# Patient Record
Sex: FEMALE | Race: WHITE | HISPANIC OR LATINO | Employment: UNEMPLOYED | URBAN - NONMETROPOLITAN AREA
[De-identification: names, ages, dates, MRNs, and addresses within clinical notes are randomized per-mention and may not be internally consistent; named-entity substitution may affect disease eponyms.]

---

## 2017-01-16 RX ORDER — ATORVASTATIN CALCIUM 80 MG/1
TABLET, FILM COATED ORAL
Qty: 30 TABLET | Refills: 2 | Status: SHIPPED | OUTPATIENT
Start: 2017-01-16 | End: 2017-06-08 | Stop reason: ALTCHOICE

## 2017-01-16 RX ORDER — CLOPIDOGREL BISULFATE 75 MG/1
TABLET ORAL
Qty: 30 TABLET | Refills: 2 | Status: SHIPPED | OUTPATIENT
Start: 2017-01-16 | End: 2017-05-22 | Stop reason: SDUPTHER

## 2017-01-16 RX ORDER — LISINOPRIL 5 MG/1
TABLET ORAL
Qty: 30 TABLET | Refills: 2 | Status: SHIPPED | OUTPATIENT
Start: 2017-01-16 | End: 2017-06-08

## 2017-01-25 ENCOUNTER — DOCUMENTATION (OUTPATIENT)
Dept: CARDIOLOGY | Facility: CLINIC | Age: 57
End: 2017-01-25

## 2017-01-25 NOTE — PROGRESS NOTES
Our office had received a DME medical necessity clinical documentation from, so i had called patient yest. To find out if she was still wearing her life vest and to see why she had never had her MUGA test done that she had cancelled and no showed for several times. She informed me that she had taken the Lifevest off over a month to month and a half ago due to it being uncomfortable and was tored of wearing it. She stated that every time the muga was scheduled that it had been scheduled at [facility]. I informed the risk involved with her not wearing the leftvest and she was aware of this. Told her i would discuss all above with EDWINA Elias and for her to make sure she keeps her appt. With him in Feb. And she stated she would. EDWINA Elias aware of all above. PH,CLIFFORDN

## 2017-02-06 DIAGNOSIS — I10 ESSENTIAL HYPERTENSION: Primary | ICD-10-CM

## 2017-02-06 RX ORDER — CARVEDILOL 6.25 MG/1
TABLET ORAL
Qty: 60 TABLET | Refills: 5 | Status: SHIPPED | OUTPATIENT
Start: 2017-02-06 | End: 2017-06-08

## 2017-02-23 ENCOUNTER — OFFICE VISIT (OUTPATIENT)
Dept: CARDIOLOGY | Facility: CLINIC | Age: 57
End: 2017-02-23

## 2017-02-23 VITALS
HEART RATE: 89 BPM | HEIGHT: 61 IN | WEIGHT: 174 LBS | DIASTOLIC BLOOD PRESSURE: 71 MMHG | BODY MASS INDEX: 32.85 KG/M2 | OXYGEN SATURATION: 92 % | SYSTOLIC BLOOD PRESSURE: 117 MMHG

## 2017-02-23 DIAGNOSIS — R07.9 CHEST PAIN, UNSPECIFIED TYPE: Primary | ICD-10-CM

## 2017-02-23 DIAGNOSIS — I50.20 SYSTOLIC CONGESTIVE HEART FAILURE, UNSPECIFIED CONGESTIVE HEART FAILURE CHRONICITY: ICD-10-CM

## 2017-02-23 DIAGNOSIS — R06.02 SHORTNESS OF BREATH: ICD-10-CM

## 2017-02-23 DIAGNOSIS — I42.9 CARDIOMYOPATHY (HCC): ICD-10-CM

## 2017-02-23 DIAGNOSIS — I25.10 CORONARY ARTERY DISEASE INVOLVING NATIVE CORONARY ARTERY OF NATIVE HEART WITHOUT ANGINA PECTORIS: ICD-10-CM

## 2017-02-23 PROCEDURE — 99214 OFFICE O/P EST MOD 30 MIN: CPT | Performed by: PHYSICIAN ASSISTANT

## 2017-02-23 RX ORDER — METFORMIN HYDROCHLORIDE 500 MG/1
1000 TABLET, EXTENDED RELEASE ORAL 2 TIMES DAILY
COMMUNITY
Start: 2017-02-20 | End: 2017-02-23 | Stop reason: DRUGHIGH

## 2017-02-23 RX ORDER — GABAPENTIN 800 MG/1
800 TABLET ORAL 4 TIMES DAILY
COMMUNITY
Start: 2017-02-20

## 2017-02-23 RX ORDER — BUDESONIDE AND FORMOTEROL FUMARATE DIHYDRATE 160; 4.5 UG/1; UG/1
AEROSOL RESPIRATORY (INHALATION)
COMMUNITY
Start: 2016-12-29 | End: 2017-08-02

## 2017-02-23 RX ORDER — ALBUTEROL SULFATE 2.5 MG/3ML
2.5 SOLUTION RESPIRATORY (INHALATION) EVERY 4 HOURS PRN
COMMUNITY
End: 2022-02-07

## 2017-02-23 NOTE — PROGRESS NOTES
Problem list     Subjective   Maeve Yates is a 56 y.o. female     Chief Complaint   Patient presents with   • Follow-up     3 mo.       HPI       PROBLEM LIST:  1. Coronary artery disease. Catheterization in 2009 at [facility] demonstrated 30%  circumflex disease with 50% posterolateral branch.   1.1 follow-up catheterization in September 2016 because of non-ST elevation myocardial infarction demonstrated a total occlusion of the right coronary artery with 90% high-grade LAD disease and 50-70% circumflex disease. Stenting of the LAD was performed. Medical management recommended and percutaneous intervention in the circumflex if patient fails medical therapy.  1.2 patient has been on maximum guide line directed therapy, Ranexa as she cannot tolerate isosorbide because of hypotension, with continued chest discomfort  2. Ischemic cardiomyopathy  2.1 anterior infarct and ejection fraction of 30-35%  2.2 patient wearing LifeVest  3. Hypertension.   4. Chronic tobacco habituation.   5. Dyslipidemia.     Patient is a 56-year-old female presents back to office for follow-up. She continues describes discomfort in her chest. She describes a heaviness and pressure sensation that occurs. Usually she will watch her pain but at times like to take nitroglycerin to abort her pain. She has moderate dyspnea at baseline and does complain of occasional PND orthopnea. She palpitate on occasion but no dizziness presyncope or syncope. Otherwise voices no complaints      Outpatient Encounter Prescriptions as of 2/23/2017   Medication Sig Dispense Refill   • albuterol (PROVENTIL HFA;VENTOLIN HFA) 108 (90 BASE) MCG/ACT inhaler Inhale 2 puffs every 4 (four) hours as needed for wheezing or shortness of air.     • albuterol (PROVENTIL) (2.5 MG/3ML) 0.083% nebulizer solution Take 2.5 mg by nebulization Every 4 (Four) Hours As Needed for wheezing.     • aspirin 81 MG EC tablet TAKE ONE TABLET BY MOUTH DAILY 30 tablet 11   • atorvastatin  (LIPITOR) 80 MG tablet TAKE 1 TABLET BY MOUTH DAILY. FOR CHOLESTEROL 30 tablet 2   • carvedilol (COREG) 6.25 MG tablet TAKE TWO TABLETS BY MOUTH 2 TIMES A DAY WITH MEALS FOR HEART AND BLOOD PRESSURE 60 tablet 5   • Cholecalciferol (VITAMIN D3) 35953 UNITS capsule Take  by mouth every 30 (thirty) days.     • clopidogrel (PLAVIX) 75 MG tablet TAKE 1 TABLET BY MOUTH DAILY. 30 tablet 2   • gabapentin (NEURONTIN) 800 MG tablet 3 (Three) Times a Day.     • Insulin Glargine (LANTUS SOLOSTAR) 100 UNIT/ML injection pen Inject 25 Units under the skin Every Night.     • isosorbide mononitrate (IMDUR) 30 MG 24 hr tablet Take 1 tablet by mouth daily. 30 tablet 6   • lisinopril (PRINIVIL,ZESTRIL) 5 MG tablet TAKE 1 TABLET BY MOUTH DAILY. FOR BLOOD PRESSURE 30 tablet 2   • metFORMIN (GLUCOPHAGE) 1000 MG tablet Take 1,000 mg by mouth 2 (two) times a day with meals.     • SYMBICORT 160-4.5 MCG/ACT inhaler prn     • [DISCONTINUED] gabapentin (NEURONTIN) 400 MG capsule Take 800 mg by mouth 3 (three) times a day.     • nitroglycerin (NITROSTAT) 0.4 MG SL tablet Place 0.4 mg under the tongue every 5 (five) minutes as needed for chest pain.     • [DISCONTINUED] amitriptyline (ELAVIL) 50 MG tablet Take  by mouth.     • [DISCONTINUED] glipiZIDE (GLUCOTROL) 10 MG tablet Take  by mouth 2 (two) times a day.     • [DISCONTINUED] metFORMIN ER (GLUCOPHAGE-XR) 500 MG 24 hr tablet 1,000 mg 2 (Two) Times a Day.     • [DISCONTINUED] metoprolol succinate XL (TOPROL-XL) 50 MG 24 hr tablet Take 50 mg by mouth daily.       No facility-administered encounter medications on file as of 2/23/2017.        Review of patient's allergies indicates no known allergies.    Past Medical History   Diagnosis Date   • CAD (coronary artery disease)      Catheterization in 2009 at [facility] demonstrated 30% circumflex disease with 50% posterolateral branch.    • Chest pain    • Diabetes mellitus    • Dyslipidemia    • Fatigue    • Hyperlipidemia    • Hypertension   "  • Shortness of breath    • Tobacco use        Social History     Social History   • Marital status:      Spouse name: N/A   • Number of children: N/A   • Years of education: N/A     Occupational History   • Not on file.     Social History Main Topics   • Smoking status: Former Smoker     Quit date: 9/15/2016   • Smokeless tobacco: Not on file   • Alcohol use No   • Drug use: No   • Sexual activity: Defer     Other Topics Concern   • Not on file     Social History Narrative       Family History   Problem Relation Age of Onset   • Hypertension Mother    • Hyperlipidemia Mother    • Skin cancer Mother        Review of Systems   Constitutional: Positive for fatigue.   HENT: Negative.    Eyes: Positive for visual disturbance (glasses).   Respiratory: Positive for shortness of breath.    Cardiovascular: Positive for chest pain (occas.). Negative for palpitations and leg swelling.   Gastrointestinal: Negative.    Endocrine: Negative.    Genitourinary: Negative.    Musculoskeletal: Negative.    Skin: Negative.    Allergic/Immunologic: Negative.    Neurological: Positive for dizziness and light-headedness.   Hematological: Bruises/bleeds easily (bruise).   Psychiatric/Behavioral: Positive for sleep disturbance.       Objective     Visit Vitals   • /71 (BP Location: Left arm, Patient Position: Sitting)   • Pulse 89   • Ht 61\" (154.9 cm)   • Wt 174 lb (78.9 kg)   • SpO2 92%   • BMI 32.88 kg/m2       Lab Results (most recent)     None          Physical Exam   Constitutional: She is oriented to person, place, and time. She appears well-developed and well-nourished. No distress.   HENT:   Head: Normocephalic and atraumatic.   Eyes: EOM are normal. Pupils are equal, round, and reactive to light.   Neck: No JVD present.   Cardiovascular: Normal rate, regular rhythm and normal heart sounds.  Exam reveals no gallop and no friction rub.    No murmur heard.  Pulmonary/Chest: Effort normal and breath sounds normal. No " respiratory distress. She has no wheezes. She has no rales. She exhibits no tenderness.   Abdominal: Soft. She exhibits no distension. There is no tenderness.   Musculoskeletal: Normal range of motion. She exhibits no edema.   Neurological: She is alert and oriented to person, place, and time. No cranial nerve deficit.   Skin: Skin is warm and dry. No rash noted. No erythema. No pallor.   Psychiatric: She has a normal mood and affect. Her behavior is normal.   Nursing note and vitals reviewed.      Procedure   Procedures       Assessment/Plan     Problems Addressed this Visit        Cardiovascular and Mediastinum    Coronary artery disease involving native coronary artery of native heart without angina pectoris    Relevant Orders    Cardiac catheterization    NUCLEAR MEDICINE CARDIAC BLOOD POOL MUGA REST STRESS    Cardiomyopathy    Relevant Orders    NUCLEAR MEDICINE CARDIAC BLOOD POOL MUGA REST STRESS    Systolic congestive heart failure    Relevant Orders    Cardiac catheterization    NUCLEAR MEDICINE CARDIAC BLOOD POOL MUGA REST STRESS       Respiratory    Shortness of breath    Relevant Orders    Cardiac catheterization    NUCLEAR MEDICINE CARDIAC BLOOD POOL MUGA REST STRESS       Nervous and Auditory    Chest pain - Primary    Relevant Orders    Cardiac catheterization    NUCLEAR MEDICINE CARDIAC BLOOD POOL MUGA REST STRESS              Recommendations  1. Patient has continued chest discomfort despite being on Ranexa. We have tried to manage her symptoms medically but she continues to have breakthrough pain. Last catheterization was recommended that her pain was not controlled to consider intervention in the circumflex. Therefore because of failed Max no gallop directed therapy, continued discomfort and moderate to severe disease in the circumflex, we will set up for catheterization for intervention of the circumflex artery.  2. Also like to obtain a MUGA scan to evaluate systolic function for consideration of  AICD. We will see her back for follow-up of above testing. Follow-up with primary as scheduled  3. We did discuss with her to hold her metformin the day before and they have catheterization

## 2017-03-02 ENCOUNTER — OUTSIDE FACILITY SERVICE (OUTPATIENT)
Dept: CARDIOLOGY | Facility: CLINIC | Age: 57
End: 2017-03-02

## 2017-03-02 PROCEDURE — 93458 L HRT ARTERY/VENTRICLE ANGIO: CPT | Performed by: INTERNAL MEDICINE

## 2017-03-02 PROCEDURE — 92928 PRQ TCAT PLMT NTRAC ST 1 LES: CPT | Performed by: INTERNAL MEDICINE

## 2017-05-22 DIAGNOSIS — I25.84 CORONARY ARTERY DISEASE DUE TO CALCIFIED CORONARY LESION: Primary | ICD-10-CM

## 2017-05-22 DIAGNOSIS — I25.10 CORONARY ARTERY DISEASE DUE TO CALCIFIED CORONARY LESION: Primary | ICD-10-CM

## 2017-05-22 RX ORDER — CLOPIDOGREL BISULFATE 75 MG/1
TABLET ORAL
Qty: 30 TABLET | Refills: 5 | Status: SHIPPED | OUTPATIENT
Start: 2017-05-22 | End: 2018-01-16 | Stop reason: SDUPTHER

## 2017-06-08 ENCOUNTER — OFFICE VISIT (OUTPATIENT)
Dept: CARDIOLOGY | Facility: CLINIC | Age: 57
End: 2017-06-08

## 2017-06-08 VITALS
OXYGEN SATURATION: 96 % | HEART RATE: 87 BPM | BODY MASS INDEX: 32.62 KG/M2 | DIASTOLIC BLOOD PRESSURE: 82 MMHG | WEIGHT: 172.8 LBS | HEIGHT: 61 IN | SYSTOLIC BLOOD PRESSURE: 137 MMHG

## 2017-06-08 DIAGNOSIS — I10 ESSENTIAL HYPERTENSION: ICD-10-CM

## 2017-06-08 DIAGNOSIS — R68.89 ABNORMAL ANKLE BRACHIAL INDEX: ICD-10-CM

## 2017-06-08 DIAGNOSIS — I42.9 CARDIOMYOPATHY (HCC): ICD-10-CM

## 2017-06-08 DIAGNOSIS — I25.10 CORONARY ARTERY DISEASE INVOLVING NATIVE CORONARY ARTERY OF NATIVE HEART WITHOUT ANGINA PECTORIS: ICD-10-CM

## 2017-06-08 DIAGNOSIS — I73.9 PAD (PERIPHERAL ARTERY DISEASE) (HCC): Primary | ICD-10-CM

## 2017-06-08 DIAGNOSIS — I73.9 INTERMITTENT CLAUDICATION (HCC): ICD-10-CM

## 2017-06-08 PROCEDURE — 99214 OFFICE O/P EST MOD 30 MIN: CPT | Performed by: PHYSICIAN ASSISTANT

## 2017-06-08 RX ORDER — FENOFIBRATE 145 MG/1
145 TABLET, COATED ORAL DAILY
Refills: 5 | COMMUNITY
Start: 2017-05-22

## 2017-06-08 RX ORDER — CARVEDILOL 6.25 MG/1
6.25 TABLET ORAL 2 TIMES DAILY WITH MEALS
Qty: 60 TABLET | Refills: 5 | Status: SHIPPED | OUTPATIENT
Start: 2017-06-08 | End: 2018-01-16 | Stop reason: SDUPTHER

## 2017-06-08 RX ORDER — RANOLAZINE 500 MG/1
500 TABLET, EXTENDED RELEASE ORAL 2 TIMES DAILY
Qty: 60 TABLET | Refills: 6 | Status: SHIPPED | OUTPATIENT
Start: 2017-06-08 | End: 2018-02-26 | Stop reason: SDUPTHER

## 2017-06-08 RX ORDER — METFORMIN HYDROCHLORIDE 500 MG/1
TABLET, EXTENDED RELEASE ORAL 2 TIMES DAILY
Refills: 1 | COMMUNITY
Start: 2017-04-24 | End: 2017-06-08

## 2017-06-08 RX ORDER — CILOSTAZOL 50 MG/1
50 TABLET ORAL 2 TIMES DAILY
Qty: 60 TABLET | Refills: 6 | Status: SHIPPED | OUTPATIENT
Start: 2017-06-08 | End: 2017-08-02 | Stop reason: CLARIF

## 2017-06-08 RX ORDER — CITALOPRAM 40 MG/1
40 TABLET ORAL DAILY
Refills: 1 | COMMUNITY
Start: 2017-05-22

## 2017-06-08 RX ORDER — RAMIPRIL 2.5 MG/1
2.5 CAPSULE ORAL DAILY
Qty: 30 CAPSULE | Refills: 6 | Status: ON HOLD | OUTPATIENT
Start: 2017-06-08 | End: 2017-08-03

## 2017-06-08 RX ORDER — INSULIN GLARGINE 100 [IU]/ML
38 INJECTION, SOLUTION SUBCUTANEOUS 2 TIMES DAILY
Refills: 3 | COMMUNITY
Start: 2017-04-21 | End: 2020-01-03

## 2017-06-08 NOTE — PROGRESS NOTES
Problem list     Subjective   Maeve Yates is a 56 y.o. female     Chief Complaint   Patient presents with   • Follow-up     patient appears in office today for follow up with CAMRON results       HPI    PROBLEM LIST:  1. Coronary artery disease. Catheterization in 2009 at [facility] demonstrated 30%  circumflex disease with 50% posterolateral branch.   1.1 follow-up catheterization in September 2016 because of non-ST elevation myocardial infarction demonstrated a total occlusion of the right coronary artery with 90% high-grade LAD disease and 50-70% circumflex disease. Stenting of the LAD was performed. Medical management recommended and percutaneous intervention in the circumflex if patient fails medical therapy.  1.2 follow-up catheterization March 2017 with stenting of the circumflex. Patent stent to the LAD with chronic total occlusion of the right coronary artery. Medical management recommended  2. Ischemic cardiomyopathy  2.1 anterior infarct and ejection fraction of 30-35%  2.2 EF by ventriculography at catheterization was 40%  3. Hypertension.   4. Chronic tobacco habituation.   5. Dyslipidemia.     Patient is a 56-year-old female presents back for follow-up. She has done well since her catheterization. She will he has mild chest discomfort that she notes on occasion. Recently she was noted to have low blood pressures and her isosorbide was discontinued.    Her chest pain is not as severe as what she felt previously stenting. It is only mild at this time. She has mild dyspnea when exerting but nothing has been progressive and denies PND orthopnea. Does not palpitate or have dysrhythmic symptoms.    She does complain of lower extremity discomfort. She describes a history of peripheral arterial disease. Recently, she had an CAMRON which was abnormal and is being referred back to office for evaluation    Current Outpatient Prescriptions   Medication Sig Dispense Refill   • albuterol (PROVENTIL HFA;VENTOLIN HFA)  108 (90 BASE) MCG/ACT inhaler Inhale 2 puffs every 4 (four) hours as needed for wheezing or shortness of air.     • albuterol (PROVENTIL) (2.5 MG/3ML) 0.083% nebulizer solution Take 2.5 mg by nebulization Every 4 (Four) Hours As Needed for wheezing.     • aspirin 81 MG EC tablet TAKE ONE TABLET BY MOUTH DAILY 30 tablet 11   • carvedilol (COREG) 6.25 MG tablet Take 1 tablet by mouth 2 (Two) Times a Day With Meals. 60 tablet 5   • citalopram (CeleXA) 20 MG tablet 20 mg Daily.  1   • clopidogrel (PLAVIX) 75 MG tablet TAKE 1 TABLET BY MOUTH DAILY. 30 tablet 5   • fenofibrate (TRICOR) 145 MG tablet Daily.  5   • gabapentin (NEURONTIN) 800 MG tablet 3 (Three) Times a Day.     • LANTUS 100 UNIT/ML injection 2 (Two) Times a Day. 38 units BID  3   • metFORMIN (GLUCOPHAGE) 1000 MG tablet Take 1,000 mg by mouth 2 (two) times a day with meals.     • nitroglycerin (NITROSTAT) 0.4 MG SL tablet Place 0.4 mg under the tongue every 5 (five) minutes as needed for chest pain.     • SYMBICORT 160-4.5 MCG/ACT inhaler prn     • cilostazol (PLETAL) 50 MG tablet Take 1 tablet by mouth 2 (Two) Times a Day. 60 tablet 6   • ramipril (ALTACE) 2.5 MG capsule Take 1 capsule by mouth Daily. 30 capsule 6   • ranolazine (RANEXA) 500 MG 12 hr tablet Take 1 tablet by mouth 2 (Two) Times a Day. 60 tablet 6     No current facility-administered medications for this visit.        Review of patient's allergies indicates no known allergies.    Past Medical History:   Diagnosis Date   • CAD (coronary artery disease)     Catheterization in 2009 at [facility] demonstrated 30% circumflex disease with 50% posterolateral branch.    • Chest pain    • Diabetes mellitus    • Dyslipidemia    • Fatigue    • Hyperlipidemia    • Hypertension    • Shortness of breath    • Tobacco use        Social History     Social History   • Marital status:      Spouse name: N/A   • Number of children: N/A   • Years of education: N/A     Occupational History   • Not on  "file.     Social History Main Topics   • Smoking status: Current Some Day Smoker     Packs/day: 0.50     Types: Cigarettes     Last attempt to quit: 9/15/2016   • Smokeless tobacco: Never Used   • Alcohol use No   • Drug use: No   • Sexual activity: Defer     Other Topics Concern   • Not on file     Social History Narrative       Family History   Problem Relation Age of Onset   • Hypertension Mother    • Hyperlipidemia Mother    • Skin cancer Mother    • No Known Problems Father        Review of Systems   Constitutional: Negative for fatigue.   HENT: Negative.    Eyes: Positive for visual disturbance (wears reading glasses).   Respiratory: Positive for cough (occasional cough) and wheezing (worse of AM). Negative for chest tightness and shortness of breath.    Cardiovascular: Positive for leg swelling (feet swelling). Negative for chest pain and palpitations.   Gastrointestinal: Negative.  Negative for abdominal pain, nausea and vomiting.   Endocrine: Negative.  Negative for cold intolerance, heat intolerance, polyphagia and polyuria.   Genitourinary: Positive for urgency (occasional urgency). Negative for difficulty urinating and frequency.   Musculoskeletal: Negative.  Negative for arthralgias, back pain, myalgias, neck pain and neck stiffness.   Skin: Negative.  Negative for rash and wound.   Allergic/Immunologic: Negative.  Negative for environmental allergies and food allergies.   Neurological: Negative.  Negative for dizziness, weakness, light-headedness, numbness and headaches.   Hematological: Negative.  Does not bruise/bleed easily.   Psychiatric/Behavioral: Negative for agitation, confusion and sleep disturbance. The patient is not nervous/anxious.        Objective   /82 (BP Location: Left arm, Patient Position: Sitting)  Pulse 87  Ht 61\" (154.9 cm)  Wt 172 lb 12.8 oz (78.4 kg)  SpO2 96%  BMI 32.65 kg/m2  Lab Results (most recent)     None        Physical Exam   Constitutional: She is oriented " to person, place, and time. She appears well-developed and well-nourished. No distress.   HENT:   Head: Normocephalic and atraumatic.   Eyes: EOM are normal. Pupils are equal, round, and reactive to light.   Neck: No JVD present.   Cardiovascular: Normal rate, regular rhythm and normal heart sounds.  Exam reveals no gallop and no friction rub.    No murmur heard.  Pulmonary/Chest: Effort normal and breath sounds normal. No respiratory distress. She has no wheezes. She has no rales. She exhibits no tenderness.   Abdominal: Soft.   Musculoskeletal: Normal range of motion. She exhibits no edema.   Neurological: She is alert and oriented to person, place, and time. No cranial nerve deficit.   Skin: Skin is warm and dry. No rash noted. No erythema. No pallor.   Psychiatric: She has a normal mood and affect. Her behavior is normal.   Nursing note and vitals reviewed.        Procedure   Procedures       Assessment/Plan      Diagnosis Plan   1. PAD (peripheral artery disease)  CT Angio Abdominal Aorta Bilateral Iliofem Runoff With & Without Contrast    CT Angio Abdominal Aorta Bilateral Iliofem Runoff With & Without Contrast   2. Essential hypertension  carvedilol (COREG) 6.25 MG tablet    CT Angio Abdominal Aorta Bilateral Iliofem Runoff With & Without Contrast    CT Angio Abdominal Aorta Bilateral Iliofem Runoff With & Without Contrast   3. Intermittent claudication  CT Angio Abdominal Aorta Bilateral Iliofem Runoff With & Without Contrast    CT Angio Abdominal Aorta Bilateral Iliofem Runoff With & Without Contrast   4. Abnormal ankle brachial index  CT Angio Abdominal Aorta Bilateral Iliofem Runoff With & Without Contrast    CT Angio Abdominal Aorta Bilateral Iliofem Runoff With & Without Contrast   5. Cardiomyopathy     6. Coronary artery disease involving native coronary artery of native heart without angina pectoris           Recommendations  1. Because of abnormal CAMRON, symptoms concerning for intermittent  claudication and history of peripheral arterial disease, we will like to schedule for CT angiogram abdominal aorta runoff.  2. Because of cardiomyopathy, I would like to add a small dose of an ACE inhibitor. I would like to increase Coreg but with recent hypotension, would like to evaluate response to addition of ramipril.  2. I would like to start her on Ranexa for antianginal therapy. We will see her back for follow-up of above testing. Follow-up primary as scheduled.

## 2017-06-19 DIAGNOSIS — E78.5 DYSLIPIDEMIA: Primary | ICD-10-CM

## 2017-06-19 RX ORDER — ATORVASTATIN CALCIUM 80 MG/1
TABLET, FILM COATED ORAL
Qty: 30 TABLET | Refills: 5 | Status: SHIPPED | OUTPATIENT
Start: 2017-06-19 | End: 2020-01-03

## 2017-07-10 ENCOUNTER — TELEPHONE (OUTPATIENT)
Dept: CARDIOLOGY | Facility: CLINIC | Age: 57
End: 2017-07-10

## 2017-07-10 DIAGNOSIS — I70.203 BILATERAL FEMORAL ARTERY STENOSIS (HCC): Primary | ICD-10-CM

## 2017-07-10 NOTE — TELEPHONE ENCOUNTER
----- Message from Alicia Dykes sent at 7/10/2017  3:07 PM EDT -----  Contact: RONNIE (DAUGHTER)  THE PATIENTS DAUGHTER CALLED AND REQUESTED RESULTS OF HER MOTHER'S CT FROM [facility].  SHE CAN BE REACHED [phone].  THANKS     CTA results reviewed per Miguel Lee PA-C. Patient to see Cardiothoracic due to Bilateral Femoral Artery Stenosis. Patient aware. Referral entered and DERIC Bull will schedule patient an appointment.

## 2017-07-17 ENCOUNTER — OFFICE VISIT (OUTPATIENT)
Dept: CARDIAC SURGERY | Facility: CLINIC | Age: 57
End: 2017-07-17

## 2017-07-17 VITALS
WEIGHT: 170 LBS | OXYGEN SATURATION: 93 % | HEART RATE: 86 BPM | TEMPERATURE: 97.8 F | HEIGHT: 62 IN | SYSTOLIC BLOOD PRESSURE: 140 MMHG | DIASTOLIC BLOOD PRESSURE: 84 MMHG | BODY MASS INDEX: 31.28 KG/M2

## 2017-07-17 DIAGNOSIS — I73.9 PAD (PERIPHERAL ARTERY DISEASE) (HCC): Primary | ICD-10-CM

## 2017-07-17 PROCEDURE — 99205 OFFICE O/P NEW HI 60 MIN: CPT | Performed by: THORACIC SURGERY (CARDIOTHORACIC VASCULAR SURGERY)

## 2017-07-17 NOTE — PROGRESS NOTES
07/17/2017  Patient Information  Maeve Page                                                                                          [address] [address]   1960  'PCP/Referring Physician'  Holden Linton MD  [phone]  No ref. provider found    Chief Complaint   Patient presents with   • Consult     Patient complains of constant leg pain left worse than right. Also says her legs get tired easily.   • Peripheral Vascular Disease       History of Present Illness:  This patient was referred to me with peripheral arterial disease.  She complains of both right and left calf claudication but states that the left calf is much worse than the right.  If she walks fast as little as  feet she begins to cramp in the left calf.  If she stops and rests for 2 or 3 minutes the cramping goes away.  If she walks more slowly she can walk further and the calf muscle just becomes very weak.  She has now had a CT angiogram demonstrating bilateral superficial femoral artery disease that appears to be worse in the left leg than the right.  She does have a long history of smoking and is continuing to smoke.  She has seen another vascular surgeon who recommended surgical intervention but apparently there was some disagreement and the patient has been asked to be referred to me.  She is accompanied by a family member at this time.      Patient Active Problem List   Diagnosis   • Chest pain   • Fatigue   • Shortness of breath   • Hypertension   • CAD (coronary artery disease)   • Tobacco use   • Dyslipidemia   • Diabetes mellitus   • Coronary artery disease involving native coronary artery of native heart without angina pectoris   • Cardiomyopathy   • Systolic congestive heart failure   • PAD (peripheral artery disease)   • Intermittent claudication   • Abnormal ankle brachial index     Past Medical History:   Diagnosis Date   • CAD (coronary artery disease)     Catheterization in 2009 at [facility]  demonstrated 30% circumflex disease with 50% posterolateral branch.    • Chest pain    • COPD (chronic obstructive pulmonary disease)    • Depression    • Diabetes mellitus    • Dyslipidemia    • Fatigue    • Hiatal hernia    • Hyperlipidemia    • Hypertension    • Myocardial infarction    • Peripheral vascular disease    • Shortness of breath    • Tobacco use      Past Surgical History:   Procedure Laterality Date   • CARDIAC CATHETERIZATION     • CORONARY ANGIOPLASTY     • CORONARY STENT PLACEMENT  09/15/2016    x1 to Prot Mid LAD  2.25x20   • TUBAL ABDOMINAL LIGATION         Current Outpatient Prescriptions:   •  albuterol (PROVENTIL HFA;VENTOLIN HFA) 108 (90 BASE) MCG/ACT inhaler, Inhale 2 puffs every 4 (four) hours as needed for wheezing or shortness of air., Disp: , Rfl:   •  aspirin 81 MG EC tablet, TAKE ONE TABLET BY MOUTH DAILY, Disp: 30 tablet, Rfl: 11  •  carvedilol (COREG) 6.25 MG tablet, Take 1 tablet by mouth 2 (Two) Times a Day With Meals., Disp: 60 tablet, Rfl: 5  •  cilostazol (PLETAL) 50 MG tablet, Take 1 tablet by mouth 2 (Two) Times a Day., Disp: 60 tablet, Rfl: 6  •  citalopram (CeleXA) 20 MG tablet, 20 mg Daily., Disp: , Rfl: 1  •  fenofibrate (TRICOR) 145 MG tablet, Daily., Disp: , Rfl: 5  •  gabapentin (NEURONTIN) 800 MG tablet, 3 (Three) Times a Day., Disp: , Rfl:   •  LANTUS 100 UNIT/ML injection, 2 (Two) Times a Day. 38 units BID, Disp: , Rfl: 3  •  metFORMIN (GLUCOPHAGE) 1000 MG tablet, Take 1,000 mg by mouth 2 (two) times a day with meals., Disp: , Rfl:   •  ramipril (ALTACE) 2.5 MG capsule, Take 1 capsule by mouth Daily., Disp: 30 capsule, Rfl: 6  •  ranolazine (RANEXA) 500 MG 12 hr tablet, Take 1 tablet by mouth 2 (Two) Times a Day., Disp: 60 tablet, Rfl: 6  •  albuterol (PROVENTIL) (2.5 MG/3ML) 0.083% nebulizer solution, Take 2.5 mg by nebulization Every 4 (Four) Hours As Needed for wheezing., Disp: , Rfl:   •  atorvastatin (LIPITOR) 80 MG tablet, TAKE 1 TABLET BY MOUTH DAILY. FOR  CHOLESTEROL, Disp: 30 tablet, Rfl: 5  •  clopidogrel (PLAVIX) 75 MG tablet, TAKE 1 TABLET BY MOUTH DAILY., Disp: 30 tablet, Rfl: 5  •  nitroglycerin (NITROSTAT) 0.4 MG SL tablet, Place 0.4 mg under the tongue every 5 (five) minutes as needed for chest pain., Disp: , Rfl:   •  SYMBICORT 160-4.5 MCG/ACT inhaler, prn, Disp: , Rfl:   No Known Allergies  Social History     Social History   • Marital status:      Spouse name: N/A   • Number of children: N/A   • Years of education: N/A     Occupational History   • Not on file.     Social History Main Topics   • Smoking status: Current Some Day Smoker     Packs/day: 0.50     Years: 40.00     Types: Cigarettes   • Smokeless tobacco: Never Used      Comment: Has smoked up to 1 1/2 ppd   • Alcohol use No   • Drug use: No   • Sexual activity: Defer     Other Topics Concern   • Not on file     Social History Narrative     Family History   Problem Relation Age of Onset   • Hypertension Mother    • Hyperlipidemia Mother    • Skin cancer Mother    • No Known Problems Father      Review of Systems   Constitution: Positive for diaphoresis, malaise/fatigue and night sweats. Negative for chills, fever and weight loss.   HENT: Negative.  Negative for headaches, hearing loss, odynophagia and sore throat.    Eyes: Negative.    Cardiovascular: Positive for chest pain, claudication and leg swelling (feet). Negative for dyspnea on exertion, orthopnea and palpitations.   Respiratory: Positive for shortness of breath and wheezing. Negative for cough and hemoptysis.    Endocrine: Negative.  Negative for cold intolerance, heat intolerance, polydipsia, polyphagia and polyuria.   Hematologic/Lymphatic: Bruises/bleeds easily.   Skin: Negative.  Negative for itching and rash.   Musculoskeletal: Positive for back pain and muscle cramps. Negative for joint pain, joint swelling and myalgias.   Gastrointestinal: Negative.  Negative for abdominal pain, constipation, diarrhea, hematemesis,  "hematochezia, melena, nausea and vomiting.   Genitourinary: Negative.  Negative for dysuria, frequency and hematuria.   Neurological: Positive for dizziness, light-headedness and loss of balance. Negative for focal weakness, numbness and seizures.   Psychiatric/Behavioral: Positive for depression. Negative for suicidal ideas.   Allergic/Immunologic: Negative.    All other systems reviewed and are negative.    Vitals:    07/17/17 0834   BP: 140/84   BP Location: Right arm   Pulse: 86   Temp: 97.8 °F (36.6 °C)   SpO2: 93%   Weight: 170 lb (77.1 kg)   Height: 62\" (157.5 cm)      Physical Exam   CONSTITUTIONAL: Alert and conversant, Well dressed, Well nourished, No acute distress  EYES: Sclera clean, Anicteric, Pupils equal  ENT: No nasal deviation, Trachea midline  NECK: No neck masses, Supple  LUNGS: No wheezing, Cough, non-congested  HEART: No rubs, No murmurs  ABDOMEN: Soft, non-distended, No masses, Non tender to palpation  NEURO: No motor deficits, No sensory deficits, Cranial Nerves 2 through 12 grossly intact  PSYCHIATRIC: Oriented to person, place and time, No memory deficits, Mood appropriate  VASCULAR:  The femoral pulses are palpable bilaterally.  I am unable to palpate posterior tibial or dorsalis pedis pulses in either lower extremity.  However I can obtain a weak Doppler signal in the posterior tibial vessels bilaterally.    Labs/Imaging:   I have reviewed the outside CT angiogram report and have reviewed the images.  Although the images are not clear, it appears to have superficial femoral artery disease bilaterally, the left side worse than the right.    Assessment/Plan:  Patient with diabetes, long history of tobacco abuse, obesity and ongoing tobacco abuse.  She has significant claudication in the left calf and to a lesser degree on the right.  CT angiogram has confirmed this.  We will plan for an aortogram with runoff with a right femoral artery cannulation to try to evaluate the left leg primarily.  " Patient is aware that this procedure has a risk of nephrotoxicity contrast reaction and bleeding and no guarantees are made as to outcome.  If this can be corrected by catheter-based intervention at that time she would like to have that performed.  We will try to schedule this promptly.  She says for social reasons she needs to wait at least one week.       Patient Active Problem List   Diagnosis   • Chest pain   • Fatigue   • Shortness of breath   • Hypertension   • CAD (coronary artery disease)   • Tobacco use   • Dyslipidemia   • Diabetes mellitus   • Coronary artery disease involving native coronary artery of native heart without angina pectoris   • Cardiomyopathy   • Systolic congestive heart failure   • PAD (peripheral artery disease)   • Intermittent claudication   • Abnormal ankle brachial index     Signed by: Murphy White M.D.    7/17/2017    CC:  MD Miguel Torres MD Debbie Moore, , editing for Murphy White M.D.    I, Murphy White MD, have read and agree with the editing done by Ana Estes, .

## 2017-07-21 ENCOUNTER — PREP FOR SURGERY (OUTPATIENT)
Dept: OTHER | Facility: HOSPITAL | Age: 57
End: 2017-07-21

## 2017-07-21 DIAGNOSIS — I73.9 PAD (PERIPHERAL ARTERY DISEASE) (HCC): Primary | ICD-10-CM

## 2017-08-02 ENCOUNTER — APPOINTMENT (OUTPATIENT)
Dept: PREADMISSION TESTING | Facility: HOSPITAL | Age: 57
End: 2017-08-02

## 2017-08-02 VITALS — HEIGHT: 62 IN | WEIGHT: 174 LBS | BODY MASS INDEX: 32.02 KG/M2

## 2017-08-02 DIAGNOSIS — I73.9 PAD (PERIPHERAL ARTERY DISEASE) (HCC): ICD-10-CM

## 2017-08-02 LAB
ANION GAP SERPL CALCULATED.3IONS-SCNC: 3 MMOL/L (ref 3–11)
BUN BLD-MCNC: 20 MG/DL (ref 9–23)
BUN/CREAT SERPL: 22.2 (ref 7–25)
CALCIUM SPEC-SCNC: 9.4 MG/DL (ref 8.7–10.4)
CHLORIDE SERPL-SCNC: 107 MMOL/L (ref 99–109)
CO2 SERPL-SCNC: 29 MMOL/L (ref 20–31)
CREAT BLD-MCNC: 0.9 MG/DL (ref 0.6–1.3)
DEPRECATED RDW RBC AUTO: 51.7 FL (ref 37–54)
ERYTHROCYTE [DISTWIDTH] IN BLOOD BY AUTOMATED COUNT: 15.3 % (ref 11.3–14.5)
GFR SERPL CREATININE-BSD FRML MDRD: 65 ML/MIN/1.73
GLUCOSE BLD-MCNC: 200 MG/DL (ref 70–100)
HBA1C MFR BLD: 9.1 % (ref 4.8–5.6)
HCT VFR BLD AUTO: 43 % (ref 34.5–44)
HGB BLD-MCNC: 14.1 G/DL (ref 11.5–15.5)
INR PPP: 1.04
MCH RBC QN AUTO: 30.1 PG (ref 27–31)
MCHC RBC AUTO-ENTMCNC: 32.8 G/DL (ref 32–36)
MCV RBC AUTO: 91.7 FL (ref 80–99)
PLATELET # BLD AUTO: 187 10*3/MM3 (ref 150–450)
PMV BLD AUTO: 12.4 FL (ref 6–12)
POTASSIUM BLD-SCNC: 4.2 MMOL/L (ref 3.5–5.5)
PROTHROMBIN TIME: 11.4 SECONDS (ref 9.6–11.5)
RBC # BLD AUTO: 4.69 10*6/MM3 (ref 3.89–5.14)
SODIUM BLD-SCNC: 139 MMOL/L (ref 132–146)
WBC NRBC COR # BLD: 5.61 10*3/MM3 (ref 3.5–10.8)

## 2017-08-02 PROCEDURE — 83036 HEMOGLOBIN GLYCOSYLATED A1C: CPT | Performed by: ANESTHESIOLOGY

## 2017-08-02 PROCEDURE — 85027 COMPLETE CBC AUTOMATED: CPT | Performed by: ANESTHESIOLOGY

## 2017-08-02 PROCEDURE — 85610 PROTHROMBIN TIME: CPT | Performed by: PHYSICIAN ASSISTANT

## 2017-08-02 PROCEDURE — 80048 BASIC METABOLIC PNL TOTAL CA: CPT | Performed by: PHYSICIAN ASSISTANT

## 2017-08-02 PROCEDURE — 36415 COLL VENOUS BLD VENIPUNCTURE: CPT

## 2017-08-03 ENCOUNTER — ANESTHESIA EVENT (OUTPATIENT)
Dept: PERIOP | Facility: HOSPITAL | Age: 57
End: 2017-08-03

## 2017-08-03 ENCOUNTER — ANESTHESIA (OUTPATIENT)
Dept: PERIOP | Facility: HOSPITAL | Age: 57
End: 2017-08-03

## 2017-08-03 ENCOUNTER — HOSPITAL ENCOUNTER (OUTPATIENT)
Facility: HOSPITAL | Age: 57
Discharge: HOME | End: 2017-08-04
Attending: THORACIC SURGERY (CARDIOTHORACIC VASCULAR SURGERY) | Admitting: THORACIC SURGERY (CARDIOTHORACIC VASCULAR SURGERY)

## 2017-08-03 ENCOUNTER — APPOINTMENT (OUTPATIENT)
Dept: GENERAL RADIOLOGY | Facility: HOSPITAL | Age: 57
End: 2017-08-03

## 2017-08-03 DIAGNOSIS — I73.9 PAD (PERIPHERAL ARTERY DISEASE) (HCC): ICD-10-CM

## 2017-08-03 LAB
ACT BLD: 114 SECONDS (ref 82–152)
GLUCOSE BLDC GLUCOMTR-MCNC: 210 MG/DL (ref 70–130)

## 2017-08-03 PROCEDURE — 75625 CONTRAST EXAM ABDOMINL AORTA: CPT | Performed by: THORACIC SURGERY (CARDIOTHORACIC VASCULAR SURGERY)

## 2017-08-03 PROCEDURE — 0 IOPAMIDOL 61 % SOLUTION: Performed by: THORACIC SURGERY (CARDIOTHORACIC VASCULAR SURGERY)

## 2017-08-03 PROCEDURE — C1894 INTRO/SHEATH, NON-LASER: HCPCS | Performed by: THORACIC SURGERY (CARDIOTHORACIC VASCULAR SURGERY)

## 2017-08-03 PROCEDURE — C1725 CATH, TRANSLUMIN NON-LASER: HCPCS | Performed by: THORACIC SURGERY (CARDIOTHORACIC VASCULAR SURGERY)

## 2017-08-03 PROCEDURE — G0378 HOSPITAL OBSERVATION PER HR: HCPCS

## 2017-08-03 PROCEDURE — 75716 ARTERY X-RAYS ARMS/LEGS: CPT | Performed by: THORACIC SURGERY (CARDIOTHORACIC VASCULAR SURGERY)

## 2017-08-03 PROCEDURE — C1769 GUIDE WIRE: HCPCS | Performed by: THORACIC SURGERY (CARDIOTHORACIC VASCULAR SURGERY)

## 2017-08-03 PROCEDURE — C1876 STENT, NON-COA/NON-COV W/DEL: HCPCS | Performed by: THORACIC SURGERY (CARDIOTHORACIC VASCULAR SURGERY)

## 2017-08-03 PROCEDURE — 37226 PR REVSC OPN/PRQ FEM/POP W/STNT/ANGIOP SM VSL: CPT | Performed by: THORACIC SURGERY (CARDIOTHORACIC VASCULAR SURGERY)

## 2017-08-03 PROCEDURE — 25010000002 HEPARIN (PORCINE) PER 1000 UNITS: Performed by: THORACIC SURGERY (CARDIOTHORACIC VASCULAR SURGERY)

## 2017-08-03 PROCEDURE — 82962 GLUCOSE BLOOD TEST: CPT

## 2017-08-03 PROCEDURE — 85347 COAGULATION TIME ACTIVATED: CPT

## 2017-08-03 PROCEDURE — 75625 CONTRAST EXAM ABDOMINL AORTA: CPT

## 2017-08-03 PROCEDURE — 75716 ARTERY X-RAYS ARMS/LEGS: CPT

## 2017-08-03 PROCEDURE — 25010000002 PROPOFOL 1000 MG/ML EMULSION: Performed by: NURSE ANESTHETIST, CERTIFIED REGISTERED

## 2017-08-03 PROCEDURE — C1887 CATHETER, GUIDING: HCPCS | Performed by: THORACIC SURGERY (CARDIOTHORACIC VASCULAR SURGERY)

## 2017-08-03 PROCEDURE — 0 IODIXANOL PER 1 ML: Performed by: THORACIC SURGERY (CARDIOTHORACIC VASCULAR SURGERY)

## 2017-08-03 PROCEDURE — 25010000002 FENTANYL CITRATE (PF) 100 MCG/2ML SOLUTION: Performed by: NURSE ANESTHETIST, CERTIFIED REGISTERED

## 2017-08-03 PROCEDURE — 25010000002 PHENYLEPHRINE PER 1 ML: Performed by: NURSE ANESTHETIST, CERTIFIED REGISTERED

## 2017-08-03 DEVICE — STNT PROTEGE EVERFLX SEXP.035 6X40 120: Type: IMPLANTABLE DEVICE | Status: FUNCTIONAL

## 2017-08-03 RX ORDER — ASPIRIN 81 MG/1
81 TABLET ORAL DAILY
Status: DISCONTINUED | OUTPATIENT
Start: 2017-08-03 | End: 2017-08-04 | Stop reason: HOSPADM

## 2017-08-03 RX ORDER — CARVEDILOL 6.25 MG/1
6.25 TABLET ORAL 2 TIMES DAILY WITH MEALS
Status: DISCONTINUED | OUTPATIENT
Start: 2017-08-03 | End: 2017-08-04 | Stop reason: HOSPADM

## 2017-08-03 RX ORDER — HYDROMORPHONE HYDROCHLORIDE 1 MG/ML
0.5 INJECTION, SOLUTION INTRAMUSCULAR; INTRAVENOUS; SUBCUTANEOUS
Status: DISCONTINUED | OUTPATIENT
Start: 2017-08-03 | End: 2017-08-03 | Stop reason: HOSPADM

## 2017-08-03 RX ORDER — PROMETHAZINE HYDROCHLORIDE 25 MG/1
25 TABLET ORAL ONCE AS NEEDED
Status: DISCONTINUED | OUTPATIENT
Start: 2017-08-03 | End: 2017-08-03 | Stop reason: HOSPADM

## 2017-08-03 RX ORDER — PROMETHAZINE HYDROCHLORIDE 25 MG/ML
6.25 INJECTION, SOLUTION INTRAMUSCULAR; INTRAVENOUS ONCE AS NEEDED
Status: DISCONTINUED | OUTPATIENT
Start: 2017-08-03 | End: 2017-08-03 | Stop reason: HOSPADM

## 2017-08-03 RX ORDER — ATORVASTATIN CALCIUM 40 MG/1
80 TABLET, FILM COATED ORAL DAILY
Status: DISCONTINUED | OUTPATIENT
Start: 2017-08-03 | End: 2017-08-04 | Stop reason: HOSPADM

## 2017-08-03 RX ORDER — LIDOCAINE HYDROCHLORIDE 10 MG/ML
0.5 INJECTION, SOLUTION EPIDURAL; INFILTRATION; INTRACAUDAL; PERINEURAL ONCE AS NEEDED
Status: DISCONTINUED | OUTPATIENT
Start: 2017-08-03 | End: 2017-08-03 | Stop reason: HOSPADM

## 2017-08-03 RX ORDER — ONDANSETRON 2 MG/ML
4 INJECTION INTRAMUSCULAR; INTRAVENOUS ONCE AS NEEDED
Status: DISCONTINUED | OUTPATIENT
Start: 2017-08-03 | End: 2017-08-03 | Stop reason: HOSPADM

## 2017-08-03 RX ORDER — MORPHINE SULFATE 2 MG/ML
2 INJECTION, SOLUTION INTRAMUSCULAR; INTRAVENOUS
Status: DISCONTINUED | OUTPATIENT
Start: 2017-08-03 | End: 2017-08-04 | Stop reason: HOSPADM

## 2017-08-03 RX ORDER — IPRATROPIUM BROMIDE AND ALBUTEROL SULFATE 2.5; .5 MG/3ML; MG/3ML
3 SOLUTION RESPIRATORY (INHALATION) ONCE AS NEEDED
Status: DISCONTINUED | OUTPATIENT
Start: 2017-08-03 | End: 2017-08-03 | Stop reason: HOSPADM

## 2017-08-03 RX ORDER — PROMETHAZINE HYDROCHLORIDE 25 MG/1
25 SUPPOSITORY RECTAL ONCE AS NEEDED
Status: DISCONTINUED | OUTPATIENT
Start: 2017-08-03 | End: 2017-08-03 | Stop reason: HOSPADM

## 2017-08-03 RX ORDER — FAMOTIDINE 10 MG/ML
20 INJECTION, SOLUTION INTRAVENOUS ONCE
Status: DISCONTINUED | OUTPATIENT
Start: 2017-08-03 | End: 2017-08-03 | Stop reason: HOSPADM

## 2017-08-03 RX ORDER — IODIXANOL 320 MG/ML
INJECTION, SOLUTION INTRAVASCULAR AS NEEDED
Status: DISCONTINUED | OUTPATIENT
Start: 2017-08-03 | End: 2017-08-03 | Stop reason: HOSPADM

## 2017-08-03 RX ORDER — FENTANYL CITRATE 50 UG/ML
INJECTION, SOLUTION INTRAMUSCULAR; INTRAVENOUS AS NEEDED
Status: DISCONTINUED | OUTPATIENT
Start: 2017-08-03 | End: 2017-08-03 | Stop reason: SURG

## 2017-08-03 RX ORDER — HYDROCODONE BITARTRATE AND ACETAMINOPHEN 7.5; 325 MG/1; MG/1
1 TABLET ORAL EVERY 4 HOURS PRN
Status: DISCONTINUED | OUTPATIENT
Start: 2017-08-03 | End: 2017-08-04 | Stop reason: HOSPADM

## 2017-08-03 RX ORDER — FAMOTIDINE 20 MG/1
20 TABLET, FILM COATED ORAL ONCE
Status: DISCONTINUED | OUTPATIENT
Start: 2017-08-03 | End: 2017-08-03 | Stop reason: HOSPADM

## 2017-08-03 RX ORDER — FENOFIBRATE 145 MG/1
145 TABLET, COATED ORAL DAILY
Status: DISCONTINUED | OUTPATIENT
Start: 2017-08-03 | End: 2017-08-04 | Stop reason: HOSPADM

## 2017-08-03 RX ORDER — CLOPIDOGREL BISULFATE 75 MG/1
75 TABLET ORAL ONCE
Status: DISCONTINUED | OUTPATIENT
Start: 2017-08-04 | End: 2017-08-04

## 2017-08-03 RX ORDER — CITALOPRAM 20 MG/1
20 TABLET ORAL DAILY
Status: DISCONTINUED | OUTPATIENT
Start: 2017-08-03 | End: 2017-08-04 | Stop reason: HOSPADM

## 2017-08-03 RX ORDER — SODIUM CHLORIDE 450 MG/100ML
100 INJECTION, SOLUTION INTRAVENOUS CONTINUOUS
Status: DISCONTINUED | OUTPATIENT
Start: 2017-08-03 | End: 2017-08-04 | Stop reason: HOSPADM

## 2017-08-03 RX ORDER — FENTANYL CITRATE 50 UG/ML
50 INJECTION, SOLUTION INTRAMUSCULAR; INTRAVENOUS
Status: DISCONTINUED | OUTPATIENT
Start: 2017-08-03 | End: 2017-08-03

## 2017-08-03 RX ORDER — LIDOCAINE HYDROCHLORIDE 10 MG/ML
INJECTION, SOLUTION INFILTRATION; PERINEURAL AS NEEDED
Status: DISCONTINUED | OUTPATIENT
Start: 2017-08-03 | End: 2017-08-03 | Stop reason: HOSPADM

## 2017-08-03 RX ORDER — SODIUM CHLORIDE 0.9 % (FLUSH) 0.9 %
1-10 SYRINGE (ML) INJECTION AS NEEDED
Status: DISCONTINUED | OUTPATIENT
Start: 2017-08-03 | End: 2017-08-03 | Stop reason: HOSPADM

## 2017-08-03 RX ORDER — NALOXONE HCL 0.4 MG/ML
0.4 VIAL (ML) INJECTION AS NEEDED
Status: DISCONTINUED | OUTPATIENT
Start: 2017-08-03 | End: 2017-08-03 | Stop reason: HOSPADM

## 2017-08-03 RX ORDER — HYDRALAZINE HYDROCHLORIDE 20 MG/ML
5 INJECTION INTRAMUSCULAR; INTRAVENOUS
Status: DISCONTINUED | OUTPATIENT
Start: 2017-08-03 | End: 2017-08-03 | Stop reason: HOSPADM

## 2017-08-03 RX ORDER — OXYCODONE AND ACETAMINOPHEN 7.5; 325 MG/1; MG/1
1 TABLET ORAL ONCE AS NEEDED
Status: DISCONTINUED | OUTPATIENT
Start: 2017-08-03 | End: 2017-08-03

## 2017-08-03 RX ORDER — RANOLAZINE 500 MG/1
500 TABLET, EXTENDED RELEASE ORAL 2 TIMES DAILY
Status: DISCONTINUED | OUTPATIENT
Start: 2017-08-03 | End: 2017-08-04 | Stop reason: HOSPADM

## 2017-08-03 RX ORDER — LABETALOL HYDROCHLORIDE 5 MG/ML
5 INJECTION, SOLUTION INTRAVENOUS
Status: DISCONTINUED | OUTPATIENT
Start: 2017-08-03 | End: 2017-08-03 | Stop reason: HOSPADM

## 2017-08-03 RX ORDER — SODIUM CHLORIDE, SODIUM LACTATE, POTASSIUM CHLORIDE, CALCIUM CHLORIDE 600; 310; 30; 20 MG/100ML; MG/100ML; MG/100ML; MG/100ML
9 INJECTION, SOLUTION INTRAVENOUS CONTINUOUS
Status: DISCONTINUED | OUTPATIENT
Start: 2017-08-03 | End: 2017-08-04 | Stop reason: HOSPADM

## 2017-08-03 RX ORDER — NITROGLYCERIN 0.4 MG/1
0.4 TABLET SUBLINGUAL
Status: DISCONTINUED | OUTPATIENT
Start: 2017-08-03 | End: 2017-08-04 | Stop reason: HOSPADM

## 2017-08-03 RX ORDER — OXYCODONE HYDROCHLORIDE AND ACETAMINOPHEN 5; 325 MG/1; MG/1
1 TABLET ORAL ONCE AS NEEDED
Status: DISCONTINUED | OUTPATIENT
Start: 2017-08-03 | End: 2017-08-03

## 2017-08-03 RX ADMIN — PHENYLEPHRINE HYDROCHLORIDE 200 MCG: 10 INJECTION INTRAVENOUS at 10:49

## 2017-08-03 RX ADMIN — PHENYLEPHRINE HYDROCHLORIDE 100 MCG: 10 INJECTION INTRAVENOUS at 10:47

## 2017-08-03 RX ADMIN — FENTANYL CITRATE 50 MCG: 50 INJECTION, SOLUTION INTRAMUSCULAR; INTRAVENOUS at 10:40

## 2017-08-03 RX ADMIN — CARVEDILOL 6.25 MG: 6.25 TABLET, FILM COATED ORAL at 20:51

## 2017-08-03 RX ADMIN — PROPOFOL 100 MCG/KG/MIN: 10 INJECTION, EMULSION INTRAVENOUS at 10:40

## 2017-08-03 RX ADMIN — FAMOTIDINE 20 MG: 20 TABLET, FILM COATED ORAL at 09:00

## 2017-08-03 RX ADMIN — HYDROCODONE BITARTRATE AND ACETAMINOPHEN 1 TABLET: 7.5; 325 TABLET ORAL at 21:04

## 2017-08-03 RX ADMIN — FENTANYL CITRATE 50 MCG: 50 INJECTION, SOLUTION INTRAMUSCULAR; INTRAVENOUS at 10:36

## 2017-08-03 RX ADMIN — RANOLAZINE 500 MG: 500 TABLET, FILM COATED, EXTENDED RELEASE ORAL at 20:51

## 2017-08-03 RX ADMIN — SODIUM CHLORIDE, POTASSIUM CHLORIDE, SODIUM LACTATE AND CALCIUM CHLORIDE: 600; 310; 30; 20 INJECTION, SOLUTION INTRAVENOUS at 10:34

## 2017-08-03 RX ADMIN — PHENYLEPHRINE HYDROCHLORIDE 200 MCG: 10 INJECTION INTRAVENOUS at 11:12

## 2017-08-03 NOTE — OP NOTE
Operative Report    Preop Diagnosis: Ongoing tobacco abuse.  Left leg claudication.  Reveals coronary artery stents        Procedure: Right femoral artery catheter.  Catheter in the aorta.  Aortogram with runoff.  Third order catheter and arteriogram of the left superficial femoral artery.  Angioplasty and stent of the left superficial femoral artery with a 6 mm x 40 mm Ev3 self-expanding stent.  Completion arteriogram of the left superficial femoral        Surgeons: Murphy White M.D. and Randy BLAND        Indication: Left leg claudication and abnormal ankle-brachial indices understood the nature procedure.  Risk of bleeding infection contrast reaction nephrotoxicity and agreed to proceed he is remain as to outcome        Description: Supine position.  Sterile prep and drape.  10 mL 1% lidocaine anesthesia infiltrated around the right common femoral artery.  Right common femoral artery his percutaneous cannulated without difficulty and under fluoroscopic visualization and 035 guide was placed in the suprarenal position.  This is followed by 4 Azeri sheath and a pigtail catheter.  Aortogram demonstrated the renal arteries were single and patent bilaterally the aorta itself had no specific stenosis the common iliac arteries and external iliac arteries were widely patent bilaterally without stenosis.  The catheter was then repositioned below the renal arteries and a completion runoff was performed the common femorals and profundus femorals were widely patent on the right leg superficial femoral artery was small but had no hemodynamically significant stenosis similarly the right popliteal and all trifurcation vessels were widely patent to the level of the ankle and foot.  On the left leg however in the distal one third of the superficial femoral artery was a 95% fairly focal stenosis.  The artery was clearly diseased for approximately 1 cm above and below the stenosis.  Distal to this the left popliteal artery  and trifurcation vessels were normal in appearance and takeoff.  Then using a curved catheter I went from the right femoral artery over to the left iliac system with the selective guidewire selectively engaged left superficial femoral artery then using 6 Vietnamese destination catheter placed this in the orifice of the left superficial femoral artery and a selective arteriogram run demonstrated area of stenosis this was corrected by placing a 6 x 40 mm self-expanding stent in this area and then using an angioplasty balloon to inflate this to 6 mm at 12 angelito.  Completion selective arteriogram run of this left superficial femoral artery demonstrated resolution of the stenosis with no evidence of extravasation.  Total contrast given 130 mL.  Total fluoroscopy time 4 minutes.      Please note that portions of this note were completed with a voice recognition program. Efforts were made to edit the dictations, but occasionally words are mistranscribed.

## 2017-08-03 NOTE — PLAN OF CARE
Problem: Patient Care Overview (Adult)  Goal: Plan of Care Review  Outcome: Ongoing (interventions implemented as appropriate)    08/03/17 3644   Coping/Psychosocial Response Interventions   Plan Of Care Reviewed With patient;family   Patient Care Overview   Progress improving   Outcome Evaluation   Outcome Summary/Follow up Plan Patient received 1 stent to L superficial Fem artery. No complaints of pain, patient currently on 4L NC        Goal: Adult Individualization and Mutuality  Outcome: Ongoing (interventions implemented as appropriate)  Goal: Discharge Needs Assessment  Outcome: Ongoing (interventions implemented as appropriate)    Problem: Perioperative Period (Adult)  Goal: Signs and Symptoms of Listed Potential Problems Will be Absent or Manageable (Perioperative Period)  Outcome: Ongoing (interventions implemented as appropriate)

## 2017-08-03 NOTE — ANESTHESIA POSTPROCEDURE EVALUATION
Patient: Maeve Yates    Procedure Summary     Date Anesthesia Start Anesthesia Stop Room / Location    08/03/17 1034 1122 [facility] OR 15 / [facility] 15       Procedure Diagnosis Provider Provider    AORTAGRAM WITH RUNOFFS and STENT (N/A Abdomen) PAD (peripheral artery disease)  (PAD (peripheral artery disease) [I73.9]) MD Chip Keys MD          Anesthesia Type: general  Last vitals  BP   106/57 (08/03/17 1120)    Temp   97 °F (36.1 °C) (08/03/17 1120)    Pulse   82 (08/03/17 1120)   Resp   16 (08/03/17 1120)    SpO2   93 % (08/03/17 1120)      Post Anesthesia Care and Evaluation    Patient location during evaluation: PACU  Patient participation: complete - patient participated  Level of consciousness: awake and alert  Pain score: 0  Pain management: adequate  Airway patency: patent  Anesthetic complications: No anesthetic complications  PONV Status: none  Cardiovascular status: hemodynamically stable and acceptable  Respiratory status: nonlabored ventilation, acceptable and nasal cannula  Hydration status: acceptable

## 2017-08-03 NOTE — PLAN OF CARE
Problem: Perioperative Period (Adult)  Goal: Signs and Symptoms of Listed Potential Problems Will be Absent or Manageable (Perioperative Period)  Outcome: Ongoing (interventions implemented as appropriate)    08/03/17 0901   Perioperative Period   Problems Assessed (Perioperative Period) pain   Problems Present (Perioperative Period) none

## 2017-08-03 NOTE — ANESTHESIA PREPROCEDURE EVALUATION
Anesthesia Evaluation     Patient summary reviewed and Nursing notes reviewed   NPO Solid Status: > 8 hours  NPO Liquid Status: > 8 hours     Airway   Mallampati: II  TM distance: >3 FB  Neck ROM: full  no difficulty expected  Dental    (+) edentulous    Pulmonary - normal exam   (+) COPD, shortness of breath,   Cardiovascular - normal exam    (+) hypertension, past MI , CAD, cardiac stents CHF, PVD, hyperlipidemia      Neuro/Psych  (+) psychiatric history,    GI/Hepatic/Renal/Endo    (+)  diabetes mellitus,     Musculoskeletal     Abdominal    Substance History      OB/GYN          Other                                        Anesthesia Plan    ASA 3     general     intravenous induction   Anesthetic plan and risks discussed with patient.    Plan discussed with CRNA.

## 2017-08-03 NOTE — H&P (VIEW-ONLY)
07/17/2017  Patient Information  Maeve Page                                                                                          [address] [address]   1960  'PCP/Referring Physician'  Holden Linton MD  [phone]  No ref. provider found    Chief Complaint   Patient presents with   • Consult     Patient complains of constant leg pain left worse than right. Also says her legs get tired easily.   • Peripheral Vascular Disease       History of Present Illness:  This patient was referred to me with peripheral arterial disease.  She complains of both right and left calf claudication but states that the left calf is much worse than the right.  If she walks fast as little as  feet she begins to cramp in the left calf.  If she stops and rests for 2 or 3 minutes the cramping goes away.  If she walks more slowly she can walk further and the calf muscle just becomes very weak.  She has now had a CT angiogram demonstrating bilateral superficial femoral artery disease that appears to be worse in the left leg than the right.  She does have a long history of smoking and is continuing to smoke.  She has seen another vascular surgeon who recommended surgical intervention but apparently there was some disagreement and the patient has been asked to be referred to me.  She is accompanied by a family member at this time.      Patient Active Problem List   Diagnosis   • Chest pain   • Fatigue   • Shortness of breath   • Hypertension   • CAD (coronary artery disease)   • Tobacco use   • Dyslipidemia   • Diabetes mellitus   • Coronary artery disease involving native coronary artery of native heart without angina pectoris   • Cardiomyopathy   • Systolic congestive heart failure   • PAD (peripheral artery disease)   • Intermittent claudication   • Abnormal ankle brachial index     Past Medical History:   Diagnosis Date   • CAD (coronary artery disease)     Catheterization in 2009 at [facility]  demonstrated 30% circumflex disease with 50% posterolateral branch.    • Chest pain    • COPD (chronic obstructive pulmonary disease)    • Depression    • Diabetes mellitus    • Dyslipidemia    • Fatigue    • Hiatal hernia    • Hyperlipidemia    • Hypertension    • Myocardial infarction    • Peripheral vascular disease    • Shortness of breath    • Tobacco use      Past Surgical History:   Procedure Laterality Date   • CARDIAC CATHETERIZATION     • CORONARY ANGIOPLASTY     • CORONARY STENT PLACEMENT  09/15/2016    x1 to Prot Mid LAD  2.25x20   • TUBAL ABDOMINAL LIGATION         Current Outpatient Prescriptions:   •  albuterol (PROVENTIL HFA;VENTOLIN HFA) 108 (90 BASE) MCG/ACT inhaler, Inhale 2 puffs every 4 (four) hours as needed for wheezing or shortness of air., Disp: , Rfl:   •  aspirin 81 MG EC tablet, TAKE ONE TABLET BY MOUTH DAILY, Disp: 30 tablet, Rfl: 11  •  carvedilol (COREG) 6.25 MG tablet, Take 1 tablet by mouth 2 (Two) Times a Day With Meals., Disp: 60 tablet, Rfl: 5  •  cilostazol (PLETAL) 50 MG tablet, Take 1 tablet by mouth 2 (Two) Times a Day., Disp: 60 tablet, Rfl: 6  •  citalopram (CeleXA) 20 MG tablet, 20 mg Daily., Disp: , Rfl: 1  •  fenofibrate (TRICOR) 145 MG tablet, Daily., Disp: , Rfl: 5  •  gabapentin (NEURONTIN) 800 MG tablet, 3 (Three) Times a Day., Disp: , Rfl:   •  LANTUS 100 UNIT/ML injection, 2 (Two) Times a Day. 38 units BID, Disp: , Rfl: 3  •  metFORMIN (GLUCOPHAGE) 1000 MG tablet, Take 1,000 mg by mouth 2 (two) times a day with meals., Disp: , Rfl:   •  ramipril (ALTACE) 2.5 MG capsule, Take 1 capsule by mouth Daily., Disp: 30 capsule, Rfl: 6  •  ranolazine (RANEXA) 500 MG 12 hr tablet, Take 1 tablet by mouth 2 (Two) Times a Day., Disp: 60 tablet, Rfl: 6  •  albuterol (PROVENTIL) (2.5 MG/3ML) 0.083% nebulizer solution, Take 2.5 mg by nebulization Every 4 (Four) Hours As Needed for wheezing., Disp: , Rfl:   •  atorvastatin (LIPITOR) 80 MG tablet, TAKE 1 TABLET BY MOUTH DAILY. FOR  CHOLESTEROL, Disp: 30 tablet, Rfl: 5  •  clopidogrel (PLAVIX) 75 MG tablet, TAKE 1 TABLET BY MOUTH DAILY., Disp: 30 tablet, Rfl: 5  •  nitroglycerin (NITROSTAT) 0.4 MG SL tablet, Place 0.4 mg under the tongue every 5 (five) minutes as needed for chest pain., Disp: , Rfl:   •  SYMBICORT 160-4.5 MCG/ACT inhaler, prn, Disp: , Rfl:   No Known Allergies  Social History     Social History   • Marital status:      Spouse name: N/A   • Number of children: N/A   • Years of education: N/A     Occupational History   • Not on file.     Social History Main Topics   • Smoking status: Current Some Day Smoker     Packs/day: 0.50     Years: 40.00     Types: Cigarettes   • Smokeless tobacco: Never Used      Comment: Has smoked up to 1 1/2 ppd   • Alcohol use No   • Drug use: No   • Sexual activity: Defer     Other Topics Concern   • Not on file     Social History Narrative     Family History   Problem Relation Age of Onset   • Hypertension Mother    • Hyperlipidemia Mother    • Skin cancer Mother    • No Known Problems Father      Review of Systems   Constitution: Positive for diaphoresis, malaise/fatigue and night sweats. Negative for chills, fever and weight loss.   HENT: Negative.  Negative for headaches, hearing loss, odynophagia and sore throat.    Eyes: Negative.    Cardiovascular: Positive for chest pain, claudication and leg swelling (feet). Negative for dyspnea on exertion, orthopnea and palpitations.   Respiratory: Positive for shortness of breath and wheezing. Negative for cough and hemoptysis.    Endocrine: Negative.  Negative for cold intolerance, heat intolerance, polydipsia, polyphagia and polyuria.   Hematologic/Lymphatic: Bruises/bleeds easily.   Skin: Negative.  Negative for itching and rash.   Musculoskeletal: Positive for back pain and muscle cramps. Negative for joint pain, joint swelling and myalgias.   Gastrointestinal: Negative.  Negative for abdominal pain, constipation, diarrhea, hematemesis,  "hematochezia, melena, nausea and vomiting.   Genitourinary: Negative.  Negative for dysuria, frequency and hematuria.   Neurological: Positive for dizziness, light-headedness and loss of balance. Negative for focal weakness, numbness and seizures.   Psychiatric/Behavioral: Positive for depression. Negative for suicidal ideas.   Allergic/Immunologic: Negative.    All other systems reviewed and are negative.    Vitals:    07/17/17 0834   BP: 140/84   BP Location: Right arm   Pulse: 86   Temp: 97.8 °F (36.6 °C)   SpO2: 93%   Weight: 170 lb (77.1 kg)   Height: 62\" (157.5 cm)      Physical Exam   CONSTITUTIONAL: Alert and conversant, Well dressed, Well nourished, No acute distress  EYES: Sclera clean, Anicteric, Pupils equal  ENT: No nasal deviation, Trachea midline  NECK: No neck masses, Supple  LUNGS: No wheezing, Cough, non-congested  HEART: No rubs, No murmurs  ABDOMEN: Soft, non-distended, No masses, Non tender to palpation  NEURO: No motor deficits, No sensory deficits, Cranial Nerves 2 through 12 grossly intact  PSYCHIATRIC: Oriented to person, place and time, No memory deficits, Mood appropriate  VASCULAR:  The femoral pulses are palpable bilaterally.  I am unable to palpate posterior tibial or dorsalis pedis pulses in either lower extremity.  However I can obtain a weak Doppler signal in the posterior tibial vessels bilaterally.    Labs/Imaging:   I have reviewed the outside CT angiogram report and have reviewed the images.  Although the images are not clear, it appears to have superficial femoral artery disease bilaterally, the left side worse than the right.    Assessment/Plan:  Patient with diabetes, long history of tobacco abuse, obesity and ongoing tobacco abuse.  She has significant claudication in the left calf and to a lesser degree on the right.  CT angiogram has confirmed this.  We will plan for an aortogram with runoff with a right femoral artery cannulation to try to evaluate the left leg primarily.  " Patient is aware that this procedure has a risk of nephrotoxicity contrast reaction and bleeding and no guarantees are made as to outcome.  If this can be corrected by catheter-based intervention at that time she would like to have that performed.  We will try to schedule this promptly.  She says for social reasons she needs to wait at least one week.       Patient Active Problem List   Diagnosis   • Chest pain   • Fatigue   • Shortness of breath   • Hypertension   • CAD (coronary artery disease)   • Tobacco use   • Dyslipidemia   • Diabetes mellitus   • Coronary artery disease involving native coronary artery of native heart without angina pectoris   • Cardiomyopathy   • Systolic congestive heart failure   • PAD (peripheral artery disease)   • Intermittent claudication   • Abnormal ankle brachial index     Signed by: Murphy White M.D.    7/17/2017    CC:  MD Miguel Torres MD Debbie Moore, , editing for Murphy White M.D.    I, Murphy White MD, have read and agree with the editing done by Ana Estes, .

## 2017-08-03 NOTE — INTERVAL H&P NOTE
H&P reviewed. The patient was examined and there are no changes to the H&P.   Cardiac stent in either March or April of this year  /78 (BP Location: Right arm, Patient Position: Lying)  Pulse 83  Temp 97.7 °F (36.5 °C) (Temporal Artery )   Resp 12  SpO2 91%   O2Sat up to 93% post cough  Lungs   Left inspiratory/expiratory wheeze, right end expiratory wheeze, doesn't clear with surgery  Regular rhythm  Review of patient's allergies indicates no known allergies.  No allergy to latex or contrast dye  Immunizations pneumo years ago  Flu neg  Tetanus ?  Tobacco 1 pack every 2-3 days  ETOH neg     Plans for aortogram and runoff today.  Patient is again aware of the risk of bleeding infection contrast reaction and nephrotoxicity and agrees to proceed.  Amy Henriquez PA-C   8/3/17  9:27

## 2017-08-04 VITALS
HEIGHT: 62 IN | WEIGHT: 174 LBS | SYSTOLIC BLOOD PRESSURE: 110 MMHG | RESPIRATION RATE: 16 BRPM | TEMPERATURE: 97.7 F | BODY MASS INDEX: 32.02 KG/M2 | OXYGEN SATURATION: 96 % | DIASTOLIC BLOOD PRESSURE: 66 MMHG | HEART RATE: 78 BPM

## 2017-08-04 LAB
ANION GAP SERPL CALCULATED.3IONS-SCNC: 3 MMOL/L (ref 3–11)
BASOPHILS # BLD AUTO: 0.03 10*3/MM3 (ref 0–0.2)
BASOPHILS NFR BLD AUTO: 0.3 % (ref 0–1)
BUN BLD-MCNC: 14 MG/DL (ref 9–23)
BUN/CREAT SERPL: 17.5 (ref 7–25)
CALCIUM SPEC-SCNC: 9 MG/DL (ref 8.7–10.4)
CHLORIDE SERPL-SCNC: 101 MMOL/L (ref 99–109)
CO2 SERPL-SCNC: 30 MMOL/L (ref 20–31)
CREAT BLD-MCNC: 0.8 MG/DL (ref 0.6–1.3)
DEPRECATED RDW RBC AUTO: 51.1 FL (ref 37–54)
EOSINOPHIL # BLD AUTO: 0.24 10*3/MM3 (ref 0–0.3)
EOSINOPHIL NFR BLD AUTO: 2.8 % (ref 0–3)
ERYTHROCYTE [DISTWIDTH] IN BLOOD BY AUTOMATED COUNT: 15.2 % (ref 11.3–14.5)
GFR SERPL CREATININE-BSD FRML MDRD: 74 ML/MIN/1.73
GLUCOSE BLD-MCNC: 138 MG/DL (ref 70–100)
HCT VFR BLD AUTO: 42.3 % (ref 34.5–44)
HGB BLD-MCNC: 13.6 G/DL (ref 11.5–15.5)
IMM GRANULOCYTES # BLD: 0.02 10*3/MM3 (ref 0–0.03)
IMM GRANULOCYTES NFR BLD: 0.2 % (ref 0–0.6)
LYMPHOCYTES # BLD AUTO: 1.89 10*3/MM3 (ref 0.6–4.8)
LYMPHOCYTES NFR BLD AUTO: 21.9 % (ref 24–44)
MCH RBC QN AUTO: 29.9 PG (ref 27–31)
MCHC RBC AUTO-ENTMCNC: 32.2 G/DL (ref 32–36)
MCV RBC AUTO: 93 FL (ref 80–99)
MONOCYTES # BLD AUTO: 0.68 10*3/MM3 (ref 0–1)
MONOCYTES NFR BLD AUTO: 7.9 % (ref 0–12)
NEUTROPHILS # BLD AUTO: 5.78 10*3/MM3 (ref 1.5–8.3)
NEUTROPHILS NFR BLD AUTO: 66.9 % (ref 41–71)
PLATELET # BLD AUTO: 177 10*3/MM3 (ref 150–450)
PMV BLD AUTO: 12.9 FL (ref 6–12)
POTASSIUM BLD-SCNC: 4.2 MMOL/L (ref 3.5–5.5)
RBC # BLD AUTO: 4.55 10*6/MM3 (ref 3.89–5.14)
SODIUM BLD-SCNC: 134 MMOL/L (ref 132–146)
WBC NRBC COR # BLD: 8.64 10*3/MM3 (ref 3.5–10.8)

## 2017-08-04 PROCEDURE — 85025 COMPLETE CBC W/AUTO DIFF WBC: CPT | Performed by: PHYSICIAN ASSISTANT

## 2017-08-04 PROCEDURE — G0378 HOSPITAL OBSERVATION PER HR: HCPCS

## 2017-08-04 PROCEDURE — 99212 OFFICE O/P EST SF 10 MIN: CPT | Performed by: THORACIC SURGERY (CARDIOTHORACIC VASCULAR SURGERY)

## 2017-08-04 PROCEDURE — 80048 BASIC METABOLIC PNL TOTAL CA: CPT | Performed by: PHYSICIAN ASSISTANT

## 2017-08-04 RX ORDER — CLOPIDOGREL BISULFATE 75 MG/1
300 TABLET ORAL ONCE
Status: COMPLETED | OUTPATIENT
Start: 2017-08-04 | End: 2017-08-04

## 2017-08-04 RX ADMIN — RANOLAZINE 500 MG: 500 TABLET, FILM COATED, EXTENDED RELEASE ORAL at 09:04

## 2017-08-04 RX ADMIN — ATORVASTATIN CALCIUM 40 MG: 40 TABLET, FILM COATED ORAL at 09:04

## 2017-08-04 RX ADMIN — HYDROCODONE BITARTRATE AND ACETAMINOPHEN 1 TABLET: 7.5; 325 TABLET ORAL at 09:15

## 2017-08-04 RX ADMIN — CITALOPRAM HYDROBROMIDE 20 MG: 20 TABLET ORAL at 09:04

## 2017-08-04 RX ADMIN — ASPIRIN 81 MG: 81 TABLET, COATED ORAL at 09:04

## 2017-08-04 RX ADMIN — CLOPIDOGREL BISULFATE 300 MG: 75 TABLET ORAL at 09:04

## 2017-08-04 RX ADMIN — SODIUM CHLORIDE 100 ML/HR: 4.5 INJECTION, SOLUTION INTRAVENOUS at 03:50

## 2017-08-04 RX ADMIN — FENOFIBRATE 145 MG: 145 TABLET ORAL at 09:04

## 2017-08-04 RX ADMIN — CARVEDILOL 6.25 MG: 6.25 TABLET, FILM COATED ORAL at 09:04

## 2017-08-04 NOTE — DISCHARGE INSTR - APPOINTMENTS
Dr. White's office staff will be calling you regarding the date for your follow up appointment.  Dr. White wants to see you in approximately 3 weeks.

## 2017-08-04 NOTE — DISCHARGE INSTR - LAB
We left a message with your family medical doctor to make an appointment for you to follow up 7-10 days after discharge.  If you don't hear from his office early next week, please call him again. Thanks.

## 2017-08-04 NOTE — PLAN OF CARE
Problem: Patient Care Overview (Adult)  Goal: Plan of Care Review  Outcome: Ongoing (interventions implemented as appropriate)    08/04/17 0949   Coping/Psychosocial Response Interventions   Plan Of Care Reviewed With patient   Patient Care Overview   Progress progress toward functional goals as expected   Outcome Evaluation   Outcome Summary/Follow up Plan Anticipating discharge to home this morning.

## 2017-08-04 NOTE — PROGRESS NOTES
CTS Progress Note       LOS: 0 days   Patient Care Team:  ANNE Beck as PCP - General (Nurse Practitioner)  MATTHEW Jansen as Physician Assistant (Cardiology)    Chief complaint; blocked artery in my leg    Vital Signs:  Temp:  [96.6 °F (35.9 °C)-98.2 °F (36.8 °C)] 97.7 °F (36.5 °C)  Heart Rate:  [70-87] 78  Resp:  [12-16] 16  BP: (100-132)/(53-83) 110/66    Physical Exam: Left posterior tibial pulses palpable right groin site satisfactory       Results:     Results from last 7 days  Lab Units 08/04/17  0532   WBC 10*3/mm3 8.64   HEMOGLOBIN g/dL 13.6   HEMATOCRIT % 42.3   PLATELETS 10*3/mm3 177       Results from last 7 days  Lab Units 08/04/17  0532   SODIUM mmol/L 134   POTASSIUM mmol/L 4.2   CHLORIDE mmol/L 101   CO2 mmol/L 30.0   BUN mg/dL 14   CREATININE mg/dL 0.80   GLUCOSE mg/dL 138*   CALCIUM mg/dL 9.0           Imaging Results (last 24 hours)     Procedure Component Value Units Date/Time    XR [facility] OR Procedure [[id number]] Resulted:  08/03/17 1154     Updated:  08/03/17 1154          Assessment    Active Problems:    PAD (peripheral artery disease)    Satisfactory progress following left superficial femoral artery stenting.  Patient states left calf pain has already resolved.    Plan   Plavix 300 mg by mouth now.  Discharge home today on aspirin and Plavix    Please note that portions of this note were completed with a voice recognition program. Efforts were made to edit the dictations, but occasionally words are mistranscribed.    Murphy White MD  08/04/17  8:38 AM

## 2017-08-04 NOTE — PLAN OF CARE
Problem: Patient Care Overview (Adult)  Goal: Plan of Care Review  Outcome: Ongoing (interventions implemented as appropriate)    08/04/17 0553   Coping/Psychosocial Response Interventions   Plan Of Care Reviewed With patient   Patient Care Overview   Progress progress toward functional goals as expected   Outcome Evaluation   Outcome Summary/Follow up Plan Pt rested well through the night. VSS. Right groin site C/D/I with tegaderm. No complaints; Will continue to monitor.

## 2017-08-04 NOTE — PROGRESS NOTES
Discharge Planning Assessment  [facility]     Patient Name: Maeve Yates  MRN: [id number]  Today's Date: 8/4/2017    Admit Date: 8/3/2017          Discharge Needs Assessment       08/04/17 0922    Living Environment    Lives With alone    Living Arrangements mobile home    Home Accessibility stairs to enter home    Number of Stairs to Enter Home 3    Stair Railings at Home outside, present at both sides    Type of Financial/Environmental Concern none    Transportation Available family or friend will provide;car    Living Environment    Provides Primary Care For no one    Quality Of Family Relationships supportive    Able to Return to Prior Living Arrangements yes    Discharge Needs Assessment    Concerns To Be Addressed no discharge needs identified    Readmission Within The Last 30 Days no previous admission in last 30 days    Anticipated Changes Related to Illness none    Equipment Currently Used at Home nebulizer    Equipment Needed After Discharge none    Discharge Disposition still a patient            Discharge Plan       08/04/17 0924    Case Management/Social Work Plan    Plan Home with help from Daughter    Additional Comments Met with Ms. Trudy at the , she lives in a mobile home next door to her daughter Amy Yates.  She has a nebulizer machine only and doesn't remember where it came from, no other [facility] or  services.  She is independant with all ADL's,   states if she needs anything her daughter is there to help.  Plan is to return home, no needs identified.           Discharge Placement     No information found        Expected Discharge Date and Time     Expected Discharge Date Expected Discharge Time    Aug 4, 2017               Demographic Summary       08/04/17 0919    Referral Information    Admission Type observation    Arrived From home or self-care    Referral Source admission list    Record Reviewed history and physical;clinical discipline documentation    Contact Information     Permission Granted to Share Information With     Primary Care Physician Information    Name Garland Encarnacion            Functional Status       08/04/17 0920    Functional Status Current    Current Functional Level Comment see nursing notes    Functional Status Prior    Ambulation 0-->independent    Transferring 0-->independent    Toileting 0-->independent    Bathing 0-->independent    Dressing 0-->independent    Eating 0-->independent    Communication 0-->understands/communicates without difficulty    Swallowing 0-->swallows foods/liquids without difficulty    IADL    Medications independent    Meal Preparation independent    Housekeeping independent    Laundry independent    Shopping independent    Oral Care independent    Activity Tolerance    Current Activity Limitations none    Usual Activity Tolerance good    Current Activity Tolerance good    Employment/Financial    Employment/Finance Comments Medicare A&B, [place] Medicaid            Psychosocial     None            Abuse/Neglect     None            Legal     None            Substance Abuse     None            Patient Forms     None          Laura Grissom RN

## 2018-01-16 DIAGNOSIS — I25.84 CORONARY ARTERY DISEASE DUE TO CALCIFIED CORONARY LESION: ICD-10-CM

## 2018-01-16 DIAGNOSIS — I10 ESSENTIAL HYPERTENSION: ICD-10-CM

## 2018-01-16 DIAGNOSIS — I25.10 CORONARY ARTERY DISEASE DUE TO CALCIFIED CORONARY LESION: ICD-10-CM

## 2018-01-16 RX ORDER — CARVEDILOL 6.25 MG/1
TABLET ORAL
Qty: 180 TABLET | Refills: 3 | Status: SHIPPED | OUTPATIENT
Start: 2018-01-16 | End: 2021-11-01 | Stop reason: ALTCHOICE

## 2018-01-16 RX ORDER — CARVEDILOL 6.25 MG/1
TABLET ORAL
Qty: 180 TABLET | Refills: 3 | Status: SHIPPED | OUTPATIENT
Start: 2018-01-16 | End: 2020-01-03 | Stop reason: SDUPTHER

## 2018-01-16 RX ORDER — CLOPIDOGREL BISULFATE 75 MG/1
TABLET ORAL
Qty: 90 TABLET | Refills: 3 | Status: SHIPPED | OUTPATIENT
Start: 2018-01-16 | End: 2020-01-03 | Stop reason: SDUPTHER

## 2018-01-16 RX ORDER — CLOPIDOGREL BISULFATE 75 MG/1
TABLET ORAL
Qty: 90 TABLET | Refills: 3 | Status: SHIPPED | OUTPATIENT
Start: 2018-01-16

## 2018-02-26 RX ORDER — RANOLAZINE 500 MG/1
TABLET, FILM COATED, EXTENDED RELEASE ORAL
Qty: 60 TABLET | Refills: 6 | Status: SHIPPED | OUTPATIENT
Start: 2018-02-26 | End: 2020-04-03 | Stop reason: SDUPTHER

## 2019-01-23 DIAGNOSIS — I25.84 CORONARY ARTERY DISEASE DUE TO CALCIFIED CORONARY LESION: ICD-10-CM

## 2019-01-23 DIAGNOSIS — I25.10 CORONARY ARTERY DISEASE DUE TO CALCIFIED CORONARY LESION: ICD-10-CM

## 2019-01-23 DIAGNOSIS — I10 ESSENTIAL HYPERTENSION: ICD-10-CM

## 2019-01-23 RX ORDER — CLOPIDOGREL BISULFATE 75 MG/1
TABLET ORAL
Qty: 90 TABLET | Refills: 3 | OUTPATIENT
Start: 2019-01-23

## 2019-01-23 RX ORDER — CARVEDILOL 6.25 MG/1
TABLET ORAL
Qty: 180 TABLET | Refills: 3 | OUTPATIENT
Start: 2019-01-23

## 2019-01-24 DIAGNOSIS — I25.84 CORONARY ARTERY DISEASE DUE TO CALCIFIED CORONARY LESION: ICD-10-CM

## 2019-01-24 DIAGNOSIS — I25.10 CORONARY ARTERY DISEASE DUE TO CALCIFIED CORONARY LESION: ICD-10-CM

## 2019-01-24 RX ORDER — CLOPIDOGREL BISULFATE 75 MG/1
TABLET ORAL
Qty: 90 TABLET | Refills: 3 | OUTPATIENT
Start: 2019-01-24

## 2019-02-28 DIAGNOSIS — I10 ESSENTIAL HYPERTENSION: ICD-10-CM

## 2019-02-28 RX ORDER — CARVEDILOL 6.25 MG/1
TABLET ORAL
Qty: 180 TABLET | Refills: 3 | OUTPATIENT
Start: 2019-02-28

## 2019-02-28 RX ORDER — RANOLAZINE 500 MG/1
TABLET, FILM COATED, EXTENDED RELEASE ORAL
Qty: 60 TABLET | Refills: 7 | OUTPATIENT
Start: 2019-02-28

## 2020-01-03 ENCOUNTER — OFFICE VISIT (OUTPATIENT)
Dept: CARDIOLOGY | Facility: CLINIC | Age: 60
End: 2020-01-03

## 2020-01-03 VITALS
BODY MASS INDEX: 34.6 KG/M2 | OXYGEN SATURATION: 96 % | SYSTOLIC BLOOD PRESSURE: 149 MMHG | WEIGHT: 188 LBS | DIASTOLIC BLOOD PRESSURE: 83 MMHG | HEART RATE: 94 BPM | HEIGHT: 62 IN

## 2020-01-03 DIAGNOSIS — I25.110 CORONARY ARTERY DISEASE INVOLVING NATIVE CORONARY ARTERY OF NATIVE HEART WITH UNSTABLE ANGINA PECTORIS (HCC): ICD-10-CM

## 2020-01-03 DIAGNOSIS — I10 ESSENTIAL HYPERTENSION: ICD-10-CM

## 2020-01-03 DIAGNOSIS — R06.02 SHORTNESS OF BREATH: ICD-10-CM

## 2020-01-03 DIAGNOSIS — I50.22 CHRONIC SYSTOLIC CONGESTIVE HEART FAILURE (HCC): Primary | ICD-10-CM

## 2020-01-03 DIAGNOSIS — R07.2 PRECORDIAL PAIN: ICD-10-CM

## 2020-01-03 PROCEDURE — 93000 ELECTROCARDIOGRAM COMPLETE: CPT | Performed by: NURSE PRACTITIONER

## 2020-01-03 PROCEDURE — 99214 OFFICE O/P EST MOD 30 MIN: CPT | Performed by: NURSE PRACTITIONER

## 2020-01-03 RX ORDER — LISINOPRIL 5 MG/1
5 TABLET ORAL DAILY
Qty: 30 TABLET | Refills: 11 | Status: SHIPPED | OUTPATIENT
Start: 2020-01-03 | End: 2020-04-03 | Stop reason: ALTCHOICE

## 2020-01-03 RX ORDER — ISOSORBIDE MONONITRATE 30 MG/1
30 TABLET, EXTENDED RELEASE ORAL DAILY
Qty: 30 TABLET | Refills: 11 | Status: SHIPPED | OUTPATIENT
Start: 2020-01-03 | End: 2021-03-08 | Stop reason: SDUPTHER

## 2020-01-03 NOTE — PATIENT INSTRUCTIONS
Steps to Quit Smoking    Smoking tobacco can be bad for your health. It can also affect almost every organ in your body. Smoking puts you and people around you at risk for many serious long-lasting (chronic) diseases. Quitting smoking is hard, but it is one of the best things that you can do for your health. It is never too late to quit.  What are the benefits of quitting smoking?  When you quit smoking, you lower your risk for getting serious diseases and conditions. They can include:  · Lung cancer or lung disease.  · Heart disease.  · Stroke.  · Heart attack.  · Not being able to have children (infertility).  · Weak bones (osteoporosis) and broken bones (fractures).  If you have coughing, wheezing, and shortness of breath, those symptoms may get better when you quit. You may also get sick less often. If you are pregnant, quitting smoking can help to lower your chances of having a baby of low birth weight.  What can I do to help me quit smoking?  Talk with your doctor about what can help you quit smoking. Some things you can do (strategies) include:  · Quitting smoking totally, instead of slowly cutting back how much you smoke over a period of time.  · Going to in-person counseling. You are more likely to quit if you go to many counseling sessions.  · Using resources and support systems, such as:  ? Online chats with a counselor.  ? Phone quitlines.  ? Printed self-help materials.  ? Support groups or group counseling.  ? Text messaging programs.  ? Mobile phone apps or applications.  · Taking medicines. Some of these medicines may have nicotine in them. If you are pregnant or breastfeeding, do not take any medicines to quit smoking unless your doctor says it is okay. Talk with your doctor about counseling or other things that can help you.  Talk with your doctor about using more than one strategy at the same time, such as taking medicines while you are also going to in-person counseling. This can help make  quitting easier.  What things can I do to make it easier to quit?  Quitting smoking might feel very hard at first, but there is a lot that you can do to make it easier. Take these steps:  · Talk to your family and friends. Ask them to support and encourage you.  · Call phone quitlines, reach out to support groups, or work with a counselor.  · Ask people who smoke to not smoke around you.  · Avoid places that make you want (trigger) to smoke, such as:  ? Bars.  ? Parties.  ? Smoke-break areas at work.  · Spend time with people who do not smoke.  · Lower the stress in your life. Stress can make you want to smoke. Try these things to help your stress:  ? Getting regular exercise.  ? Deep-breathing exercises.  ? Yoga.  ? Meditating.  ? Doing a body scan. To do this, close your eyes, focus on one area of your body at a time from head to toe, and notice which parts of your body are tense. Try to relax the muscles in those areas.  · Download or buy apps on your mobile phone or tablet that can help you stick to your quit plan. There are many free apps, such as QuitGuide from the CDC (Centers for Disease Control and Prevention). You can find more support from smokefree.gov and other websites.  This information is not intended to replace advice given to you by your health care provider. Make sure you discuss any questions you have with your health care provider.  Document Released: 10/14/2010 Document Revised: 08/15/2017 Document Reviewed: 05/03/2016  [employer] Interactive Patient Education © 2019 [employer]    Acute Coronary Syndrome    Acute coronary syndrome (ACS) is a serious problem in which there is suddenly not enough blood and oxygen reaching the heart. ACS can result in chest pain or a heart attack.  This condition is a medical emergency. If you have any symptoms of this condition, get help right away.  What are the causes?  This condition may be caused by:  · Buildup of fat and cholesterol inside of the arteries  (atherosclerosis). This is the most common cause. The buildup (plaque) can cause blood vessels in the heart (coronary arteries) to become narrow or blocked, which reduces blood flow to the heart. Plaque can also break off and lead to a clot, which can block an artery and cause a heart attack or stroke.  · Sudden tightening of the muscles around the coronary arteries (coronary spasm).  · Tearing of a coronary artery (spontaneous coronary artery dissection).  · Very low blood pressure (hypotension).  · An abnormal heartbeat (arrhythmia).  · Other medical conditions that cause a decrease of oxygen to the heart, such as anemiaorrespiratory failure.  · Using cocaine or methamphetamine.  What increases the risk?  The following factors may make you more likely to develop this condition:  · Age. The risk for ACS increases as you get older.  · History of chest pain, heart attack, peripheral artery disease, or stroke.  · Having taken chemotherapy or immune-suppressing medicines.  · Being male.  · Family history of chest pain, heart disease, or stroke.  · Smoking.  · Not exercising enough.  · Being overweight.  · High cholesterol.  · High blood pressure (hypertension).  · Diabetes.  · Excessive alcohol use.  What are the signs or symptoms?  Common symptoms of this condition include:  · Chest pain. The pain may last a long time, or it may stop and come back (recur). It may feel like:  ? Crushing or squeezing.  ? Tightness, pressure, fullness, or heaviness.  · Arm, neck, jaw, or back pain.  · Heartburn or indigestion.  · Shortness of breath.  · Nausea.  · Sudden cold sweats.  · Light-headedness.  · Dizziness, or passing out.  · Tiredness (fatigue).  Sometimes there are no symptoms.  How is this diagnosed?  This condition may be diagnosed based on:  · Your medical history and symptoms.  · An electrocardiogram (ECG). This imaging test measures the heart's electrical activity.  · Blood tests. Cardiac blood tests may need to be  repeated at designated time intervals.  · Chest X-ray.  · A CT scan of the chest.  · A coronary angiogram. This is a procedure in which dye is injected into the bloodstream and then X-rays are taken to show if there is a blockage in a coronary artery.  · Exercise stress testing.  · Echocardiography. This is a test that uses sound waves to produce detailed images of the heart.  How is this treated?  The treatment is to restore blood flow to the heart as soon as possible. Treatment for this condition may include:  · Oxygen therapy.  · Medicines, such as:  ? Antiplatelet medicines and blood-thinning medicines, such as aspirin. These help prevent blood clots.  ? Medicine that dissolves any blood clots (fibrinolytic therapy).  ? Blood pressure medicines.  ? Nitroglycerin. This helps relieve chest pain and widens blood vessels to improve blood flow.  ? Pain medicine.  ? Cholesterol-lowering medicine.  · Surgery, such as:  ? Coronary angioplasty with stent placement. This involves placing a small piece of metal that looks like mesh or a spring into a narrow coronary artery. This widens the artery and keep it open.  ? Coronary artery bypass surgery. This involves taking a section of a blood vessel from a different part of your body, and placing it on the blocked coronary artery to allow blood to flow around (bypass) the blockage.  · Cardiac rehabilitation. This is a program that helps improve your health and well-being. It includes exercise training, education, and counseling to help you recover.  Follow these instructions at home:  Eating and drinking  · Eat a heart-healthy diet that includes whole grains, fruits and vegetables, lean proteins, and low-fat or nonfat dairy products.  · Limit how much salt (sodium) you eat as told by your health care provider. Follow instructions from your health care provider about any other eating or drinking restrictions, such as limiting foods that are high in fat and processed  sugars.  · Use healthy cooking methods such as roasting, grilling, broiling, baking, poaching, steaming, or stir-frying.  · Talk with a dietitian to learn about healthy cooking methods and how to eat less sodium.  Medicines  · Take over-the-counter and prescription medicines only as told by your health care provider.  · Do not take these medicines unless your health care provider approves:  ? Vitamin supplements that contain vitamin A or vitamin E.  ? Nonsteroidal anti-inflammatory drugs (NSAIDs), such as ibuprofen, naproxen, or celecoxib.  ? Hormone replacement therapy that contains estrogen.  If you are taking blood thinners:  · Talk with your health care provider before you take any medicines that contain aspirin or NSAIDs. These medicines increase your risk for dangerous bleeding.  · Take your medicine exactly as told, at the same time every day.  · Avoid activities that could cause injury or bruising, and follow instructions about how to prevent falls.  · Wear a medical alert bracelet, and carry a card that lists what medicines you take.  Activity  · Join a cardiac rehabilitation program. An exercise plan will be developed for you.  · Ask your health care provider:  ? What activities and exercises are safe for you.  ? If you should follow specific instructions about lifting, driving, or climbing stairs.  Lifestyle  · Do not use any products that contain nicotine or tobacco, such as cigarettes and e-cigarettes. If you need help quitting, ask your health care provider.  · If your health care provider says that alcohol is safe for you, limit your alcohol intake to no more than 1 drink a day. One drink equals 12 oz of beer, 5 oz of wine, or 1½ oz of hard liquor.  · Maintain a healthy weight. If you need to lose weight, work with your health care provider to do so safely.  General instructions  · Tell all the health care providers who care for you about your heart condition, including your dentist. This may affect  the medicines or treatment you receive.  · Manage any other health conditions you have, such as hypertension or diabetes. These conditions affect your heart.  · Learn ways to manage stress.  · Get screened for depression, and get mental health treatment if you need it. People with ACS are at higher risk for depression.  · Keep your vaccinations up to date. Get the flu shot (influenza vaccine) every year.  · If directed, monitor your blood pressure at home.  · Keep all follow-up visits as told by your health care provider. This is important.  Contact a health care provider if:  · You feel overwhelmed or sad.  · You have trouble doing your daily activities.  Get help right away if:  · You have pain in your chest, neck, arm, jaw, stomach, or back that recurs, and:  ? It lasts for more than a few minutes.  ? It is not relieved by taking the medicineyour health care provider prescribed.  · You have unexplained:  ? Heavy sweating.  ? Heartburn or indigestion.  ? Nausea or vomiting.  ? Shortness of breath.  ? Difficulty breathing.  ? Fatigue.  ? Nervousness or anxiety.  ? Weakness.  ? Diarrhea.  ? Dark stools or blood in your stool.  · You have sudden light-headedness or dizziness.  · Your blood pressure is higher than 180/120.  · You faint.  · You have thoughts about hurting yourself.  These symptoms may represent a serious problem that is an emergency. Do not wait to see if the symptoms will go away. Get medical help right away. Call your local emergency services (831 in the [place] Do not drive yourself to the hospital.   If you ever feel like you may hurt yourself or others, or have thoughts about taking your own life, get help right away. You can go to your nearest emergency department or call:  · Emergency services (929 in the [place]  · A suicide crisis helpline, such as the National Suicide Prevention Lifeline at 1-991.697.7552. This is open 24 hours a day.  Summary  · Acute coronary syndrome (ACS) is when there is  not enough blood and oxygen being supplied to the heart. ACS can result in chest pain or a heart attack.  · Acute coronary syndrome is a medical emergency. If you have any symptoms of this condition, get help right away.  · Treatment includes medicines and procedures to open the blocked arteries and restore blood flow.  This information is not intended to replace advice given to you by your health care provider. Make sure you discuss any questions you have with your health care provider.  Document Released: 12/18/2006 Document Revised: 08/28/2018 Document Reviewed: 08/28/2018  [employer] Interactive Patient Education © 2019 [employer]

## 2020-01-03 NOTE — PROGRESS NOTES
Dexter Yates is a 59 y.o. female who presents to day for Chest Pain (Last Seen 2017).    CHIEF COMPLIANT  Chief Complaint   Patient presents with   • Chest Pain     Last Seen 2017       Active Problems:  Problem List Items Addressed This Visit        Cardiovascular and Mediastinum    Hypertension    Relevant Medications    lisinopril (PRINIVIL,ZESTRIL) 5 MG tablet    Other Relevant Orders    Adult Transthoracic Echo Complete W/ Cont if Necessary Per Protocol    Stress Test With Myocardial Perfusion One Day    CAD (coronary artery disease)    Relevant Medications    isosorbide mononitrate (IMDUR) 30 MG 24 hr tablet    Other Relevant Orders    Adult Transthoracic Echo Complete W/ Cont if Necessary Per Protocol    Stress Test With Myocardial Perfusion One Day    Systolic congestive heart failure (CMS/HCC) - Primary    Relevant Medications    lisinopril (PRINIVIL,ZESTRIL) 5 MG tablet    isosorbide mononitrate (IMDUR) 30 MG 24 hr tablet    Other Relevant Orders    Adult Transthoracic Echo Complete W/ Cont if Necessary Per Protocol    Stress Test With Myocardial Perfusion One Day       Respiratory    Shortness of breath    Relevant Orders    Adult Transthoracic Echo Complete W/ Cont if Necessary Per Protocol    Stress Test With Myocardial Perfusion One Day       Nervous and Auditory    Chest pain    Relevant Medications    isosorbide mononitrate (IMDUR) 30 MG 24 hr tablet      PROBLEM LIST:  1. Coronary artery disease. Catheterization in 2009 at [facility] demonstrated 30%  circumflex disease with 50% posterolateral branch.   1.1 follow-up catheterization in September 2016 because of non-ST elevation myocardial infarction demonstrated a total occlusion of the right coronary artery with 90% high-grade LAD disease and 50-70% circumflex disease. Stenting of the LAD was performed. Medical management recommended and percutaneous intervention in the circumflex if patient fails medical therapy.  1.2  follow-up catheterization March 2017 with stenting of the circumflex. Patent stent to the LAD with chronic total occlusion of the right coronary artery. Medical management recommended  2. Ischemic cardiomyopathy  2.1 anterior infarct and ejection fraction of 30-35%  2.2 EF by ventriculography at catheterization was 40%  3. Hypertension.   4. Chronic tobacco habituation.   5. Dyslipidemia.   6.  Angioplasty to left lower extremity per Dr. White 8/2/17    [facility]  HPI  Ms. chaves is a 59-year-old female with known significant coronary artery disease status post stenting is also had peripheral vascular disease with angioplasty.  Patient is being seen today due to chest pain which she has had to take nitroglycerin for.  Patient states that she but for couple months she is been having chest pressure sort of described as a pressure to a dull type sensation.  Associated symptoms include shortness of air and weakness.  She states that activity does make it worse and nitroglycerin does help relieve the symptoms.  This pain also occurs at rest.  Each episode lasts approximately 1 minute or so.  It is been happening on a weekly basis to the point where it occurs couple times weekly.  And on some occasions occurs 2-3 times a day.  In addition patient has shortness of air which is worse with exertion and also occurs at rest.  States that she does have a positive cough with clear to white sputum production.  Also reports that wheezing that started this morning.  Palpitations occur with activity as feels as if her heart is beating strangely and this also occurs a couple times a week.  Patient reports bilateral lower extremity leg swelling on occasion but usually resolves after night.  Fatigue was reported as if she gets tired very easily.  States that doing common household chores wears her out.  Indicating a decreased exercise tolerance.  Patient also reports that if she stands too quickly she becomes dizzy and  has even led to her  falling.  Patient reports PND and orthopnea to the point where she is using 4 pillows to aid in her sleeping.  And breathing.  Patient denies any syncope, or other neurological changes.  PRIOR MEDS  Current Outpatient Medications on File Prior to Visit   Medication Sig Dispense Refill   • albuterol (PROVENTIL HFA;VENTOLIN HFA) 108 (90 BASE) MCG/ACT inhaler Inhale 2 puffs every 4 (four) hours as needed for wheezing or shortness of air.     • albuterol (PROVENTIL) (2.5 MG/3ML) 0.083% nebulizer solution Take 2.5 mg by nebulization Every 4 (Four) Hours As Needed for wheezing.     • aspirin 81 MG EC tablet TAKE ONE TABLET BY MOUTH DAILY 30 tablet 11   • carvedilol (COREG) 6.25 MG tablet TAKE ONE TABLET BY MOUTH TWO TIMES A DAY WITH MEALS 180 tablet 3   • citalopram (CeleXA) 20 MG tablet Take 20 mg by mouth Daily.  1   • clopidogrel (PLAVIX) 75 MG tablet TAKE 1 TABLET BY MOUTH DAILY. 90 tablet 3   • fenofibrate (TRICOR) 145 MG tablet Take 145 mg by mouth Daily.  5   • gabapentin (NEURONTIN) 800 MG tablet Take 800 mg by mouth 3 (Three) Times a Day.     • linaclotide (LINZESS) 145 MCG capsule capsule Take 145 mcg by mouth Every Morning Before Breakfast.     • metFORMIN (GLUCOPHAGE) 1000 MG tablet Take 1,000 mg by mouth 2 (two) times a day with meals.     • nitroglycerin (NITROSTAT) 0.4 MG SL tablet Place 0.4 mg under the tongue every 5 (five) minutes as needed for chest pain.     • RANEXA 500 MG 12 hr tablet TAKE ONE TABLET BY MOUTH 2 TIMES A DAY 60 tablet 6     No current facility-administered medications on file prior to visit.        ALLERGIES  Patient has no known allergies.    HISTORY  Past Medical History:   Diagnosis Date   • CAD (coronary artery disease)     Catheterization in 2009 at [facility] demonstrated 30% circumflex disease with 50% posterolateral branch.    • Chest pain    • COPD (chronic obstructive pulmonary disease) (CMS/[facility], located [facility])    • Depression    • Diabetes mellitus (CMS/[facility], located within 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    • Peripheral vascular disease (CMS/HCC)    • Shortness of breath    • Tobacco use     smokes 2 packs a week   • Wears glasses        Social History     Socioeconomic History   • Marital status:      Spouse name: Not on file   • Number of children: Not on file   • Years of education: Not on file   • Highest education level: Not on file   Tobacco Use   • Smoking status: Current Some Day Smoker     Packs/day: 0.50     Years: 40.00     Pack years: 20.00     Types: Cigarettes   • Smokeless tobacco: Never Used   • Tobacco comment: Has smoked up to 1 1/2 ppd; currently smoking 2 packs per week, has cut down and is trying to quit   Substance and Sexual Activity   • Alcohol use: No   • Drug use: No   • Sexual activity: Defer       Family History   Problem Relation Age of Onset   • Hypertension Mother    • Hyperlipidemia Mother    • Skin cancer Mother    • No Known Problems Father        Review of Systems   Constitutional: Positive for fatigue (doing laundry wears out). Negative for chills and fever.   HENT: Positive for congestion.    Eyes: Positive for visual disturbance (glasses).   Respiratory: Positive for cough, chest tightness, shortness of breath (antwan with activity) and wheezing.    Cardiovascular: Positive for chest pain, palpitations and leg swelling (at times).   Gastrointestinal: Negative.  Negative for abdominal pain, blood in stool, constipation, diarrhea, nausea and vomiting.   Endocrine: Negative for cold intolerance and heat intolerance.   Genitourinary: Negative.  Negative for dysuria, frequency, hematuria and urgency.   Musculoskeletal: Negative.  Negative for back pain and neck pain.   Skin: Negative.  Negative for rash and wound.   Allergic/Immunologic: Negative.   "Negative for environmental allergies and food allergies.   Neurological: Positive for dizziness (if standing up to fast, has fallen a couple of times). Negative for syncope and light-headedness.   Hematological: Negative.  Does not bruise/bleed easily.   Psychiatric/Behavioral: Positive for sleep disturbance (night before chest pressure when about to go asleep).       Objective     VITALS: /83 (BP Location: Left arm, Patient Position: Sitting)   Pulse 94   Ht 157.5 cm (62\")   Wt 85.3 kg (188 lb)   SpO2 96%   BMI 34.39 kg/m²     LABS:   Lab Results (most recent)     None          IMAGING:   No Images in the past 120 days found..    EXAM:  Physical Exam   Constitutional: She is oriented to person, place, and time. She appears well-developed and well-nourished.   Eyes: Pupils are equal, round, and reactive to light. EOM are normal.   Neck: Trachea normal and phonation normal. Neck supple. No JVD present. Carotid bruit is not present. No thyroid mass present.   Cardiovascular: Normal rate, regular rhythm, normal heart sounds and intact distal pulses. Exam reveals no gallop and no friction rub.   No murmur heard.  Pulses:       Radial pulses are 2+ on the right side, and 2+ on the left side.        Posterior tibial pulses are 2+ on the right side, and 2+ on the left side.   Pulmonary/Chest: Effort normal. No respiratory distress. She has wheezes in the right upper field, the right middle field, the right lower field, the left upper field, the left middle field and the left lower field. She has no rales.   Abdominal: Soft. Bowel sounds are normal. She exhibits no distension and no abdominal bruit. There is no tenderness.   Musculoskeletal: Normal range of motion.   Neurological: She is alert and oriented to person, place, and time. She has normal strength. No cranial nerve deficit or sensory deficit.   Skin: Skin is warm, dry and intact. Capillary refill takes less than 2 seconds. No rash noted.   Psychiatric: " She has a normal mood and affect. Her speech is normal and behavior is normal. Judgment and thought content normal. Cognition and memory are normal.   Nursing note and vitals reviewed.      Procedure     ECG 12 Lead  Date/Time: 1/3/2019 10:54 AM  Performed by: Mrashall Oquendo APRN  Authorized by: Marshall Oquendo APRN   Rate: normal  BPM: 88  QRS axis: normal  Other findings: non-specific ST-T wave changes and T wave abnormality    Clinical impression: non-specific ECG               Assessment/Plan    Diagnosis Plan   1. Chronic systolic congestive heart failure (CMS/HCC)  lisinopril (PRINIVIL,ZESTRIL) 5 MG tablet    Adult Transthoracic Echo Complete W/ Cont if Necessary Per Protocol    Stress Test With Myocardial Perfusion One Day   2. Essential hypertension  Adult Transthoracic Echo Complete W/ Cont if Necessary Per Protocol    Stress Test With Myocardial Perfusion One Day   3. Coronary artery disease involving native coronary artery of native heart with unstable angina pectoris (CMS/HCC)  Adult Transthoracic Echo Complete W/ Cont if Necessary Per Protocol    Stress Test With Myocardial Perfusion One Day    isosorbide mononitrate (IMDUR) 30 MG 24 hr tablet   4. Shortness of breath  Adult Transthoracic Echo Complete W/ Cont if Necessary Per Protocol    Stress Test With Myocardial Perfusion One Day   5. Precordial pain  isosorbide mononitrate (IMDUR) 30 MG 24 hr tablet   1.  Patient does have known chronic systolic heart failure in which we will further evaluate with echocardiogram.  Due to worsening of symptoms.  2.  Patient does have essential hypertension.  In which she is 149/83 today.  We will place patient on lisinopril for aid in blood pressure management and to assist with heart failure management.  3.  Patient has known coronary artery disease and worsening of symptoms.  Will further evaluate with ischemic work-up including stress test and echocardiogram.  4.  Due to patient's relatively frequent chest  pain I think it is appropriate to place patient on Imdur 30 mg daily.  Also patient has sublingual nitroglycerin which she was encouraged and reeducated to take 1 sublingual nitroglycerin sublingually up to 3 doses 5 minutes apart if development of chest pain.  And seek emergent treatment if chest pain is not relieved by third nitroglycerin.  5  Patient informed of signs and symptoms of ACS and advised to seek emergent treatment for any new or worsening symptoms.  Patient also advised to seek sooner follow-up as needed.    Return in about 3 months (around 4/3/2020), or if symptoms worsen or fail to improve.    Maeve was seen today for chest pain.    Diagnoses and all orders for this visit:    Chronic systolic congestive heart failure (CMS/HCC)  -     lisinopril (PRINIVIL,ZESTRIL) 5 MG tablet; Take 1 tablet by mouth Daily.  -     Adult Transthoracic Echo Complete W/ Cont if Necessary Per Protocol; Future  -     Stress Test With Myocardial Perfusion One Day; Future    Essential hypertension  -     Adult Transthoracic Echo Complete W/ Cont if Necessary Per Protocol; Future  -     Stress Test With Myocardial Perfusion One Day; Future    Coronary artery disease involving native coronary artery of native heart with unstable angina pectoris (CMS/HCC)  -     Adult Transthoracic Echo Complete W/ Cont if Necessary Per Protocol; Future  -     Stress Test With Myocardial Perfusion One Day; Future  -     isosorbide mononitrate (IMDUR) 30 MG 24 hr tablet; Take 1 tablet by mouth Daily.    Shortness of breath  -     Adult Transthoracic Echo Complete W/ Cont if Necessary Per Protocol; Future  -     Stress Test With Myocardial Perfusion One Day; Future    Precordial pain  -     isosorbide mononitrate (IMDUR) 30 MG 24 hr tablet; Take 1 tablet by mouth Daily.        Maeve A Page  reports that she has been smoking cigarettes. She has a 20.00 pack-year smoking history. She has never used smokeless tobacco.. I have educated her on the  risk of diseases from using tobacco products such as cancer, COPD and heart diease.     I advised her to quit and she is willing to quit. We have discussed the following method/s for tobacco cessation:  Counseling.  I spent 3  minutes counseling the patient.           Patient's Body mass index is 34.39 kg/m². BMI is above normal parameters. Recommendations include: educational material.           MEDS ORDERED DURING VISIT:  New Medications Ordered This Visit   Medications   • lisinopril (PRINIVIL,ZESTRIL) 5 MG tablet     Sig: Take 1 tablet by mouth Daily.     Dispense:  30 tablet     Refill:  11   • isosorbide mononitrate (IMDUR) 30 MG 24 hr tablet     Sig: Take 1 tablet by mouth Daily.     Dispense:  30 tablet     Refill:  11           This document has been electronically signed by Marshall Oquendo Jr., APRN  January 6, 2020 12:03 AM

## 2020-01-20 ENCOUNTER — APPOINTMENT (OUTPATIENT)
Dept: CARDIOLOGY | Facility: HOSPITAL | Age: 60
End: 2020-01-20

## 2020-01-20 ENCOUNTER — HOSPITAL ENCOUNTER (OUTPATIENT)
Dept: CARDIOLOGY | Facility: HOSPITAL | Age: 60
End: 2020-01-20

## 2020-04-03 ENCOUNTER — OFFICE VISIT (OUTPATIENT)
Dept: CARDIOLOGY | Facility: CLINIC | Age: 60
End: 2020-04-03

## 2020-04-03 DIAGNOSIS — I25.10 CORONARY ARTERY DISEASE INVOLVING NATIVE CORONARY ARTERY OF NATIVE HEART, ANGINA PRESENCE UNSPECIFIED: ICD-10-CM

## 2020-04-03 DIAGNOSIS — I50.22 CHRONIC SYSTOLIC CONGESTIVE HEART FAILURE (HCC): ICD-10-CM

## 2020-04-03 DIAGNOSIS — I25.5 ISCHEMIC CARDIOMYOPATHY: ICD-10-CM

## 2020-04-03 DIAGNOSIS — I73.9 PAD (PERIPHERAL ARTERY DISEASE) (HCC): ICD-10-CM

## 2020-04-03 DIAGNOSIS — I10 ESSENTIAL (PRIMARY) HYPERTENSION: ICD-10-CM

## 2020-04-03 DIAGNOSIS — R07.2 PRECORDIAL PAIN: Primary | ICD-10-CM

## 2020-04-03 PROBLEM — E78.5 HLD (HYPERLIPIDEMIA): Status: ACTIVE | Noted: 2020-04-03

## 2020-04-03 PROCEDURE — 99214 OFFICE O/P EST MOD 30 MIN: CPT | Performed by: NURSE PRACTITIONER

## 2020-04-03 RX ORDER — RANOLAZINE 1000 MG/1
1000 TABLET, EXTENDED RELEASE ORAL EVERY 12 HOURS SCHEDULED
Qty: 60 TABLET | Refills: 11 | Status: SHIPPED | OUTPATIENT
Start: 2020-04-03 | End: 2020-06-18

## 2020-04-03 RX ORDER — DOXYCYCLINE HYCLATE 100 MG/1
100 CAPSULE ORAL 2 TIMES DAILY
COMMUNITY
End: 2021-11-01

## 2020-04-03 RX ORDER — LORATADINE 10 MG/1
10 TABLET ORAL DAILY
COMMUNITY
End: 2021-11-01

## 2020-04-03 NOTE — PROGRESS NOTES
Subjective      You have chosen to receive care through a telephone visit today. Do you consent to use a telephone visit for your medical care today? Yes      Maeve Yates is a 59 y.o. female who presents to day for Coronary Artery Disease.    CHIEF COMPLIANT  Chief Complaint   Patient presents with   • Coronary Artery Disease       Active Problems:  Problem List Items Addressed This Visit        Cardiovascular and Mediastinum    Essential (primary) hypertension    CAD (coronary artery disease)    Relevant Medications    ranolazine (RANEXA) 1000 MG 12 hr tablet    Cardiomyopathy (CMS/HCC)    Relevant Medications    ranolazine (RANEXA) 1000 MG 12 hr tablet    Systolic congestive heart failure (CMS/HCC)    Relevant Medications    ranolazine (RANEXA) 1000 MG 12 hr tablet    PAD (peripheral artery disease) (CMS/HCC)       Nervous and Auditory    Chest pain - Primary    Relevant Medications    ranolazine (RANEXA) 1000 MG 12 hr tablet      PROBLEM LIST:  1. Coronary artery disease. Catheterization in 2009 at [facility] demonstrated 30%  circumflex disease with 50% posterolateral branch.   1.1 follow-up catheterization in September 2016 because of non-ST elevation myocardial infarction demonstrated a total occlusion of the right coronary artery with 90% high-grade LAD disease and 50-70% circumflex disease. Stenting of the LAD was performed. Medical management recommended and percutaneous intervention in the circumflex if patient fails medical therapy.  1.2 follow-up catheterization March 2017 with stenting of the circumflex. Patent stent to the LAD with chronic total occlusion of the right coronary artery. Medical management recommended  2. Ischemic cardiomyopathy  2.1 anterior infarct and ejection fraction of 30-35%  2.2 EF by ventriculography at catheterization was 40%  3. Hypertension.   4. Chronic tobacco habituation.   5. Dyslipidemia.   6.  Angioplasty to left lower extremity per Dr. White  8/2/17    [facility]  MACY Johnson is a 59-year-old female who is being evaluated today for coronary artery disease and chest pain.  Patient has known coronary artery disease with residual stenosis from previous left heart catheterization in 2017.  Patient is experiencing continuation of chest pain in which she has had to take nitroglycerin for.  Patient states that she has had a going on for at least a 5 or 6 months.  Associated symptoms include shortness of air and weakness.  States that activity does not seem to make the pain worse the nitroglycerin does help seem to relieve the symptoms.  The pain also occurs at rest.  Each episode lasts for approximately 1 minute and is intermittent in nature.  Is been happen on a weekly basis to the point where it occurs a couple times weekly and then may go 3 to 4 days without having any symptoms then it reoccurs.  And on some occasion occurs as often as 2-3 times a day.  Patient shortness of breath is chronic in nature and is worse with exertion and has an associated cough.  She says that she does have shortness of breath at rest and often wakes up coughing gasping for air.  She also reports associated wheezing that is intermittent.  Continued bilateral lower extremity leg swelling on occasion but usually resolves overnight and is mainly in the feet and ankles.  Says that she is fatigued to the point or evening during her routine tasks around the house makes her tired.  She says she is also not sleeping very well in which she has been for the past 7 years.  Says that she has not slept well since her  passed away.  She does use multiple pillows up to 4 to help her with breathing to and to allow her to sleep better when she is able to.  Dizziness occurs when she is up walking around on occasion.  She says that she does experience fast heart rates which could be interpreted as palpitations.  This only occurs on occasion.  She denies any syncope, or other neurological  changes.  PRIOR MEDS  Current Outpatient Medications on File Prior to Visit   Medication Sig Dispense Refill   • albuterol (PROVENTIL HFA;VENTOLIN HFA) 108 (90 BASE) MCG/ACT inhaler Inhale 2 puffs every 4 (four) hours as needed for wheezing or shortness of air.     • aspirin 81 MG EC tablet TAKE ONE TABLET BY MOUTH DAILY 30 tablet 11   • carvedilol (COREG) 6.25 MG tablet TAKE ONE TABLET BY MOUTH TWO TIMES A DAY WITH MEALS 180 tablet 3   • citalopram (CeleXA) 20 MG tablet Take 20 mg by mouth Daily.  1   • clopidogrel (PLAVIX) 75 MG tablet TAKE 1 TABLET BY MOUTH DAILY. 90 tablet 3   • doxycycline (VIBRAMYCIN) 100 MG capsule Take 100 mg by mouth 2 (Two) Times a Day. 10 days     • fenofibrate (TRICOR) 145 MG tablet Take 145 mg by mouth Daily.  5   • gabapentin (NEURONTIN) 800 MG tablet Take 800 mg by mouth 3 (Three) Times a Day.     • isosorbide mononitrate (IMDUR) 30 MG 24 hr tablet Take 1 tablet by mouth Daily. 30 tablet 11   • linaclotide (LINZESS) 145 MCG capsule capsule Take 145 mcg by mouth Every Morning Before Breakfast.     • loratadine (Claritin) 10 MG tablet Take 10 mg by mouth Daily.     • metFORMIN (GLUCOPHAGE) 1000 MG tablet Take 1,000 mg by mouth 2 (two) times a day with meals.     • nitroglycerin (NITROSTAT) 0.4 MG SL tablet Place 0.4 mg under the tongue every 5 (five) minutes as needed for chest pain.     • [DISCONTINUED] RANEXA 500 MG 12 hr tablet TAKE ONE TABLET BY MOUTH 2 TIMES A DAY 60 tablet 6   • albuterol (PROVENTIL) (2.5 MG/3ML) 0.083% nebulizer solution Take 2.5 mg by nebulization Every 4 (Four) Hours As Needed for wheezing.     • [DISCONTINUED] lisinopril (PRINIVIL,ZESTRIL) 5 MG tablet Take 1 tablet by mouth Daily. 30 tablet 11     No current facility-administered medications on file prior to visit.        ALLERGIES  Patient has no known allergies.    HISTORY  Past Medical History:   Diagnosis Date   • CAD (coronary artery disease)     Catheterization in 2009 at [facility] demonstrated 30%  circumflex disease with 50% posterolateral branch.    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 teeth    • Peripheral vascular disease (CMS/HCC)    • Shortness of breath    • Tobacco use     smokes 2 packs a week   • Wears glasses        Social History     Socioeconomic History   • Marital status:      Spouse name: Not on file   • Number of children: Not on file   • Years of education: Not on file   • Highest education level: Not on file   Tobacco Use   • Smoking status: Current Some Day Smoker     Packs/day: 0.50     Years: 40.00     Pack years: 20.00     Types: Cigarettes   • Smokeless tobacco: Never Used   • Tobacco comment: Has smoked up to 1 1/2 ppd; currently smoking 2 packs per week, has cut down and is trying to quit   Substance and Sexual Activity   • Alcohol use: No   • Drug use: No   • Sexual activity: Defer       Family History   Problem Relation Age of Onset   • Hypertension Mother    • Hyperlipidemia Mother    • Skin cancer Mother    • No Known Problems Father        Review of Systems   Constitutional: Positive for fatigue. Negative for chills and fever.   HENT: Negative.  Negative for congestion.    Eyes: Positive for visual disturbance (glasses).   Respiratory: Positive for chest tightness and shortness of breath.    Cardiovascular: Positive for chest pain, palpitations and leg swelling.   Gastrointestinal: Negative.  Negative for abdominal pain, blood in stool, constipation, diarrhea, nausea and vomiting.   Endocrine: Negative.  Negative for cold intolerance and heat intolerance.   Genitourinary: Negative.  Negative for dysuria, frequency, hematuria and urgency.    Musculoskeletal: Negative.  Negative for arthralgias, back pain and neck pain.   Skin: Negative.  Negative for rash and wound.   Neurological: Positive for dizziness (moving around). Negative for syncope and light-headedness.   Hematological: Negative.  Does not bruise/bleed easily.   Psychiatric/Behavioral: Positive for sleep disturbance (not sleeping well, wakes coughing smothering, denies cp).       Objective     VITALS: There were no vitals taken for this visit.    LABS:   Lab Results (most recent)     None          IMAGING:   No Images in the past 120 days found..    EXAM:  Physical Exam  I spoke with Ms. chaves over the phone and during the examination I did not hear any labored breathing, clarity of speech was appropriate and no audible wheezing was noted.  Respiratory rate seemed appropriate for situation.  She is able to speak in full sentences.  She seemed appropriate for situation and in no apparent distress.  She is able to answer questions appropriately and participate in the conversation.  Procedure   Procedures       Assessment/Plan    Diagnosis Plan   1. Precordial pain  ranolazine (RANEXA) 1000 MG 12 hr tablet   2. Coronary artery disease involving native coronary artery of native heart, angina presence unspecified     3. Ischemic cardiomyopathy     4. Chronic systolic congestive heart failure (CMS/HCC)     5. PAD (peripheral artery disease) (CMS/HCC)     6. Essential (primary) hypertension     1.  Patient previously scheduled for stress and echo from previous visit but was unable to go to appointment.  Patient verbalized that she is scared to have the test done due to her experience last time where she suffered significant symptoms.  I informed her that she would be receiving Lexiscan which is unlikely to be the same chemical used in previous stress test to the hospital.  I will try to obtain those records to verify.  Patient agreed to reschedule the stress test and echocardiogram for ischemic  evaluation of her chest pain shortness of breath with known coronary artery disease and multiple risk factors as mentioned above.  2.  Patient had stopped lisinopril on her own due to the fact that she said it caused her headaches.  Patient needs to be either on ACE or ARB due to her systolic heart failure which would be guideline driven medical therapy.  Will discuss further on her follow-up.  3.  Due to patient's persistent chest pain I will increase her Ranexa to thousand milligrams twice daily.  She is going to call me in 2 weeks to determine if there is any benefit in increasing the Ranexa.  If patient has experienced no benefit we will start titrating up the isosorbide.  I chose to hold off on the isosorbide due to patient's already having headaches.  4.  Patient has sublingual nitroglycerin and I reinforced that she can take 1 tablet under tongue every 5 minutes up to 3 doses.  If she still having chest pain at that time she would need to seek emergent care.  5.  Patient does have a history of essential hypertension but we were unable to have her check her blood pressure at home due to the lack of having a blood pressure cuff.  She did say that she seen ANNE Ayala at [facility] and at that time her blood pressure was normal.  6.  Informed of signs and symptoms of ACS and advised to seek emergent treatment for any new worsening symptoms.  Patient also advised sooner follow-up as needed.  Also advised to follow-up with family doctor as needed  7.  Patient does have a history peripheral artery disease which she is previously been treated by Dr. White.  She does have a an appointment with a physician at [facility] [place] for further evaluation of her other lower extremity.  This will occur in April.    No follow-ups on file.    Maeve was seen today for coronary artery disease.    Diagnoses and all orders for this visit:    Precordial pain  -     ranolazine (RANEXA) 1000 MG 12 hr tablet;  Take 1 tablet by mouth Every 12 (Twelve) Hours.    Coronary artery disease involving native coronary artery of native heart, angina presence unspecified    Ischemic cardiomyopathy    Chronic systolic congestive heart failure (CMS/HCC)    PAD (peripheral artery disease) (CMS/HCC)    Essential (primary) hypertension        Maeve A Page  reports that she has been smoking cigarettes. She has a 20.00 pack-year smoking history. She has never used smokeless tobacco.. I have educated her on the risk of diseases from using tobacco products such as cancer, COPD and heart diease.     I advised her to quit and she is not willing to quit.    I spent 3  minutes counseling the patient.       Office visit changed to telephone visit related to Covid-19 per Governor [place].   This visit has been rescheduled as a phone visit to comply with patient safety concerns in accordance with CDC recommendations. Total time of discussion was 15 minutes.             MEDS ORDERED DURING VISIT:  New Medications Ordered This Visit   Medications   • ranolazine (RANEXA) 1000 MG 12 hr tablet     Sig: Take 1 tablet by mouth Every 12 (Twelve) Hours.     Dispense:  60 tablet     Refill:  11           This document has been electronically signed by Marshall Oquendo Jr., APRN  April 3, 2020 10:02

## 2020-06-18 DIAGNOSIS — R07.2 PRECORDIAL PAIN: ICD-10-CM

## 2020-06-18 RX ORDER — RANOLAZINE 1000 MG/1
TABLET, EXTENDED RELEASE ORAL
Qty: 60 TABLET | Refills: 11 | Status: SHIPPED | OUTPATIENT
Start: 2020-06-18 | End: 2021-07-20

## 2020-07-07 ENCOUNTER — TELEPHONE (OUTPATIENT)
Dept: CARDIOLOGY | Facility: CLINIC | Age: 60
End: 2020-07-07

## 2021-01-04 DIAGNOSIS — R07.2 PRECORDIAL PAIN: ICD-10-CM

## 2021-01-04 DIAGNOSIS — I25.110 CORONARY ARTERY DISEASE INVOLVING NATIVE CORONARY ARTERY OF NATIVE HEART WITH UNSTABLE ANGINA PECTORIS (HCC): ICD-10-CM

## 2021-01-04 RX ORDER — ISOSORBIDE MONONITRATE 30 MG/1
TABLET, EXTENDED RELEASE ORAL
Qty: 30 TABLET | Refills: 11 | OUTPATIENT
Start: 2021-01-04

## 2021-02-03 DIAGNOSIS — I25.110 CORONARY ARTERY DISEASE INVOLVING NATIVE CORONARY ARTERY OF NATIVE HEART WITH UNSTABLE ANGINA PECTORIS (HCC): ICD-10-CM

## 2021-02-03 DIAGNOSIS — R07.2 PRECORDIAL PAIN: ICD-10-CM

## 2021-02-03 RX ORDER — ISOSORBIDE MONONITRATE 30 MG/1
TABLET, EXTENDED RELEASE ORAL
Qty: 30 TABLET | Refills: 11 | OUTPATIENT
Start: 2021-02-03

## 2021-03-08 DIAGNOSIS — R07.2 PRECORDIAL PAIN: ICD-10-CM

## 2021-03-08 DIAGNOSIS — I25.110 CORONARY ARTERY DISEASE INVOLVING NATIVE CORONARY ARTERY OF NATIVE HEART WITH UNSTABLE ANGINA PECTORIS (HCC): ICD-10-CM

## 2021-03-08 RX ORDER — NITROGLYCERIN 0.4 MG/1
0.4 TABLET SUBLINGUAL
Qty: 25 TABLET | Refills: 3 | Status: SHIPPED | OUTPATIENT
Start: 2021-03-08 | End: 2022-05-20 | Stop reason: SDUPTHER

## 2021-03-08 RX ORDER — ISOSORBIDE MONONITRATE 30 MG/1
30 TABLET, EXTENDED RELEASE ORAL DAILY
Qty: 30 TABLET | Refills: 11 | Status: SHIPPED | OUTPATIENT
Start: 2021-03-08 | End: 2022-02-07

## 2021-07-19 DIAGNOSIS — R07.2 PRECORDIAL PAIN: ICD-10-CM

## 2021-07-20 RX ORDER — RANOLAZINE 1000 MG/1
TABLET, EXTENDED RELEASE ORAL
Qty: 60 TABLET | Refills: 11 | Status: SHIPPED | OUTPATIENT
Start: 2021-07-20 | End: 2022-02-07

## 2021-11-01 ENCOUNTER — OFFICE VISIT (OUTPATIENT)
Dept: CARDIOLOGY | Facility: CLINIC | Age: 61
End: 2021-11-01

## 2021-11-01 VITALS
HEIGHT: 62 IN | SYSTOLIC BLOOD PRESSURE: 108 MMHG | WEIGHT: 201.8 LBS | HEART RATE: 77 BPM | DIASTOLIC BLOOD PRESSURE: 71 MMHG | OXYGEN SATURATION: 98 % | BODY MASS INDEX: 37.13 KG/M2

## 2021-11-01 DIAGNOSIS — R06.02 SHORTNESS OF BREATH: Primary | ICD-10-CM

## 2021-11-01 DIAGNOSIS — I25.110 CORONARY ARTERY DISEASE INVOLVING NATIVE CORONARY ARTERY OF NATIVE HEART WITH UNSTABLE ANGINA PECTORIS (HCC): ICD-10-CM

## 2021-11-01 DIAGNOSIS — I10 ESSENTIAL (PRIMARY) HYPERTENSION: ICD-10-CM

## 2021-11-01 DIAGNOSIS — I25.5 ISCHEMIC CARDIOMYOPATHY: ICD-10-CM

## 2021-11-01 DIAGNOSIS — I50.20 HFREF (HEART FAILURE WITH REDUCED EJECTION FRACTION) (HCC): ICD-10-CM

## 2021-11-01 PROCEDURE — 99214 OFFICE O/P EST MOD 30 MIN: CPT | Performed by: NURSE PRACTITIONER

## 2021-11-01 PROCEDURE — 93000 ELECTROCARDIOGRAM COMPLETE: CPT | Performed by: NURSE PRACTITIONER

## 2021-11-01 RX ORDER — SPIRONOLACTONE 25 MG/1
12.5 TABLET ORAL DAILY
Qty: 15 TABLET | Refills: 11 | Status: SHIPPED | OUTPATIENT
Start: 2021-11-01 | End: 2021-11-29

## 2021-11-01 RX ORDER — NICOTINE 21 MG/24HR
1 PATCH, TRANSDERMAL 24 HOURS TRANSDERMAL DAILY
COMMUNITY
Start: 2021-08-10 | End: 2021-11-01

## 2021-11-01 RX ORDER — INSULIN ASPART 100 [IU]/ML
INJECTION, SOLUTION INTRAVENOUS; SUBCUTANEOUS
COMMUNITY
Start: 2021-08-10

## 2021-11-01 RX ORDER — POTASSIUM CHLORIDE 750 MG/1
10 TABLET, FILM COATED, EXTENDED RELEASE ORAL AS NEEDED
COMMUNITY
Start: 2021-10-18 | End: 2022-02-07

## 2021-11-01 RX ORDER — FUROSEMIDE 20 MG/1
20 TABLET ORAL AS NEEDED
COMMUNITY
Start: 2021-10-18 | End: 2021-11-29

## 2021-11-01 RX ORDER — INSULIN GLARGINE 100 [IU]/ML
INJECTION, SOLUTION SUBCUTANEOUS
COMMUNITY
Start: 2021-11-01 | End: 2022-12-22

## 2021-11-01 RX ORDER — ATORVASTATIN CALCIUM 40 MG/1
40 TABLET, FILM COATED ORAL
COMMUNITY
Start: 2021-10-04

## 2021-11-01 RX ORDER — METOPROLOL SUCCINATE 25 MG/1
25 TABLET, EXTENDED RELEASE ORAL DAILY
COMMUNITY
Start: 2021-08-11 | End: 2021-11-29

## 2021-11-01 RX ORDER — PANTOPRAZOLE SODIUM 40 MG/1
40 TABLET, DELAYED RELEASE ORAL
COMMUNITY
Start: 2021-10-04

## 2021-11-01 RX ORDER — LISINOPRIL 2.5 MG/1
2.5 TABLET ORAL DAILY
COMMUNITY
Start: 2021-08-10 | End: 2022-05-20

## 2021-11-01 NOTE — PROGRESS NOTES
Subjective     Maeve Yates is a 61 y.o. female who presents to day for Congestive Heart Failure (presents for cardiac eval (wearing Life Vest)), Shortness of Breath, Chest Pain, and Palpitations.    CHIEF COMPLIANT  Chief Complaint   Patient presents with   • Congestive Heart Failure     presents for cardiac eval (wearing Life Vest)   • Shortness of Breath   • Chest Pain   • Palpitations       Active Problems:  Problem List Items Addressed This Visit        Cardiac and Vasculature    Essential (primary) hypertension    Relevant Medications    lisinopril (PRINIVIL,ZESTRIL) 2.5 MG tablet    metoprolol succinate XL (TOPROL-XL) 25 MG 24 hr tablet    furosemide (LASIX) 20 MG tablet    spironolactone (ALDACTONE) 25 MG tablet    Other Relevant Orders    Adult Transthoracic Echo Limited W/ Cont if Necessary Per Protocol    Comprehensive Metabolic Panel    CAD (coronary artery disease)    Relevant Medications    metoprolol succinate XL (TOPROL-XL) 25 MG 24 hr tablet    Other Relevant Orders    Adult Transthoracic Echo Limited W/ Cont if Necessary Per Protocol    Comprehensive Metabolic Panel    Cardiomyopathy (HCC)    Relevant Medications    metoprolol succinate XL (TOPROL-XL) 25 MG 24 hr tablet    spironolactone (ALDACTONE) 25 MG tablet    Other Relevant Orders    Adult Transthoracic Echo Limited W/ Cont if Necessary Per Protocol    Comprehensive Metabolic Panel       Pulmonary and Pneumonias    Shortness of breath - Primary    Relevant Orders    Ambulatory Referral to Pulmonology    Adult Transthoracic Echo Limited W/ Cont if Necessary Per Protocol    Comprehensive Metabolic Panel      Other Visit Diagnoses     HFrEF (heart failure with reduced ejection fraction) (HCC)        Relevant Medications    metoprolol succinate XL (TOPROL-XL) 25 MG 24 hr tablet    spironolactone (ALDACTONE) 25 MG tablet    Other Relevant Orders    Adult Transthoracic Echo Limited W/ Cont if Necessary Per Protocol    Comprehensive Metabolic  Panel      PROBLEM LIST:  1. Coronary artery disease. Catheterization in 2009 at [facility] demonstrated 30%  circumflex disease with 50% posterolateral branch.   1.1 follow-up catheterization in September 2016 because of non-ST elevation myocardial infarction demonstrated a total occlusion of the right coronary artery with 90% high-grade LAD disease and 50-70% circumflex disease. Stenting of the LAD was performed. Medical management recommended and percutaneous intervention in the circumflex if patient fails medical therapy.  1.2 follow-up catheterization March 2017 with stenting of the circumflex. Patent stent to the LAD with chronic total occlusion of the right coronary artery. Medical management recommended  1.3 left heart catheterization 6/20: Left main normal, LAD 30% in-stent stenosis with left-to-right collaterals, circumflex patent stent, left to right collaterals, % occluded, EF 50 to 55%  1.4 LAD 95% in-stent stenosis with angioplasty that left minimal residual stenosis,  of the RCA that was unable to be crossed, 90% septal , EF less than 30%  2. Ischemic cardiomyopathy  3. Hypertension.   4. Chronic tobacco habituation.   5. Dyslipidemia.   6.  Angioplasty to left lower extremity per Dr. White 8/2/17    HPI  HPI  Ms. chaves is a 61-year-old female patient who is being followed up today for ischemic cardiomyopathy and coronary artery disease.  Patient was recently seen at the [facility] and underwent left heart catheterization that showed 95% stenosis previously placed LAD stent in which she did go under balloon angioplasty at left minimal residual stenosis.  They did attempt to cross the  but was unsuccessful.  She is also found to have a 90% stenosis in a large septal  and ejection fraction less than 60%.  She is currently wearing a LifeVest for transition until we can repeat an echocardiogram at 90 days to see if she qualifies for an implantable  cardioverter defibrillator.  She does report some intermittent chest pain that occurs in her chest and which she describes as a pressure and has some associated shortness of breath.  Does seem to be worse with activity but also occurs at rest.  She also has chronic shortness of breath that is unchanged and nonprogressive with some associated wheezing.  She is on O2 at 2 L per nasal cannula daily.  She also has some intermittent palpitations in which she describes as a racing type sensation in her chest.  She does have a history of syncope approximately 1 month ago that led to her previous left heart catheterization where she was changing positions and had a syncopal episode.  She is evaluated at [facility] and then transferred to the [facility] in which she did undergo left heart catheterization as described above.  She does have chronic fatigue where she is tired all the time.  With her ischemic cardiomyopathy she is on lisinopril 2.5 mg daily as well as metoprolol succinate.  Due to patient's low normal blood pressure it is not felt like patient would tolerate Entresto.  She is also on dual antiplatelet therapy of Plavix and aspirin.  She is on antianginal therapy of isosorbide and Ranexa.  She does experience dizziness with changing positions too quickly.  As well as generalized weakness.  She denies any lower extremity edema, PND, orthopnea, or strokelike symptoms.    PRIOR MEDS  Current Outpatient Medications on File Prior to Visit   Medication Sig Dispense Refill   • albuterol (PROVENTIL HFA;VENTOLIN HFA) 108 (90 BASE) MCG/ACT inhaler Inhale 2 puffs every 4 (four) hours as needed for wheezing or shortness of air.     • albuterol (PROVENTIL) (2.5 MG/3ML) 0.083% nebulizer solution Take 2.5 mg by nebulization Every 4 (Four) Hours As Needed for wheezing.     • aspirin 81 MG EC tablet TAKE ONE TABLET BY MOUTH DAILY 30 tablet 11   • citalopram (CeleXA) 20 MG tablet Take 20 mg by mouth Daily.  1   •  clopidogrel (PLAVIX) 75 MG tablet TAKE 1 TABLET BY MOUTH DAILY. 90 tablet 3   • fenofibrate (TRICOR) 145 MG tablet Take 145 mg by mouth Daily.  5   • furosemide (LASIX) 20 MG tablet Take 20 mg by mouth As Needed.     • gabapentin (NEURONTIN) 800 MG tablet Take 800 mg by mouth 3 (Three) Times a Day.     • isosorbide mononitrate (IMDUR) 30 MG 24 hr tablet Take 1 tablet by mouth Daily. 30 tablet 11   • Lantus SoloStar 100 UNIT/ML injection pen      • linaclotide (LINZESS) 145 MCG capsule capsule Take 145 mcg by mouth Every Morning Before Breakfast.     • lisinopril (PRINIVIL,ZESTRIL) 2.5 MG tablet Take 2.5 mg by mouth Daily.     • metFORMIN (GLUCOPHAGE) 1000 MG tablet Take 1,000 mg by mouth 2 (two) times a day with meals.     • metoprolol succinate XL (TOPROL-XL) 25 MG 24 hr tablet Take 25 mg by mouth Daily.     • nitroglycerin (Nitrostat) 0.4 MG SL tablet Place 1 tablet under the tongue Every 5 (Five) Minutes As Needed for Chest Pain. 25 tablet 3   • NovoLOG FlexPen 100 UNIT/ML solution pen-injector sc pen      • pantoprazole (PROTONIX) 40 MG EC tablet Take 40 mg by mouth Every Morning Before Breakfast.     • potassium chloride 10 MEQ CR tablet Take 10 mEq by mouth As Needed. With Lasix     • ranolazine (RANEXA) 1000 MG 12 hr tablet TAKE ONE TABLET BY MOUTH EVERY 12 HOURS 60 tablet 11   • atorvastatin (LIPITOR) 80 MG tablet Take 80 mg by mouth every night at bedtime.       No current facility-administered medications on file prior to visit.       ALLERGIES  Patient has no known allergies.    HISTORY  Past Medical History:   Diagnosis Date   • CAD (coronary artery disease)     Catheterization in 2009 at [facility] demonstrated 30% circumflex disease with 50% posterolateral branch.    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    • Peripheral vascular disease (HCC)    • Shortness of breath    • Tobacco use     smokes 2 packs a week   • Wears glasses        Social History     Socioeconomic History   • Marital status:    Tobacco Use   • Smoking status: Former Smoker     Packs/day: 0.50     Years: 40.00     Pack years: 20.00     Types: Cigarettes     Quit date: 2021     Years since quittin.2   • Smokeless tobacco: Never Used   • Tobacco comment: Has smoked up to 1 1/2 ppd; currently smoking 2 packs per week, has cut down and is trying to quit   Substance and Sexual Activity   • Alcohol use: No   • Drug use: No   • Sexual activity: Defer       Family History   Problem Relation Age of Onset   • Hypertension Mother    • Hyperlipidemia Mother    • Skin cancer Mother    • No Known Problems Father        Review of Systems   Constitutional: Positive for fatigue. Negative for chills, diaphoresis and fever.   HENT: Negative.    Eyes: Positive for visual disturbance (constant blurry vision).   Respiratory: Positive for apnea (02 at 2 L), cough, shortness of breath (02 at 2 L daily) and wheezing. Negative for chest tightness.         Scheduled to see Dr Araujo    Cardiovascular: Positive for chest pain (pressure) and palpitations (occas racing). Negative for leg swelling.   Gastrointestinal: Positive for constipation. Negative for abdominal pain, blood in stool, diarrhea, nausea and vomiting.   Endocrine: Negative.    Genitourinary: Negative.  Negative for hematuria.   Musculoskeletal: Positive for arthralgias, back pain, myalgias (when taking statins) and neck pain.   Skin: Negative.    Allergic/Immunologic: Negative.    Neurological: Positive for dizziness (position change and quick movment), syncope (epidose last month with position change) and weakness. Negative for light-headedness, numbness and headaches.  "  Hematological: Does not bruise/bleed easily.   Psychiatric/Behavioral: Negative.  Negative for sleep disturbance (insomnia only).       Objective     VITALS: /71 (BP Location: Right arm, Patient Position: Sitting)   Pulse 77   Ht 157.5 cm (62\")   Wt 91.5 kg (201 lb 12.8 oz)   SpO2 98% Comment: 2L 02  BMI 36.91 kg/m²     LABS:   Lab Results (most recent)     None          IMAGING:   No Images in the past 120 days found..    EXAM:  Physical Exam  Vitals and nursing note reviewed.   Constitutional:       Appearance: She is well-developed.   HENT:      Head: Normocephalic.   Eyes:      Pupils: Pupils are equal, round, and reactive to light.   Neck:      Thyroid: No thyroid mass.      Vascular: No carotid bruit or JVD.      Trachea: Trachea and phonation normal.   Cardiovascular:      Rate and Rhythm: Normal rate and regular rhythm.      Pulses:           Radial pulses are 2+ on the right side and 2+ on the left side.        Posterior tibial pulses are 2+ on the right side and 2+ on the left side.      Heart sounds: Normal heart sounds. No murmur heard.  No friction rub. No gallop.    Pulmonary:      Effort: Pulmonary effort is normal. No respiratory distress.      Breath sounds: Decreased breath sounds present. No wheezing or rales.   Abdominal:      General: Bowel sounds are normal.      Palpations: Abdomen is soft.   Musculoskeletal:         General: Swelling present. Normal range of motion.      Cervical back: Neck supple.   Skin:     General: Skin is warm and dry.      Capillary Refill: Capillary refill takes less than 2 seconds.      Findings: No rash.   Neurological:      Mental Status: She is alert and oriented to person, place, and time.   Psychiatric:         Speech: Speech normal.         Behavior: Behavior normal.         Thought Content: Thought content normal.         Judgment: Judgment normal.         Procedure     ECG 12 Lead    Date/Time: 11/1/2021 1:33 PM  Performed by: Marshall Oquendo" ANNE  Authorized by: Marshall Oquendo APRN   Comparison: compared with previous ECG from 1/3/2020  Rhythm: sinus rhythm  Rate: normal  BPM: 78  Conduction: non-specific intraventricular conduction delay  QRS axis: normal  Other findings: non-specific ST-T wave changes  Comments:  ms  No acute changes               Assessment/Plan    Diagnosis Plan   1. Shortness of breath  Ambulatory Referral to Pulmonology    Adult Transthoracic Echo Limited W/ Cont if Necessary Per Protocol    Comprehensive Metabolic Panel   2. Ischemic cardiomyopathy  spironolactone (ALDACTONE) 25 MG tablet    Adult Transthoracic Echo Limited W/ Cont if Necessary Per Protocol    Comprehensive Metabolic Panel   3. Essential (primary) hypertension  Adult Transthoracic Echo Limited W/ Cont if Necessary Per Protocol    Comprehensive Metabolic Panel   4. Coronary artery disease involving native coronary artery of native heart with unstable angina pectoris ([facility])  Adult Transthoracic Echo Limited W/ Cont if Necessary Per Protocol    Comprehensive Metabolic Panel   5. HFrEF (heart failure with reduced ejection fraction) ([facility])  spironolactone (ALDACTONE) 25 MG tablet    Adult Transthoracic Echo Limited W/ Cont if Necessary Per Protocol    Comprehensive Metabolic Panel   1.  Patient has heart failure with reduced ejection fraction in which she is on a LifeVest.  She did recently go under left heart catheterization which she received percutaneous coronary artery intervention for an in-stent 95% stenosis of the LAD.  This left very residual stenosis.  She still  has a 90% septal  as well as a  of the RCA.  She is currently on a LifeVest.  She is on guideline driven medication therapy with lisinopril metoprolol succinate.  It is not felt as if she would tolerate transition from lisinopril to Entresto due to low normal blood pressure.  We will try to add spironolactone 12.5 mg daily to further treat her heart failure with reduced ejection  fraction.  2.  I would like to repeat an echocardiogram  90 days after her previous left heart catheterization for evaluation of her ejection fraction and if her ejection fraction continues to be decreased we will consider ICD placement.  3.  Patient does have coronary artery disease as mentioned above.  We will continue intensity statin therapy as well as dual antiplatelet therapy.  If she has symptoms despite her antianginal therapy with isosorbide and Ranexa will consider having patient recath for intervention to the septal  over attempt of opening the  of the RCA  4.  Patient's blood pressure is well controlled on current blood pressure medication regimen.  No medication changes are warranted at this time.  Patient advised to monitor blood pressure on a daily basis and report any persistent highs or lows.  Set goal blood pressure for patient at 130/80 or below.  5.  Informed of signs and symptoms of ACS and advised to seek emergent treatment for any new worsening symptoms.  Patient also advised sooner follow-up as needed.  Also advised to follow-up with family doctor as needed  This note is dictated utilizing voice recognition software.  Although this record has been proof read, transcriptional errors may still be present. If questions occur regarding the content of this record please do not hesitate to call our office.  I have reviewed and confirmed the accuracy of the ROS as documented by the MA/ARA/RN ANNE Parry    No follow-ups on file.    Diagnoses and all orders for this visit:    1. Shortness of breath (Primary)  -     Ambulatory Referral to Pulmonology  -     Cancel: Adult Transthoracic Echo Limited W/ Cont if Necessary Per Protocol; Future  -     Cancel: Comprehensive Metabolic Panel; Future  -     Adult Transthoracic Echo Limited W/ Cont if Necessary Per Protocol; Future  -     Comprehensive Metabolic Panel; Future    2. Ischemic cardiomyopathy  -     spironolactone (ALDACTONE)  25 MG tablet; Take 0.5 tablets by mouth Daily.  Dispense: 15 tablet; Refill: 11  -     Cancel: Adult Transthoracic Echo Limited W/ Cont if Necessary Per Protocol; Future  -     Cancel: Comprehensive Metabolic Panel; Future  -     Adult Transthoracic Echo Limited W/ Cont if Necessary Per Protocol; Future  -     Comprehensive Metabolic Panel; Future    3. Essential (primary) hypertension  -     Cancel: Adult Transthoracic Echo Limited W/ Cont if Necessary Per Protocol; Future  -     Cancel: Comprehensive Metabolic Panel; Future  -     Adult Transthoracic Echo Limited W/ Cont if Necessary Per Protocol; Future  -     Comprehensive Metabolic Panel; Future    4. Coronary artery disease involving native coronary artery of native heart with unstable angina pectoris (HCC)  -     Cancel: Adult Transthoracic Echo Limited W/ Cont if Necessary Per Protocol; Future  -     Cancel: Comprehensive Metabolic Panel; Future  -     Adult Transthoracic Echo Limited W/ Cont if Necessary Per Protocol; Future  -     Comprehensive Metabolic Panel; Future    5. HFrEF (heart failure with reduced ejection fraction) ([facility])  -     spironolactone (ALDACTONE) 25 MG tablet; Take 0.5 tablets by mouth Daily.  Dispense: 15 tablet; Refill: 11  -     Cancel: Adult Transthoracic Echo Limited W/ Cont if Necessary Per Protocol; Future  -     Cancel: Comprehensive Metabolic Panel; Future  -     Adult Transthoracic Echo Limited W/ Cont if Necessary Per Protocol; Future  -     Comprehensive Metabolic Panel; Future    Other orders  -     ECG 12 Lead                 MEDS ORDERED DURING VISIT:  New Medications Ordered This Visit   Medications   • spironolactone (ALDACTONE) 25 MG tablet     Sig: Take 0.5 tablets by mouth Daily.     Dispense:  15 tablet     Refill:  11           This document has been electronically signed by Marshall Oquendo Jr., APRN  November 5, 2021 00:47 EDT

## 2021-11-04 ENCOUNTER — OFFICE VISIT (OUTPATIENT)
Dept: PULMONOLOGY | Facility: CLINIC | Age: 61
End: 2021-11-04

## 2021-11-04 VITALS
DIASTOLIC BLOOD PRESSURE: 67 MMHG | HEART RATE: 83 BPM | WEIGHT: 205.8 LBS | HEIGHT: 62 IN | SYSTOLIC BLOOD PRESSURE: 114 MMHG | BODY MASS INDEX: 37.87 KG/M2 | OXYGEN SATURATION: 97 %

## 2021-11-04 DIAGNOSIS — Z28.21 COVID-19 VACCINATION DECLINED: ICD-10-CM

## 2021-11-04 DIAGNOSIS — J44.9 CHRONIC OBSTRUCTIVE PULMONARY DISEASE, UNSPECIFIED COPD TYPE (HCC): ICD-10-CM

## 2021-11-04 DIAGNOSIS — F17.201 TOBACCO ABUSE, IN REMISSION: ICD-10-CM

## 2021-11-04 DIAGNOSIS — R06.02 SHORTNESS OF BREATH: Primary | ICD-10-CM

## 2021-11-04 DIAGNOSIS — I25.5 ISCHEMIC CARDIOMYOPATHY: ICD-10-CM

## 2021-11-04 PROCEDURE — 99214 OFFICE O/P EST MOD 30 MIN: CPT | Performed by: NURSE PRACTITIONER

## 2021-11-04 NOTE — PROGRESS NOTES
"     New Pulmonary Patient Office Visit - [place]     Patient Name: Maeve Yates    Referring Physician: No ref. provider found    Chief Complaint:    Chief Complaint   Patient presents with   • Establish Care     New patient in office for shortness of breath       History of Present Illness: Maeve Yates is a 61 y.o. female who is here today to establish care with Pulmonary for COPD.  Pulmonary care was suggested to patient by cardiology.  She does have a significant cardiac history with systolic heart failure secondary to ischemic cardiomyopathy with ejection fraction of 20%.  She has undergone coronary stent placement recently, but does have a chronic total occlusion of the RCA, which was unable to be revascularized, though does have left-to-right collaterals.  It has been determined that patient is not a candidate for further coronary revascularization attempts, and is also not a candidate for advanced heart failure therapies. She is currently wearing a Life Vest and has a pending consult with EP to consider ICD placement. She is scheduled for a pending stress test and echocardiogram.  She reports compliance with use of Spiriva and Breo. She also reports compliance with her diuretic regimen. She reports attempting a PFT at  earlier this year but \"failed\" the test - it is unclear per patient report whether she completed the PFT.    Duration: COPD diagnosed 5-6 years ago, no known history of asthma  Severity: Severe dyspnea  Timing: Daily  Context: Mild exertion  Associated Symptoms: Intermittent chest pain  Modifying Factors: Worse with exertion, improves with rest and inhalers and supplemental oxygen  Supplemental Oxygen: 2L NC  Smoking: Up to 2.5 ppd since age 17, quit in August 2021  Cardiac History: CAD, HFrEF, PVD  Last Hospitalization: August 2021  Last Steroids: 2-3 months ago  Number of exacerbations per year: 1    Subjective      Review of Systems:   Review of Systems   Constitutional: Negative " for fever and unexpected weight change.   Respiratory: Positive for shortness of breath. Negative for wheezing.    Cardiovascular:        + Intermittent chest pain, though none currently   Skin: Negative for rash.   Neurological: Negative for syncope.        Past Medical History:   Past Medical History:   Diagnosis Date   • CAD (coronary artery disease)     Catheterization in  at [facility] demonstrated 30% circumflex disease with 50% posterolateral branch.    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    • Peripheral vascular disease (HCC)    • Shortness of breath    • Tobacco use     smokes 2 packs a week   • Wears glasses        Past Surgical History:   Past Surgical History:   Procedure Laterality Date   • AORTAGRAM N/A 8/3/2017    Procedure: AORTAGRAM WITH RUNOFFS AND STENT;  Surgeon: Murphy White MD;  Location: [place] [address];  Service:    • CARDIAC CATHETERIZATION     • CORONARY ANGIOPLASTY     • CORONARY STENT PLACEMENT      x2   • TUBAL ABDOMINAL LIGATION         Family History:   Family History   Problem Relation Age of Onset   • Hypertension Mother    • Hyperlipidemia Mother    • Skin cancer Mother    • No Known Problems Father        Social History:   Social History     Socioeconomic History   • Marital status:    Tobacco Use   • Smoking status: Former Smoker     Packs/day: 0.50     Years: 40.00     Pack years: 20.00     Types: Cigarettes     Quit date: 2021     Years since quittin.2   • Smokeless tobacco: Never Used   • Tobacco comment: Has smoked up to 1 1/2 ppd; currently smoking 2 packs per week, has cut down and is trying to quit   Substance and Sexual  Activity   • Alcohol use: No   • Drug use: No   • Sexual activity: Defer       Medications:     Current Outpatient Medications:   •  albuterol (PROVENTIL HFA;VENTOLIN HFA) 108 (90 BASE) MCG/ACT inhaler, Inhale 2 puffs every 4 (four) hours as needed for wheezing or shortness of air., Disp: , Rfl:   •  aspirin 81 MG EC tablet, TAKE ONE TABLET BY MOUTH DAILY, Disp: 30 tablet, Rfl: 11  •  atorvastatin (LIPITOR) 80 MG tablet, Take 80 mg by mouth every night at bedtime., Disp: , Rfl:   •  citalopram (CeleXA) 20 MG tablet, Take 20 mg by mouth Daily., Disp: , Rfl: 1  •  clopidogrel (PLAVIX) 75 MG tablet, TAKE 1 TABLET BY MOUTH DAILY., Disp: 90 tablet, Rfl: 3  •  fenofibrate (TRICOR) 145 MG tablet, Take 145 mg by mouth Daily., Disp: , Rfl: 5  •  Fluticasone Furoate-Vilanterol (Breo Ellipta) 100-25 MCG/INH inhaler, Inhale 1 puff Daily., Disp: , Rfl:   •  furosemide (LASIX) 20 MG tablet, Take 20 mg by mouth As Needed., Disp: , Rfl:   •  gabapentin (NEURONTIN) 800 MG tablet, Take 800 mg by mouth 3 (Three) Times a Day., Disp: , Rfl:   •  isosorbide mononitrate (IMDUR) 30 MG 24 hr tablet, Take 1 tablet by mouth Daily., Disp: 30 tablet, Rfl: 11  •  Lantus SoloStar 100 UNIT/ML injection pen, , Disp: , Rfl:   •  linaclotide (LINZESS) 145 MCG capsule capsule, Take 145 mcg by mouth Every Morning Before Breakfast., Disp: , Rfl:   •  lisinopril (PRINIVIL,ZESTRIL) 2.5 MG tablet, Take 2.5 mg by mouth Daily., Disp: , Rfl:   •  metFORMIN (GLUCOPHAGE) 1000 MG tablet, Take 1,000 mg by mouth 2 (two) times a day with meals., Disp: , Rfl:   •  metoprolol succinate XL (TOPROL-XL) 25 MG 24 hr tablet, Take 25 mg by mouth Daily., Disp: , Rfl:   •  nitroglycerin (Nitrostat) 0.4 MG SL tablet, Place 1 tablet under the tongue Every 5 (Five) Minutes As Needed for Chest Pain., Disp: 25 tablet, Rfl: 3  •  NovoLOG FlexPen 100 UNIT/ML solution pen-injector sc pen, , Disp: , Rfl:   •  pantoprazole (PROTONIX) 40 MG EC tablet, Take 40 mg by mouth Every Morning  "Before Breakfast., Disp: , Rfl:   •  ranolazine (RANEXA) 1000 MG 12 hr tablet, TAKE ONE TABLET BY MOUTH EVERY 12 HOURS, Disp: 60 tablet, Rfl: 11  •  spironolactone (ALDACTONE) 25 MG tablet, Take 0.5 tablets by mouth Daily., Disp: 15 tablet, Rfl: 11  •  tiotropium bromide monohydrate (SPIRIVA RESPIMAT) 2.5 MCG/ACT aerosol solution inhaler, Inhale 2 puffs Daily., Disp: , Rfl:   •  albuterol (PROVENTIL) (2.5 MG/3ML) 0.083% nebulizer solution, Take 2.5 mg by nebulization Every 4 (Four) Hours As Needed for wheezing., Disp: , Rfl:   •  potassium chloride 10 MEQ CR tablet, Take 10 mEq by mouth As Needed. With Lasix, Disp: , Rfl:     Allergies:   No Known Allergies    Objective     Physical Exam:  Vital Signs:   Vitals:    11/04/21 0959   BP: 114/67   BP Location: Left arm   Patient Position: Sitting   Cuff Size: Adult   Pulse: 83   SpO2: 97%  Comment: on room air   Weight: 93.4 kg (205 lb 12.8 oz)   Height: 157.5 cm (62.01\")   PainSc: 0-No pain     Body mass index is 37.63 kg/m².    Physical Exam  Constitutional:       General: She is not in acute distress.     Appearance: She is obese. She is not toxic-appearing.      Comments: +wearing supplemental O2   Cardiovascular:      Rate and Rhythm: Normal rate and regular rhythm.      Heart sounds: No murmur heard.      Pulmonary:      Effort: Pulmonary effort is normal.      Comments: Diminished throughout, but clear to auscultation bilaterally  Abdominal:      General: There is no distension.   Musculoskeletal:         General: No swelling.      Cervical back: Neck supple.   Skin:     General: Skin is warm and dry.   Neurological:      General: No focal deficit present.      Mental Status: She is alert and oriented to person, place, and time.   Psychiatric:         Mood and Affect: Mood normal.         Behavior: Behavior normal.         Results Review:     [facility]  Outside Information      CBC W/O Differential [[id number]] (Order [id number])  Order  Date: 8/10/2021 " Department: Generic External Data Department Ordering/Authorizing: Provider, Generic External Data       Reprint Order Requisition    CBC W/O Differential (Order #[id number]) on 8/10/21             (suggestion)  Information displayed in this report will not trend and will not trigger automated decision support.        Contains abnormal data CBC W/O Differential  Order: [id number]  Component   Ref Range & Units 2 mo ago   WBC Count   3.70 - 10.30 10*3/uL 9.73    RBC Count   3.90 - 5.20 10*6/uL 3.66 Low     HGB   11.2 - 15.7 g/dL 10.9 Low     HCT   34.0 - 45.0 % 34.3    Platelet Count   155 - 369 10*3/uL 210    MCV   79 - 98 fL 94    MCH   26.0 - 32.0 pg 29.8    MCHC   30.7 - 35.5 g/dL 31.8    RDW   11.5 - 14.5 % 15.1 High     MPV   8.8 - 12.5 fL 13.0 High     nRBC   <=0.0 per 100 WBCs 0.0    Resulting Agency [facility]   Specimen Collected: 08/10/21 04:05 Last Resulted: 08/10/21 04:28   Received From: [facility]  Result Received: 11/01/21 13:01    View Encounter       Received Information      Lab Component SmartPhrase Guide    CBC W/O Differential (Order #[id number]) on 8/10/21     Assessment / Plan      Assessment/Plan:    Diagnoses and all orders for this visit:      1. Shortness of breath  Multifactorial secondary to ischemic cardiomyopathy, COPD and obesity.  Patient continues on supplemental oxygen.    2. Chronic obstructive pulmonary disease, unspecified COPD type (HCC) (Primary)  Check A1AT genotype today. Will request most recent PFT results from . If no result is available, will order repeat PFTs to be performed at [facility] or can obtain office spirometry at next clinic visit. Patient should continue on triple therapy with Breo and Spiriva. No indication to add other therapies as she does not have frequent exacerbations.    3. Tobacco abuse, in remission  Patient with higher smoking history, now in early remission.  Ongoing cessation advised.  Patient reports that she is committed to  maintaining cessation.  Will request most recent chest imaging reports from [facility].  Patient does qualify to have annual lung cancer screening scans, though will be higher risk for biopsy or treatment if malignancy were identified.  This was discussed with patient.  If no current CT scans available, will have further shared decision-making discussion at her next clinic visit.    4. Ischemic cardiomyopathy  Patient with systolic heart failure.  Patient is not a candidate for further coronary revascularization and continues on medical management.  Currently wearing a LifeVest and is planning to be evaluated for ICD placement.  Patient advised to maintain compliance with her cardiac medications and to maintain follow-ups and testing as scheduled by cardiology.    5. COVID-19 vaccination declined  Covid vaccination was recommended to the patient, though she declines despite counseling.  Patient is a high mortality risk if she were to become infected. She voiced understanding.    Follow Up:   Return in about 3 months (around 2/4/2022) for Recheck.  The patient was counseled on diagnostic results, risks and benefits of treatment options, risk factor modifications and the importance of treatment compliance. The patient was advised to contact the clinic with concerns or worsening symptoms.     ANNE Deleon  Pulmonary Medicine [place]

## 2021-11-05 NOTE — PATIENT INSTRUCTIONS
Acute Coronary Syndrome  Acute coronary syndrome (ACS) is a serious problem in which there is suddenly not enough blood and oxygen reaching the heart. ACS can result in chest pain or a heart attack.  This condition is a medical emergency. If you have any symptoms of this condition, get help right away.  What are the causes?  This condition may be caused by:  · A buildup of fat and cholesterol inside the arteries (atherosclerosis). This is the most common cause. The buildup (plaque) can cause blood vessels in the heart (coronary arteries) to become narrow or blocked, which reduces blood flow to the heart. Plaque can also break off and lead to a clot, which can block an artery and cause a heart attack or stroke.  · Sudden tightening of the muscles around the coronary arteries (coronary spasm).  · Tearing of a coronary artery (spontaneous coronary artery dissection).  · Very low blood pressure (hypotension).  · An abnormal heartbeat (arrhythmia).  · Other medical conditions that cause a decrease of oxygen to the heart, such as anemiaorrespiratory failure.  · Using cocaine or methamphetamine.  What increases the risk?  The following factors may make you more likely to develop this condition:  · Age. The risk for ACS increases as you get older.  · History of chest pain, heart attack, peripheral artery disease, or stroke.  · Having taken chemotherapy or immune-suppressing medicines.  · Being male.  · Family history of chest pain, heart disease, or stroke.  · Smoking.  · Not exercising enough.  · Being overweight.  · High cholesterol.  · High blood pressure (hypertension).  · Diabetes.  · Excessive alcohol use.  What are the signs or symptoms?  Common symptoms of this condition include:  · Chest pain. The pain may last a long time, or it may stop and come back (recur). It may feel like:  ? Crushing or squeezing.  ? Tightness, pressure, fullness, or heaviness.  · Arm, neck, jaw, or back pain.  · Heartburn or  indigestion.  · Shortness of breath.  · Nausea.  · Sudden cold sweats.  · Light-headedness.  · Dizziness or passing out.  · Tiredness (fatigue).  Sometimes there are no symptoms.  How is this diagnosed?  This condition may be diagnosed based on:  · Your medical history and symptoms.  · Imaging tests, such as:  ? An electrocardiogram (ECG). This measures the heart's electrical activity.  ? X-rays.  ? CT scan.  ? A coronary angiogram. For this test, dye is injected into the heart arteries and then X-rays are taken.  ? Myocardial perfusion imaging. This test shows how well blood flows through your heart muscle.  · Blood tests. These may be repeated at certain time intervals.  · Exercise stress testing.  · Echocardiogram. This is a test that uses sound waves to produce detailed images of the heart.  How is this treated?  Treatment for this condition may include:  · Oxygen therapy.  · Medicines, such as:  ? Antiplatelet medicines and blood-thinning medicines, such as aspirin. These help prevent blood clots.  ? Medicine that dissolves any blood clots (fibrinolytic therapy).  ? Blood pressure medicines.  ? Nitroglycerin. This helps widen blood vessels to improve blood flow.  ? Pain medicine.  ? Cholesterol-lowering medicine.  · Surgery, such as:  ? Coronary angioplasty with stent placement. This involves placing a small piece of metal that looks like mesh or a spring into a narrow coronary artery. This widens the artery and keeps it open.  ? Coronary artery bypass surgery. This involves taking a section of a blood vessel from a different part of your body and placing it on the blocked coronary artery to allow blood to flow around the blockage.  · Cardiac rehabilitation. This is a program that includes exercise training, education, and counseling to help you recover.  Follow these instructions at home:  Eating and drinking  · Eat a heart-healthy diet that includes whole grains, fruits and vegetables, lean proteins, and  low-fat or nonfat dairy products.  · Limit how much salt (sodium) you eat as told by your health care provider. Follow instructions from your health care provider about any other eating or drinking restrictions, such as limiting foods that are high in fat and processed sugars.  · Use healthy cooking methods such as roasting, grilling, broiling, baking, poaching, steaming, or stir-frying.  · Work with a dietitian to follow a heart-healthy eating plan.  Medicines  · Take over-the-counter and prescription medicines only as told by your health care provider.  · Do not take these medicines unless your health care provider approves:  ? Vitamin supplements that contain vitamin A or vitamin E.  ? NSAIDs, such as ibuprofen, naproxen, or celecoxib.  ? Hormone replacement therapy that contains estrogen.  · If you are taking blood thinners:  ? Talk with your health care provider before you take any medicines that contain aspirin or NSAIDs. These medicines increase your risk for dangerous bleeding.  ? Take your medicine exactly as told, at the same time every day.  ? Avoid activities that could cause injury or bruising, and follow instructions about how to prevent falls.  ? Wear a medical alert bracelet, and carry a card that lists what medicines you take.  Activity  · Follow your cardiac rehabilitation program. Do exercises as told by your physical therapist.  · Ask your health care provider what activities and exercises are safe for you. Follow his or her instructions about lifting, driving, or climbing stairs.  Lifestyle  · Do not use any products that contain nicotine or tobacco, such as cigarettes, e-cigarettes, and chewing tobacco. If you need help quitting, ask your health care provider.  · Do not drink alcohol if your health care provider tells you not to drink.  · If you drink alcohol:  ? Limit how much you have to 0-1 drink a day.  ? Be aware of how much alcohol is in your drink. In the [place] one drink equals one 12 oz  bottle of beer (355 mL), one 5 oz glass of wine (148 mL), or one 1½ oz glass of hard liquor (44 mL).  · Maintain a healthy weight. If you need to lose weight, work with your health care provider to do so safely.  General instructions  · Tell all the health care providers who provide care for you about your heart condition, including your dentist. This may affect the medicines or treatment you receive.  · Manage any other health conditions you have, such as hypertension or diabetes. These conditions affect your heart.  · Pay attention to your mental health. You may be at higher risk for depression.  ? Find ways to manage stress.  ? Talk to your health care provider about depression screening and treatment.  · Keep your vaccinations up to date.  ? Get the flu shot (influenza vaccine) every year.  ? Get the pneumococcal vaccine if you are age 65 or older.  · If directed, monitor your blood pressure at home.  · Keep all follow-up visits as told by your health care provider. This is important.  Contact a health care provider if you:  · Feel overwhelmed or sad.  · Have trouble doing your daily activities.  Get help right away if you:  · Have pain in your chest, neck, arm, jaw, stomach, or back that recurs, and:  ? It lasts for more than a few minutes.  ? It is not relieved by taking the medicineyour health care provider prescribed.  · Have unexplained:  ? Heavy sweating.  ? Heartburn or indigestion.  ? Nausea or vomiting.  ? Shortness of breath.  ? Difficulty breathing.  ? Fatigue.  ? Nervousness or anxiety.  ? Weakness.  ? Diarrhea.  ? Dark stools or blood in your stool.  · Have sudden light-headedness or dizziness.  · Have blood pressure that is higher than 180/120.  · Faint.  · Have thoughts about hurting yourself.  These symptoms may represent a serious problem that is an emergency. Do not wait to see if the symptoms will go away. Get medical help right away. Call your local emergency services (911 in the [place] Do  not drive yourself to the hospital.   Summary  · Acute coronary syndrome (ACS) is when there is not enough blood and oxygen being supplied to the heart. ACS can result in chest pain or a heart attack.  · Acute coronary syndrome is a medical emergency. If you have any symptoms of this condition, get help right away.  · Treatment includes medicines and procedures to open the blocked arteries and restore blood flow.  This information is not intended to replace advice given to you by your health care provider. Make sure you discuss any questions you have with your health care provider.  Document Revised: 05/20/2020 Document Reviewed: 12/30/2019  [employer] Patient Education © 2021 [employer]      Hypertension, Adult  Hypertension is another name for high blood pressure. High blood pressure forces your heart to work harder to pump blood. This can cause problems over time.  There are two numbers in a blood pressure reading. There is a top number (systolic) over a bottom number (diastolic). It is best to have a blood pressure that is below 120/80. Healthy choices can help lower your blood pressure, or you may need medicine to help lower it.  What are the causes?  The cause of this condition is not known. Some conditions may be related to high blood pressure.  What increases the risk?  · Smoking.  · Having type 2 diabetes mellitus, high cholesterol, or both.  · Not getting enough exercise or physical activity.  · Being overweight.  · Having too much fat, sugar, calories, or salt (sodium) in your diet.  · Drinking too much alcohol.  · Having long-term (chronic) kidney disease.  · Having a family history of high blood pressure.  · Age. Risk increases with age.  · Race. You may be at higher risk if you are .  · Gender. Men are at higher risk than women before age 45. After age 65, women are at higher risk than men.  · Having obstructive sleep apnea.  · Stress.  What are the signs or symptoms?  · High blood  pressure may not cause symptoms. Very high blood pressure (hypertensive crisis) may cause:  ? Headache.  ? Feelings of worry or nervousness (anxiety).  ? Shortness of breath.  ? Nosebleed.  ? A feeling of being sick to your stomach (nausea).  ? Throwing up (vomiting).  ? Changes in how you see.  ? Very bad chest pain.  ? Seizures.  How is this treated?  · This condition is treated by making healthy lifestyle changes, such as:  ? Eating healthy foods.  ? Exercising more.  ? Drinking less alcohol.  · Your health care provider may prescribe medicine if lifestyle changes are not enough to get your blood pressure under control, and if:  ? Your top number is above 130.  ? Your bottom number is above 80.  · Your personal target blood pressure may vary.  Follow these instructions at home:  Eating and drinking    · If told, follow the DASH eating plan. To follow this plan:  ? Fill one half of your plate at each meal with fruits and vegetables.  ? Fill one fourth of your plate at each meal with whole grains. Whole grains include whole-wheat pasta, brown rice, and whole-grain bread.  ? Eat or drink low-fat dairy products, such as skim milk or low-fat yogurt.  ? Fill one fourth of your plate at each meal with low-fat (lean) proteins. Low-fat proteins include fish, chicken without skin, eggs, beans, and tofu.  ? Avoid fatty meat, cured and processed meat, or chicken with skin.  ? Avoid pre-made or processed food.  · Eat less than 1,500 mg of salt each day.  · Do not drink alcohol if:  ? Your doctor tells you not to drink.  ? You are pregnant, may be pregnant, or are planning to become pregnant.  · If you drink alcohol:  ? Limit how much you use to:  § 0-1 drink a day for women.  § 0-2 drinks a day for men.  ? Be aware of how much alcohol is in your drink. In the [place] one drink equals one 12 oz bottle of beer (355 mL), one 5 oz glass of wine (148 mL), or one 1½ oz glass of hard liquor (44 mL).    Lifestyle    · Work with your  doctor to stay at a healthy weight or to lose weight. Ask your doctor what the best weight is for you.  · Get at least 30 minutes of exercise most days of the week. This may include walking, swimming, or biking.  · Get at least 30 minutes of exercise that strengthens your muscles (resistance exercise) at least 3 days a week. This may include lifting weights or doing Pilates.  · Do not use any products that contain nicotine or tobacco, such as cigarettes, e-cigarettes, and chewing tobacco. If you need help quitting, ask your doctor.  · Check your blood pressure at home as told by your doctor.  · Keep all follow-up visits as told by your doctor. This is important.    Medicines  · Take over-the-counter and prescription medicines only as told by your doctor. Follow directions carefully.  · Do not skip doses of blood pressure medicine. The medicine does not work as well if you skip doses. Skipping doses also puts you at risk for problems.  · Ask your doctor about side effects or reactions to medicines that you should watch for.  Contact a doctor if you:  · Think you are having a reaction to the medicine you are taking.  · Have headaches that keep coming back (recurring).  · Feel dizzy.  · Have swelling in your ankles.  · Have trouble with your vision.  Get help right away if you:  · Get a very bad headache.  · Start to feel mixed up (confused).  · Feel weak or numb.  · Feel faint.  · Have very bad pain in your:  ? Chest.  ? Belly (abdomen).  · Throw up more than once.  · Have trouble breathing.  Summary  · Hypertension is another name for high blood pressure.  · High blood pressure forces your heart to work harder to pump blood.  · For most people, a normal blood pressure is less than 120/80.  · Making healthy choices can help lower blood pressure. If your blood pressure does not get lower with healthy choices, you may need to take medicine.  This information is not intended to replace advice given to you by your health  care provider. Make sure you discuss any questions you have with your health care provider.  Document Revised: 08/28/2019 Document Reviewed: 08/28/2019  [employer] Patient Education © 2021 [employer]      Heart Failure, Diagnosis    Heart failure means that your heart is not able to pump blood in the right way. This makes it hard for your body to work well. Heart failure is usually a long-term (chronic) condition. You must take good care of yourself and follow your treatment plan from your doctor.  What are the causes?  This condition may be caused by:  · High blood pressure.  · Build up of cholesterol and fat in the arteries.  · Heart attack. This injures the heart muscle.  · Heart valves that do not open and close properly.  · Damage of the heart muscle. This is also called cardiomyopathy.  · Lung disease.  · Abnormal heart rhythms.  What increases the risk?  The risk of heart failure goes up as a person ages. This condition is also more likely to develop in people who:  · Are overweight.  · Are male.  · Smoke or chew tobacco.  · Abuse alcohol or illegal drugs.  · Have taken medicines that can damage the heart.  · Have diabetes.  · Have abnormal heart rhythms.  · Have thyroid problems.  · Have low blood counts (anemia).  What are the signs or symptoms?  Symptoms of this condition include:  · Shortness of breath.  · Coughing.  · Swelling of the feet, ankles, legs, or belly.  · Losing weight for no reason.  · Trouble breathing.  · Waking from sleep because of the need to sit up and get more air.  · Rapid heartbeat.  · Being very tired.  · Feeling dizzy, or feeling like you may pass out (faint).  · Having no desire to eat.  · Feeling like you may vomit (nauseous).  · Peeing (urinating) more at night.  · Feeling confused.  How is this treated?         This condition may be treated with:  · Medicines. These can be given to treat blood pressure and to make the heart muscles stronger.  · Changes in your daily life.  These may include eating a healthy diet, staying at a healthy body weight, quitting tobacco and illegal drug use, or doing exercises.  · Surgery. Surgery can be done to open blocked valves, or to put devices in the heart, such as pacemakers.  · A donor heart (heart transplant). You will receive a healthy heart from a donor.  Follow these instructions at home:  · Treat other conditions as told by your doctor. These may include high blood pressure, diabetes, thyroid disease, or abnormal heart rhythms.  · Learn as much as you can about heart failure.  · Get support as you need it.  · Keep all follow-up visits as told by your doctor. This is important.  Summary  · Heart failure means that your heart is not able to pump blood in the right way.  · This condition is caused by high blood pressure, heart attack, or damage of the heart muscle.  · Symptoms of this condition include shortness of breath and swelling of the feet, ankles, legs, or belly. You may also feel very tired or feel like you may vomit.  · You may be treated with medicines, surgery, or changes in your daily life.  · Treat other health conditions as told by your doctor.  This information is not intended to replace advice given to you by your health care provider. Make sure you discuss any questions you have with your health care provider.  Document Revised: 03/06/2020 Document Reviewed: 03/06/2020  [employer] Patient Education © 2021 [employer]

## 2021-11-29 ENCOUNTER — TELEPHONE (OUTPATIENT)
Dept: CARDIOLOGY | Facility: CLINIC | Age: 61
End: 2021-11-29

## 2021-11-29 DIAGNOSIS — R60.0 BILATERAL LEG EDEMA: Primary | ICD-10-CM

## 2021-11-29 RX ORDER — BUMETANIDE 2 MG/1
1 TABLET ORAL DAILY
Qty: 30 TABLET | Refills: 3 | Status: SHIPPED | OUTPATIENT
Start: 2021-11-29 | End: 2021-12-22

## 2021-11-29 NOTE — TELEPHONE ENCOUNTER
Pt is beginning to retain fluid. Was recently released from  for same issues. Wants to know what she should do. WT was 175 on Thanksgiving is 185 today.      Per JR increase Furosemide to 40 mg po qd until apt in a few days.     Pt care giver notified but she stated that most of her meds have been stopped at . Will call back with correct med list.    Pt Toprol XL, Aldactone, and Furosemide were all stopped at . Per Provider ( ) increase Bumex to 2 mg po qd for two days .     Notified pt of change and updated her med list.

## 2021-12-16 ENCOUNTER — APPOINTMENT (OUTPATIENT)
Dept: CARDIOLOGY | Facility: HOSPITAL | Age: 61
End: 2021-12-16

## 2021-12-17 ENCOUNTER — APPOINTMENT (OUTPATIENT)
Dept: CARDIOLOGY | Facility: HOSPITAL | Age: 61
End: 2021-12-17

## 2021-12-22 ENCOUNTER — TELEPHONE (OUTPATIENT)
Dept: CARDIOLOGY | Facility: CLINIC | Age: 61
End: 2021-12-22

## 2021-12-22 DIAGNOSIS — R60.0 BILATERAL LEG EDEMA: ICD-10-CM

## 2021-12-22 RX ORDER — BUMETANIDE 2 MG/1
2 TABLET ORAL DAILY
Qty: 90 TABLET | Refills: 3 | Status: SHIPPED | OUTPATIENT
Start: 2021-12-22

## 2021-12-22 NOTE — TELEPHONE ENCOUNTER
"Pts daughter called and states bumex is not working for mother. \"swellin in liliane feet, went from 197 to 201 in x1day, we want to see about increasing the bumex, she seen [facility] and he gave her lasix 4omg total injection states was told had fluid build up on lungs.\"  "

## 2021-12-22 NOTE — TELEPHONE ENCOUNTER
Per Marshall Oquendo, Bumex 2mg 1 tab daily. Pt is to call back with any concerns, and to monitor so we can continue to adjust accordingly. Updated chart. Daughter and pt verbally understands.

## 2021-12-23 ENCOUNTER — CLINICAL SUPPORT (OUTPATIENT)
Dept: CARDIOLOGY | Facility: CLINIC | Age: 61
End: 2021-12-23

## 2021-12-23 VITALS
HEIGHT: 62 IN | OXYGEN SATURATION: 100 % | HEART RATE: 101 BPM | DIASTOLIC BLOOD PRESSURE: 62 MMHG | SYSTOLIC BLOOD PRESSURE: 82 MMHG | BODY MASS INDEX: 38.09 KG/M2 | WEIGHT: 207 LBS

## 2021-12-23 DIAGNOSIS — I25.5 ISCHEMIC CARDIOMYOPATHY: ICD-10-CM

## 2021-12-23 DIAGNOSIS — I47.29 NSVT (NONSUSTAINED VENTRICULAR TACHYCARDIA) (HCC): ICD-10-CM

## 2021-12-23 DIAGNOSIS — Z51.89 VISIT FOR WOUND CHECK: Primary | ICD-10-CM

## 2021-12-23 PROCEDURE — 99024 POSTOP FOLLOW-UP VISIT: CPT | Performed by: PHYSICIAN ASSISTANT

## 2021-12-23 NOTE — PROGRESS NOTES
Maeve CHRISTEL Page  1960 12/23/2021   ?   Chief Complaint   Patient presents with   • Wound Check     Here for nurse visit s/p cardio-defib. implant   • Rapid Heart Rate     HX NSVT      ?   HPI:   ?   ? Patient here for wound check s/p [place] single chamber implantable card-defib. Per Dr. He due to HX of ICM and NSVT. Device implanted to left upper chest site clear with no s/s's of infection ( no redness, drainage, fould odor etc). Denies any fever or chills. Left arm sling in place. X 6 steri strips Dry with old dark drainage noted to strips. X 2 strips barely attached and other 4 also coming loose. Incision edges well approximated. Only scant amount of fading yellow ecchymosis noted to one end, barley noticeable. Miguel Lee, PAC to assess site. PH,LPN  ?     Current Outpatient Medications:   •  albuterol (PROVENTIL HFA;VENTOLIN HFA) 108 (90 BASE) MCG/ACT inhaler, Inhale 2 puffs every 4 (four) hours as needed for wheezing or shortness of air., Disp: , Rfl:   •  albuterol (PROVENTIL) (2.5 MG/3ML) 0.083% nebulizer solution, Take 2.5 mg by nebulization Every 4 (Four) Hours As Needed for wheezing., Disp: , Rfl:   •  aspirin 81 MG EC tablet, TAKE ONE TABLET BY MOUTH DAILY, Disp: 30 tablet, Rfl: 11  •  atorvastatin (LIPITOR) 80 MG tablet, Take 80 mg by mouth every night at bedtime., Disp: , Rfl:   •  bumetanide (BUMEX) 2 MG tablet, Take 1 tablet by mouth Daily., Disp: 90 tablet, Rfl: 3  •  citalopram (CeleXA) 20 MG tablet, Take 20 mg by mouth Daily., Disp: , Rfl: 1  •  clopidogrel (PLAVIX) 75 MG tablet, TAKE 1 TABLET BY MOUTH DAILY., Disp: 90 tablet, Rfl: 3  •  fenofibrate (TRICOR) 145 MG tablet, Take 145 mg by mouth Daily., Disp: , Rfl: 5  •  Fluticasone Furoate-Vilanterol (Breo Ellipta) 100-25 MCG/INH inhaler, Inhale 1 puff Daily., Disp: , Rfl:   •  gabapentin (NEURONTIN) 800 MG tablet, Take 800 mg by mouth 3 (Three) Times a Day., Disp: , Rfl:   •  isosorbide mononitrate (IMDUR) 30 MG 24 hr tablet, Take 1  tablet by mouth Daily., Disp: 30 tablet, Rfl: 11  •  Lantus SoloStar 100 UNIT/ML injection pen, , Disp: , Rfl:   •  linaclotide (LINZESS) 145 MCG capsule capsule, Take 145 mcg by mouth Every Morning Before Breakfast., Disp: , Rfl:   •  lisinopril (PRINIVIL,ZESTRIL) 2.5 MG tablet, Take 2.5 mg by mouth Daily., Disp: , Rfl:   •  metFORMIN (GLUCOPHAGE) 1000 MG tablet, Take 1,000 mg by mouth 2 (two) times a day with meals., Disp: , Rfl:   •  NovoLOG FlexPen 100 UNIT/ML solution pen-injector sc pen, , Disp: , Rfl:   •  pantoprazole (PROTONIX) 40 MG EC tablet, Take 40 mg by mouth Every Morning Before Breakfast., Disp: , Rfl:   •  potassium chloride 10 MEQ CR tablet, Take 10 mEq by mouth As Needed. With Lasix, Disp: , Rfl:   •  ranolazine (RANEXA) 1000 MG 12 hr tablet, TAKE ONE TABLET BY MOUTH EVERY 12 HOURS, Disp: 60 tablet, Rfl: 11  •  tiotropium bromide monohydrate (SPIRIVA RESPIMAT) 2.5 MCG/ACT aerosol solution inhaler, Inhale 2 puffs Daily., Disp: , Rfl:   •  nitroglycerin (Nitrostat) 0.4 MG SL tablet, Place 1 tablet under the tongue Every 5 (Five) Minutes As Needed for Chest Pain., Disp: 25 tablet, Rfl: 3   ?   ?   Patient has no known allergies.       Procedures     ?   Assessment/Plan    ?   ? 1. ICM       2. NSVT    Miguel Lee, PAC in to assess site. V/O to remove steri strips and schedule routine 1 mo. Post implant device interrogation. Steri strips removed without difficulty and incision edges remained intact, no dehiscence noted. Patient aware to call the office / Er immediately for any s/s's of infection, fever etc. Verbalized she understood. PH,LPN  ?

## 2022-01-21 ENCOUNTER — OFFICE VISIT (OUTPATIENT)
Dept: CARDIOLOGY | Facility: CLINIC | Age: 62
End: 2022-01-21

## 2022-01-21 ENCOUNTER — HOSPITAL ENCOUNTER (OUTPATIENT)
Dept: CARDIOLOGY | Facility: HOSPITAL | Age: 62
Discharge: HOME | End: 2022-01-21
Admitting: NURSE PRACTITIONER

## 2022-01-21 ENCOUNTER — APPOINTMENT (OUTPATIENT)
Dept: CARDIOLOGY | Facility: HOSPITAL | Age: 62
End: 2022-01-21

## 2022-01-21 DIAGNOSIS — R53.83 FATIGUE, UNSPECIFIED TYPE: ICD-10-CM

## 2022-01-21 DIAGNOSIS — R06.02 SHORTNESS OF BREATH: ICD-10-CM

## 2022-01-21 DIAGNOSIS — I50.20 SYSTOLIC CONGESTIVE HEART FAILURE, UNSPECIFIED HF CHRONICITY: ICD-10-CM

## 2022-01-21 DIAGNOSIS — I25.110 CORONARY ARTERY DISEASE INVOLVING NATIVE CORONARY ARTERY OF NATIVE HEART WITH UNSTABLE ANGINA PECTORIS: ICD-10-CM

## 2022-01-21 DIAGNOSIS — I25.5 ISCHEMIC CARDIOMYOPATHY: ICD-10-CM

## 2022-01-21 DIAGNOSIS — I42.9 CARDIOMYOPATHY, UNSPECIFIED TYPE: ICD-10-CM

## 2022-01-21 DIAGNOSIS — I50.20 HFREF (HEART FAILURE WITH REDUCED EJECTION FRACTION): ICD-10-CM

## 2022-01-21 DIAGNOSIS — I10 ESSENTIAL (PRIMARY) HYPERTENSION: ICD-10-CM

## 2022-01-21 PROCEDURE — 93356 MYOCRD STRAIN IMG SPCKL TRCK: CPT | Performed by: INTERNAL MEDICINE

## 2022-01-21 PROCEDURE — 93308 TTE F-UP OR LMTD: CPT

## 2022-01-21 PROCEDURE — 93282 PRGRMG EVAL IMPLANTABLE DFB: CPT | Performed by: INTERNAL MEDICINE

## 2022-01-21 PROCEDURE — 93308 TTE F-UP OR LMTD: CPT | Performed by: INTERNAL MEDICINE

## 2022-01-21 PROCEDURE — 93356 MYOCRD STRAIN IMG SPCKL TRCK: CPT

## 2022-01-23 LAB
BH CV ECHO MEAS - ACS: 1.7 CM
BH CV ECHO MEAS - AO ROOT AREA (BSA CORRECTED): 1.3
BH CV ECHO MEAS - AO ROOT AREA: 5.3 CM^2
BH CV ECHO MEAS - AO ROOT DIAM: 2.6 CM
BH CV ECHO MEAS - BSA(HAYCOCK): 2.1 M^2
BH CV ECHO MEAS - BSA: 1.9 M^2
BH CV ECHO MEAS - BZI_BMI: 37.9 KILOGRAMS/M^2
BH CV ECHO MEAS - BZI_METRIC_HEIGHT: 157.5 CM
BH CV ECHO MEAS - BZI_METRIC_WEIGHT: 93.9 KG
BH CV ECHO MEAS - EDV(CUBED): 136.6 ML
BH CV ECHO MEAS - EDV(MOD-SP2): 87.8 ML
BH CV ECHO MEAS - EDV(MOD-SP4): 73.1 ML
BH CV ECHO MEAS - EDV(TEICH): 126.6 ML
BH CV ECHO MEAS - EF(CUBED): 43.4 %
BH CV ECHO MEAS - EF(MOD-SP2): 45.2 %
BH CV ECHO MEAS - EF(MOD-SP4): 28.9 %
BH CV ECHO MEAS - EF(TEICH): 35.8 %
BH CV ECHO MEAS - EF_3D-VOL: 46 %
BH CV ECHO MEAS - ESV(CUBED): 77.3 ML
BH CV ECHO MEAS - ESV(MOD-SP2): 48.1 ML
BH CV ECHO MEAS - ESV(MOD-SP4): 52 ML
BH CV ECHO MEAS - ESV(TEICH): 81.3 ML
BH CV ECHO MEAS - FS: 17.3 %
BH CV ECHO MEAS - IVS/LVPW: 0.91
BH CV ECHO MEAS - IVSD: 1 CM
BH CV ECHO MEAS - LA DIMENSION: 5 CM
BH CV ECHO MEAS - LA/AO: 1.9
BH CV ECHO MEAS - LV DIASTOLIC VOL/BSA (35-75): 37.7 ML/M^2
BH CV ECHO MEAS - LV MASS(C)D: 212 GRAMS
BH CV ECHO MEAS - LV MASS(C)DI: 109.3 GRAMS/M^2
BH CV ECHO MEAS - LV SYSTOLIC VOL/BSA (12-30): 26.8 ML/M^2
BH CV ECHO MEAS - LVIDD: 5.2 CM
BH CV ECHO MEAS - LVIDS: 4.3 CM
BH CV ECHO MEAS - LVLD AP2: 6.5 CM
BH CV ECHO MEAS - LVLD AP4: 6.8 CM
BH CV ECHO MEAS - LVLS AP2: 6.2 CM
BH CV ECHO MEAS - LVLS AP4: 6.6 CM
BH CV ECHO MEAS - LVOT AREA (M): 3.1 CM^2
BH CV ECHO MEAS - LVOT AREA: 3.1 CM^2
BH CV ECHO MEAS - LVOT DIAM: 2 CM
BH CV ECHO MEAS - LVPWD: 1.1 CM
BH CV ECHO MEAS - RVDD: 3.3 CM
BH CV ECHO MEAS - SI(CUBED): 30.6 ML/M^2
BH CV ECHO MEAS - SI(MOD-SP2): 20.5 ML/M^2
BH CV ECHO MEAS - SI(MOD-SP4): 10.9 ML/M^2
BH CV ECHO MEAS - SI(TEICH): 23.4 ML/M^2
BH CV ECHO MEAS - SV(CUBED): 59.3 ML
BH CV ECHO MEAS - SV(MOD-SP2): 39.7 ML
BH CV ECHO MEAS - SV(MOD-SP4): 21.1 ML
BH CV ECHO MEAS - SV(TEICH): 45.4 ML
MAXIMAL PREDICTED HEART RATE: 160 BPM
STRESS TARGET HR: 136 BPM

## 2022-02-07 ENCOUNTER — OFFICE VISIT (OUTPATIENT)
Dept: CARDIOLOGY | Facility: CLINIC | Age: 62
End: 2022-02-07

## 2022-02-07 VITALS
DIASTOLIC BLOOD PRESSURE: 57 MMHG | SYSTOLIC BLOOD PRESSURE: 82 MMHG | BODY MASS INDEX: 35.33 KG/M2 | WEIGHT: 192 LBS | HEART RATE: 98 BPM | OXYGEN SATURATION: 94 % | HEIGHT: 62 IN

## 2022-02-07 DIAGNOSIS — I95.89 CHRONIC HYPOTENSION: ICD-10-CM

## 2022-02-07 DIAGNOSIS — I10 ESSENTIAL (PRIMARY) HYPERTENSION: ICD-10-CM

## 2022-02-07 DIAGNOSIS — I50.20 HFREF (HEART FAILURE WITH REDUCED EJECTION FRACTION): Primary | ICD-10-CM

## 2022-02-07 DIAGNOSIS — I25.10 CORONARY ARTERY DISEASE INVOLVING NATIVE CORONARY ARTERY OF NATIVE HEART WITHOUT ANGINA PECTORIS: ICD-10-CM

## 2022-02-07 DIAGNOSIS — N39.0 UTI (URINARY TRACT INFECTION), BACTERIAL: ICD-10-CM

## 2022-02-07 DIAGNOSIS — A49.9 UTI (URINARY TRACT INFECTION), BACTERIAL: ICD-10-CM

## 2022-02-07 PROCEDURE — 99214 OFFICE O/P EST MOD 30 MIN: CPT | Performed by: NURSE PRACTITIONER

## 2022-02-07 RX ORDER — NITROFURANTOIN 25; 75 MG/1; MG/1
100 CAPSULE ORAL 2 TIMES DAILY
Qty: 20 CAPSULE | Refills: 0 | Status: SHIPPED | OUTPATIENT
Start: 2022-02-07 | End: 2022-03-07

## 2022-02-07 RX ORDER — MIDODRINE HYDROCHLORIDE 5 MG/1
5 TABLET ORAL
Qty: 90 TABLET | Refills: 3 | Status: SHIPPED | OUTPATIENT
Start: 2022-02-07 | End: 2022-03-07 | Stop reason: SINTOL

## 2022-02-15 ENCOUNTER — TELEPHONE (OUTPATIENT)
Dept: CARDIOLOGY | Facility: CLINIC | Age: 62
End: 2022-02-15

## 2022-02-15 NOTE — TELEPHONE ENCOUNTER
Tried to contact regarding message on triage to return call, no answer, no further information left. Not able to leave message.

## 2022-02-17 ENCOUNTER — OFFICE VISIT (OUTPATIENT)
Dept: PULMONOLOGY | Facility: CLINIC | Age: 62
End: 2022-02-17

## 2022-02-17 VITALS
BODY MASS INDEX: 35.96 KG/M2 | DIASTOLIC BLOOD PRESSURE: 57 MMHG | HEIGHT: 62 IN | WEIGHT: 195.4 LBS | HEART RATE: 106 BPM | SYSTOLIC BLOOD PRESSURE: 82 MMHG

## 2022-02-17 DIAGNOSIS — F17.201 TOBACCO ABUSE, IN REMISSION: ICD-10-CM

## 2022-02-17 DIAGNOSIS — J44.9 CHRONIC OBSTRUCTIVE PULMONARY DISEASE, UNSPECIFIED COPD TYPE: Primary | ICD-10-CM

## 2022-02-17 DIAGNOSIS — I25.5 ISCHEMIC CARDIOMYOPATHY: ICD-10-CM

## 2022-02-17 DIAGNOSIS — R06.02 SHORTNESS OF BREATH: ICD-10-CM

## 2022-02-17 PROCEDURE — 99214 OFFICE O/P EST MOD 30 MIN: CPT | Performed by: NURSE PRACTITIONER

## 2022-02-17 NOTE — PROGRESS NOTES
Follow Up Office Visit      Patient Name: Maeve Yates    Chief Complaint:    Chief Complaint   Patient presents with   • Follow-up   • Shortness of Breath       History of Present Illness: Maeve Yates is a 61 y.o. female who is here today for follow up of dyspnea.  Since last visit, she was admitted to  in November 2021 for management of cardiogenic shock with renal failure and likely shock liver.  She thereafter underwent ICD placement at [facility].  More recently, she was admitted to the [facility] last month for management of CHF.  She notes that she is now feeling better and that symptoms are at her baseline.  She has been deemed too high risk for CABG.  Her dyspnea is stable and she continues on Breo and Spiriva.  Prior PFT results from  were not able to be obtained for review.    Supplemental Oxygen: 2-3L NC    Subjective      Review of Systems:  Review of Systems   Constitutional: Negative for fever and unexpected weight change.   Respiratory: Positive for shortness of breath. Negative for cough and wheezing.         Past Medical History:   Past Medical History:   Diagnosis Date   • CAD (coronary artery disease)     Catheterization in 2009 at [facility] demonstrated 30% circumflex disease with 50% posterolateral branch.    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    • Peripheral vascular disease (HCC)    • Shortness of breath    • Tobacco use     smokes 2 packs a week   • Wears glasses        Past Surgical History:   Past Surgical History:   Procedure Laterality Date   • AORTAGRAM N/A 8/3/2017    Procedure: AORTAGRAM WITH RUNOFFS AND STENT;  Surgeon: Murphy CERVANTES  MD Christopher;  Location: [facility] OR 15;  Service:    • CARDIAC CATHETERIZATION     • CORONARY ANGIOPLASTY     • CORONARY STENT PLACEMENT      x2   • TUBAL ABDOMINAL LIGATION         Family History:   Family History   Problem Relation Age of Onset   • Hypertension Mother    • Hyperlipidemia Mother    • Skin cancer Mother    • No Known Problems Father        Social History:   Social History     Socioeconomic History   • Marital status:    Tobacco Use   • Smoking status: Former Smoker     Packs/day: 0.50     Years: 40.00     Pack years: 20.00     Types: Cigarettes     Quit date: 2021     Years since quittin.5   • Smokeless tobacco: Never Used   • Tobacco comment:     Substance and Sexual Activity   • Alcohol use: No   • Drug use: No   • Sexual activity: Defer       Current Medications:     Current Outpatient Medications:   •  albuterol (PROVENTIL HFA;VENTOLIN HFA) 108 (90 BASE) MCG/ACT inhaler, Inhale 2 puffs every 4 (four) hours as needed for wheezing or shortness of air., Disp: , Rfl:   •  aspirin 81 MG EC tablet, TAKE ONE TABLET BY MOUTH DAILY, Disp: 30 tablet, Rfl: 11  •  atorvastatin (LIPITOR) 40 MG tablet, Take 40 mg by mouth every night at bedtime., Disp: , Rfl:   •  bumetanide (BUMEX) 2 MG tablet, Take 1 tablet by mouth Daily. (Patient taking differently: Take 2 mg by mouth 2 (Two) Times a Day. Increased by PCP), Disp: 90 tablet, Rfl: 3  •  citalopram (CeleXA) 20 MG tablet, Take 20 mg by mouth Daily., Disp: , Rfl: 1  •  clopidogrel (PLAVIX) 75 MG tablet, TAKE 1 TABLET BY MOUTH DAILY., Disp: 90 tablet, Rfl: 3  •  fenofibrate (TRICOR) 145 MG tablet, Take 145 mg by mouth Daily., Disp: , Rfl: 5  •  Fluticasone Furoate-Vilanterol (Breo Ellipta) 100-25 MCG/INH inhaler, Inhale 1 puff Daily., Disp: , Rfl:   •  gabapentin (NEURONTIN) 800 MG tablet, Take 800 mg by mouth 3 (Three) Times a Day., Disp: , Rfl:   •  Lantus SoloStar 100 UNIT/ML injection pen, , Disp: , Rfl:   •  linaclotide (LINZESS) 145  "MCG capsule capsule, Take 145 mcg by mouth Every Morning Before Breakfast., Disp: , Rfl:   •  lisinopril (PRINIVIL,ZESTRIL) 2.5 MG tablet, Take 2.5 mg by mouth Daily., Disp: , Rfl:   •  midodrine (PROAMATINE) 5 MG tablet, Take 1 tablet by mouth 3 (Three) Times a Day Before Meals., Disp: 90 tablet, Rfl: 3  •  nitrofurantoin, macrocrystal-monohydrate, (Macrobid) 100 MG capsule, Take 1 capsule by mouth 2 (Two) Times a Day., Disp: 20 capsule, Rfl: 0  •  nitroglycerin (Nitrostat) 0.4 MG SL tablet, Place 1 tablet under the tongue Every 5 (Five) Minutes As Needed for Chest Pain., Disp: 25 tablet, Rfl: 3  •  NovoLOG FlexPen 100 UNIT/ML solution pen-injector sc pen, , Disp: , Rfl:   •  pantoprazole (PROTONIX) 40 MG EC tablet, Take 40 mg by mouth Every Morning Before Breakfast., Disp: , Rfl:   •  tiotropium bromide monohydrate (SPIRIVA RESPIMAT) 2.5 MCG/ACT aerosol solution inhaler, Inhale 2 puffs Daily., Disp: , Rfl:      Allergies:   No Known Allergies    Objective     Physical Exam:  Vital Signs:   Vitals:    02/17/22 1046   BP: (!) 82/57   Pulse: 106   Weight: 88.6 kg (195 lb 6.4 oz)   Height: 157.5 cm (62.01\")   95% on 2 L nasal cannula  Body mass index is 35.73 kg/m².    Physical Exam  Constitutional:       General: She is not in acute distress.     Appearance: She is not toxic-appearing.      Comments: + Wearing supplemental oxygen   HENT:      Head: Normocephalic and atraumatic.   Eyes:      Extraocular Movements: Extraocular movements intact.   Cardiovascular:      Rate and Rhythm: Normal rate and regular rhythm.      Heart sounds: Normal heart sounds.   Pulmonary:      Breath sounds: Normal breath sounds.   Abdominal:      General: There is no distension.   Musculoskeletal:         General: No swelling.      Cervical back: Neck supple.   Skin:     General: Skin is warm and dry.      Findings: No rash.   Neurological:      General: No focal deficit present.      Mental Status: She is alert and oriented to person, " place, and time.   Psychiatric:         Mood and Affect: Mood normal.         Behavior: Behavior normal.       Assessment / Plan      Assessment/Plan:   Diagnoses and all orders for this visit:    1. Chronic obstructive pulmonary disease, unspecified COPD type (HCC) (Primary)  -     Pulmonary Function Test; Future  Continue current inhaled regimen. We discussed the risk and benefits of inhaled corticosteroids. Patient instructed to take them on a regular basis as prescribed. Patient instructed to rinse their mouth out after each use.  Schedule repeat PFTs prior to next clinic visit.    2. Shortness of breath  Currently at baseline.  Continue management of COPD and CHF.  Request any available chest imaging from her  and [facility] hospitalizations.    3. Tobacco abuse, in remission  Ongoing cessation advised.    4. Ischemic cardiomyopathy  Now status post ICD placement.  Currently cppears compensated on exam.  Review of her  hospitalization record showed that RHC was performed at that time, which showed normal L and R filling pressures with reduced CO. She was evaluated by the advanced heart failure team as an outpatient and was deemed not a candidate for advanced therapies.    Follow Up:   Return in about 6 months (around 8/17/2022) for Recheck, Follow up after testing complete.  The patient was counseled on diagnostic results, risks and benefits of treatment options, risk factor modifications and the importance of treatment compliance. The patient was advised to contact the clinic with concerns or worsening symptoms.     ANNE Deleon   Pulmonary Medicine [place]     Please note that portions of this note may have been completed with a voice recognition program. Efforts were made to edit the dictations, but occasionally words are mistranscribed

## 2022-03-07 ENCOUNTER — OFFICE VISIT (OUTPATIENT)
Dept: CARDIOLOGY | Facility: CLINIC | Age: 62
End: 2022-03-07

## 2022-03-07 VITALS
DIASTOLIC BLOOD PRESSURE: 53 MMHG | OXYGEN SATURATION: 95 % | WEIGHT: 193 LBS | HEIGHT: 62 IN | HEART RATE: 87 BPM | BODY MASS INDEX: 35.51 KG/M2 | SYSTOLIC BLOOD PRESSURE: 78 MMHG

## 2022-03-07 DIAGNOSIS — I25.5 ISCHEMIC CARDIOMYOPATHY: ICD-10-CM

## 2022-03-07 DIAGNOSIS — R06.02 SHORTNESS OF BREATH: ICD-10-CM

## 2022-03-07 DIAGNOSIS — I25.118 CORONARY ARTERY DISEASE OF NATIVE ARTERY OF NATIVE HEART WITH STABLE ANGINA PECTORIS: ICD-10-CM

## 2022-03-07 DIAGNOSIS — I95.89 CHRONIC HYPOTENSION: Primary | ICD-10-CM

## 2022-03-07 PROCEDURE — 99214 OFFICE O/P EST MOD 30 MIN: CPT | Performed by: NURSE PRACTITIONER

## 2022-03-07 RX ORDER — LEVOFLOXACIN 750 MG/1
750 TABLET ORAL DAILY
COMMUNITY
Start: 2022-02-24 | End: 2022-03-07

## 2022-03-07 RX ORDER — FLUDROCORTISONE ACETATE 0.1 MG/1
0.1 TABLET ORAL DAILY
Qty: 30 TABLET | Refills: 6 | Status: SHIPPED | OUTPATIENT
Start: 2022-03-07 | End: 2022-03-14

## 2022-03-07 NOTE — PROGRESS NOTES
Subjective     Maeve Yates is a 61 y.o. female who presents to day for Cardiomyopathy (4 week follow up ), Chest Pain, and Shortness of Breath.    CHIEF COMPLIANT  Chief Complaint   Patient presents with   • Cardiomyopathy     4 week follow up    • Chest Pain   • Shortness of Breath       Active Problems:  Problem List Items Addressed This Visit        Cardiac and Vasculature    CAD (coronary artery disease)    Cardiomyopathy (HCC)       Pulmonary and Pneumonias    Shortness of breath      Other Visit Diagnoses     Chronic hypotension    -  Primary    Relevant Medications    fludrocortisone 0.1 MG tablet        PROBLEM LIST:  1. Coronary artery disease. Catheterization in 2009 at [facility] demonstrated 30%  circumflex disease with 50% posterolateral branch.   1.1 follow-up catheterization in September 2016 because of non-ST elevation myocardial infarction demonstrated a total occlusion of the right coronary artery with 90% high-grade LAD disease and 50-70% circumflex disease. Stenting of the LAD was performed. Medical management recommended and percutaneous intervention in the circumflex if patient fails medical therapy.  1.2 follow-up catheterization March 2017 with stenting of the circumflex. Patent stent to the LAD with chronic total occlusion of the right coronary artery. Medical management recommended  1.3 left heart catheterization 6/20: Left main normal, LAD 30% in-stent stenosis with left-to-right collaterals, circumflex patent stent, left to right collaterals, % occluded, EF 50 to 55%  1.4 Left heart Catheterization 11/24/24: LAD 95% in-stent stenosis with angioplasty that left minimal residual stenosis,  of the RCA that was unable to be crossed, 90% septal , EF less than 30%  2. Ischemic cardiomyopathy  1.1 echocardiogram 1/22: EF 25 to 30% dilated left and right atrium, no significant valvular disease  3. Hypertension.   4. Chronic tobacco habituation.   5.  Dyslipidemia.   6.  Angioplasty to left lower extremity per Dr. Whtie 8/2/17    HPI  HPI  Mrs. Yates is a 61-year-old female patient who is being followed up today for coronary artery disease in which she went under heart catheterization 11/22 at [facility].  She did receive a stent to 95% stenosis in the LAD but had a  of the RCA in which she is unable to cross as well as an ejection fraction of 30%.  She does have an implantable cardioverter defibrillator in place.  She has not been able to be maximized on guideline driven medication therapy due to chronic arterial hypotension.  Today her blood pressure 78/53 with a heart rate of 87.  Even though she is relatively asymptomatic.  She was previously tried on midodrine in which she had stopped due to GI upset.  She does report episodes of chest pain when she was out working in the yard.  It occurred across her chest and resolved with rest.  We did have an extensive discussion on referring her to a  clinic for potential revascularization of the right coronary artery.  However she wishes to defer at this time.  She does report fatigue where she gets tired easily.  She also reports lower extremity edema that occurs intermittently.  She also has chronic shortness of breath is unchanged nonprogressive.  She does have chronic O2 therapy at 2 L per nasal cannula.  She also has an associated cough in which she is just finished Levaquin that was prescribed by her PCP.  She denies any palpitations, syncope, PND, orthopnea, or strokelike symptoms.  PRIOR MEDS  Current Outpatient Medications on File Prior to Visit   Medication Sig Dispense Refill   • albuterol (PROVENTIL HFA;VENTOLIN HFA) 108 (90 BASE) MCG/ACT inhaler Inhale 2 puffs every 4 (four) hours as needed for wheezing or shortness of air.     • aspirin 81 MG EC tablet TAKE ONE TABLET BY MOUTH DAILY 30 tablet 11   • atorvastatin (LIPITOR) 40 MG tablet Take 40 mg by mouth every night at bedtime.     •  bumetanide (BUMEX) 2 MG tablet Take 1 tablet by mouth Daily. (Patient taking differently: Take 2 mg by mouth 2 (Two) Times a Day. Increased by PCP) 90 tablet 3   • citalopram (CeleXA) 20 MG tablet Take 20 mg by mouth Daily.  1   • clopidogrel (PLAVIX) 75 MG tablet TAKE 1 TABLET BY MOUTH DAILY. 90 tablet 3   • fenofibrate (TRICOR) 145 MG tablet Take 145 mg by mouth Daily.  5   • Fluticasone Furoate-Vilanterol (Breo Ellipta) 100-25 MCG/INH inhaler Inhale 1 puff Daily.     • gabapentin (NEURONTIN) 800 MG tablet Take 800 mg by mouth 3 (Three) Times a Day.     • Lantus SoloStar 100 UNIT/ML injection pen      • linaclotide (LINZESS) 145 MCG capsule capsule Take 145 mcg by mouth Every Morning Before Breakfast.     • lisinopril (PRINIVIL,ZESTRIL) 2.5 MG tablet Take 2.5 mg by mouth Daily.     • nitroglycerin (Nitrostat) 0.4 MG SL tablet Place 1 tablet under the tongue Every 5 (Five) Minutes As Needed for Chest Pain. 25 tablet 3   • NovoLOG FlexPen 100 UNIT/ML solution pen-injector sc pen      • pantoprazole (PROTONIX) 40 MG EC tablet Take 40 mg by mouth Every Morning Before Breakfast.     • tiotropium bromide monohydrate (SPIRIVA RESPIMAT) 2.5 MCG/ACT aerosol solution inhaler Inhale 2 puffs Daily.     • [DISCONTINUED] levoFLOXacin (LEVAQUIN) 750 MG tablet Take 750 mg by mouth Daily.     • [DISCONTINUED] midodrine (PROAMATINE) 5 MG tablet Take 1 tablet by mouth 3 (Three) Times a Day Before Meals. 90 tablet 3   • [DISCONTINUED] nitrofurantoin, macrocrystal-monohydrate, (Macrobid) 100 MG capsule Take 1 capsule by mouth 2 (Two) Times a Day. 20 capsule 0     No current facility-administered medications on file prior to visit.       ALLERGIES  Midodrine    HISTORY  Past Medical History:   Diagnosis Date   • CAD (coronary artery disease)     Catheterization in 2009 at [facility] demonstrated 30% circumflex disease with 50% posterolateral branch.    • Chest pain    • COPD (chronic obstructive pulmonary disease) (HCC)    •  Depression    • Diabetes mellitus (HCC)     checks bs at home daily usually; cannot always follow a diabetic diet she states due to cost of food   • Dyslipidemia    • Fatigue    • Hiatal hernia    • Hyperlipidemia    • Hypertension    • Myocardial infarction (HCC)    • No natural teeth    • Peripheral vascular disease (HCC)    • Shortness of breath    • Tobacco use     smokes 2 packs a week   • Wears glasses        Social History     Socioeconomic History   • Marital status:    Tobacco Use   • Smoking status: Former Smoker     Packs/day: 0.50     Years: 40.00     Pack years: 20.00     Types: Cigarettes     Quit date: 2021     Years since quittin.5   • Smokeless tobacco: Never Used   • Tobacco comment:     Substance and Sexual Activity   • Alcohol use: No   • Drug use: No   • Sexual activity: Defer       Family History   Problem Relation Age of Onset   • Hypertension Mother    • Hyperlipidemia Mother    • Skin cancer Mother    • No Known Problems Father        Review of Systems   Constitutional: Positive for chills and fatigue. Negative for diaphoresis and fever.   Eyes: Negative.  Negative for visual disturbance.   Respiratory: Positive for apnea ( 02 @ 2L), cough (taking antibiotics) and shortness of breath (02 daily at 2L). Negative for chest tightness and wheezing.    Cardiovascular: Positive for chest pain (pain across chest over the weekend after yard work) and leg swelling (ankles). Negative for palpitations.   Gastrointestinal: Positive for constipation. Negative for abdominal pain, blood in stool, diarrhea, nausea and vomiting.   Endocrine: Negative.    Genitourinary: Negative.    Musculoskeletal: Positive for back pain (lower back) and neck pain. Negative for arthralgias and myalgias.   Skin: Negative.    Allergic/Immunologic: Negative.    Neurological: Positive for weakness, light-headedness (with position change) and headaches. Negative for dizziness, syncope and numbness.   Hematological:  "Bruises/bleeds easily.   Psychiatric/Behavioral: Negative.  Negative for sleep disturbance (insomnia).       Objective     VITALS: BP (!) 78/53 (BP Location: Right arm, Patient Position: Sitting)   Pulse 87   Ht 157.5 cm (62.01\")   Wt 87.5 kg (193 lb)   SpO2 95% Comment: 2L 02  BMI 35.29 kg/m²     LABS:   Lab Results (most recent)     None          IMAGING:   No Images in the past 120 days found..    EXAM:  Physical Exam  Vitals and nursing note reviewed.   Constitutional:       Appearance: She is well-developed.   HENT:      Head: Normocephalic.   Eyes:      Pupils: Pupils are equal, round, and reactive to light.   Neck:      Thyroid: No thyroid mass.      Vascular: No carotid bruit or JVD.      Trachea: Trachea and phonation normal.   Cardiovascular:      Rate and Rhythm: Normal rate and regular rhythm.      Pulses:           Radial pulses are 2+ on the right side and 2+ on the left side.        Posterior tibial pulses are 2+ on the right side and 2+ on the left side.      Heart sounds: Normal heart sounds. No murmur heard.    No friction rub. No gallop.   Pulmonary:      Effort: Pulmonary effort is normal. No respiratory distress.      Breath sounds: Normal breath sounds. No wheezing or rales.   Abdominal:      General: Bowel sounds are normal.      Palpations: Abdomen is soft.   Musculoskeletal:         General: Swelling (1+ BLE) present. Normal range of motion.      Cervical back: Neck supple.   Skin:     General: Skin is warm and dry.      Capillary Refill: Capillary refill takes less than 2 seconds.      Findings: No rash.   Neurological:      Mental Status: She is alert and oriented to person, place, and time.   Psychiatric:         Speech: Speech normal.         Behavior: Behavior normal.         Thought Content: Thought content normal.         Judgment: Judgment normal.         Procedure   Procedures       Assessment/Plan    Diagnosis Plan   1. Chronic hypotension  fludrocortisone 0.1 MG tablet   2. " Coronary artery disease of native artery of native heart with stable angina pectoris (HCC)     3. Shortness of breath     4. Ischemic cardiomyopathy     1.  Patient does have chronic arterial hypotension in which her blood pressure has been persistently low.  We tried her on midodrine in which she was unable to tolerate due to GI upset which she discontinued on her own.  We will trial transition her to fludrocortisone to see if we can elevate her blood pressure.  2.  Patient does have significant coronary artery disease which we did discuss referral to  clinic for potential revascularization of RCA.  She wishes to defer at this time.  We are unable to maximize antianginal therapy due to the fact of a low resting blood pressure.  3.  Patient does have systolic heart failure when she does have an implantable cardioverter defibrillator in place.  Unfortunately due to her low blood pressure we will have to hold the lisinopril and once we normalize patient's blood pressure we will try to be more aggressive with heart failure management.  4.  Informed of signs and symptoms of ACS and advised to seek emergent treatment for any new worsening symptoms.  Patient also advised sooner follow-up as needed.  Also advised to follow-up with family doctor as needed  This note is dictated utilizing voice recognition software.  Although this record has been proof read, transcriptional errors may still be present. If questions occur regarding the content of this record please do not hesitate to call our office.  I have reviewed and confirmed the accuracy of the ROS as documented by the MA/LPN/RN ANNE Parry    Return in about 4 weeks (around 4/4/2022), or if symptoms worsen or fail to improve, for 1 week.    Diagnoses and all orders for this visit:    1. Chronic hypotension (Primary)  -     fludrocortisone 0.1 MG tablet; Take 1 tablet by mouth Daily.  Dispense: 30 tablet; Refill: 6    2. Coronary artery disease of native  artery of native heart with stable angina pectoris (HCC)    3. Shortness of breath    4. Ischemic cardiomyopathy               MEDS ORDERED DURING VISIT:  New Medications Ordered This Visit   Medications   • fludrocortisone 0.1 MG tablet     Sig: Take 1 tablet by mouth Daily.     Dispense:  30 tablet     Refill:  6           This document has been electronically signed by Marshall Oquendo Jr., APRN  March 7, 2022 12:50 EST

## 2022-03-14 ENCOUNTER — TELEPHONE (OUTPATIENT)
Dept: CARDIOLOGY | Facility: CLINIC | Age: 62
End: 2022-03-14

## 2022-03-14 DIAGNOSIS — I95.89 CHRONIC HYPOTENSION: Primary | ICD-10-CM

## 2022-03-14 DIAGNOSIS — R06.02 SHORTNESS OF BREATH: Primary | ICD-10-CM

## 2022-03-14 RX ORDER — FLUDROCORTISONE ACETATE 0.1 MG/1
0.2 TABLET ORAL DAILY
Qty: 60 TABLET | Refills: 6 | Status: SHIPPED | OUTPATIENT
Start: 2022-03-14 | End: 2022-05-20

## 2022-03-14 NOTE — TELEPHONE ENCOUNTER
"Tried to return PTS call on VM starting \" return call\", no answer and was not able to leave a message.  "

## 2022-03-14 NOTE — PROGRESS NOTES
BP log reviewed by JR Oquendo with order to increase Fludrocortisone to 0.2 mg daily. Patient aware and will monitor BP. Barb Alamo MA

## 2022-04-06 ENCOUNTER — TELEPHONE (OUTPATIENT)
Dept: CARDIOLOGY | Facility: CLINIC | Age: 62
End: 2022-04-06

## 2022-04-06 NOTE — TELEPHONE ENCOUNTER
Spoke with pt's daughter and she verbally understands. States she will notify, and they will contact pcp to change medication

## 2022-04-06 NOTE — TELEPHONE ENCOUNTER
Patient called and states the PCP has started them on new antibiotic Levaquin, and she states she thought ANNE Parry stopped it at last visit due to it interacting with BP medications, states poss Fludrocortisone.

## 2022-04-15 ENCOUNTER — OFFICE VISIT (OUTPATIENT)
Dept: CARDIOLOGY | Facility: CLINIC | Age: 62
End: 2022-04-15

## 2022-04-15 VITALS
HEART RATE: 88 BPM | SYSTOLIC BLOOD PRESSURE: 112 MMHG | DIASTOLIC BLOOD PRESSURE: 68 MMHG | BODY MASS INDEX: 34.96 KG/M2 | WEIGHT: 190 LBS | HEIGHT: 62 IN | OXYGEN SATURATION: 96 %

## 2022-04-15 DIAGNOSIS — R53.83 FATIGUE, UNSPECIFIED TYPE: ICD-10-CM

## 2022-04-15 DIAGNOSIS — I50.20 HFREF (HEART FAILURE WITH REDUCED EJECTION FRACTION): ICD-10-CM

## 2022-04-15 DIAGNOSIS — I95.89 CHRONIC HYPOTENSION: ICD-10-CM

## 2022-04-15 DIAGNOSIS — R55 SYNCOPE AND COLLAPSE: ICD-10-CM

## 2022-04-15 DIAGNOSIS — I42.9 CARDIOMYOPATHY, UNSPECIFIED TYPE: ICD-10-CM

## 2022-04-15 DIAGNOSIS — I95.89 CHRONIC HYPOTENSION: Primary | ICD-10-CM

## 2022-04-15 DIAGNOSIS — D64.9 ANEMIA, UNSPECIFIED TYPE: ICD-10-CM

## 2022-04-15 DIAGNOSIS — E87.6 HYPOKALEMIA: Primary | ICD-10-CM

## 2022-04-15 DIAGNOSIS — I50.20 SYSTOLIC CONGESTIVE HEART FAILURE, UNSPECIFIED HF CHRONICITY: ICD-10-CM

## 2022-04-15 DIAGNOSIS — R06.02 SHORTNESS OF BREATH: ICD-10-CM

## 2022-04-15 DIAGNOSIS — I25.5 ISCHEMIC CARDIOMYOPATHY: ICD-10-CM

## 2022-04-15 PROCEDURE — 99214 OFFICE O/P EST MOD 30 MIN: CPT | Performed by: NURSE PRACTITIONER

## 2022-04-15 PROCEDURE — 93000 ELECTROCARDIOGRAM COMPLETE: CPT | Performed by: NURSE PRACTITIONER

## 2022-04-15 PROCEDURE — 93289 INTERROG DEVICE EVAL HEART: CPT | Performed by: INTERNAL MEDICINE

## 2022-04-15 RX ORDER — POTASSIUM CHLORIDE 750 MG/1
10 TABLET, FILM COATED, EXTENDED RELEASE ORAL DAILY
Qty: 30 TABLET | Refills: 11 | Status: SHIPPED | OUTPATIENT
Start: 2022-04-15

## 2022-04-15 RX ORDER — ACETAMINOPHEN AND CODEINE PHOSPHATE 300; 30 MG/1; MG/1
1 TABLET ORAL 3 TIMES DAILY PRN
COMMUNITY
Start: 2022-03-31 | End: 2022-12-22

## 2022-04-15 RX ORDER — CEPHALEXIN 500 MG/1
500 CAPSULE ORAL 3 TIMES DAILY
COMMUNITY
End: 2022-05-20

## 2022-04-15 NOTE — PROGRESS NOTES
Dexter Yates is a 61 y.o. female who presents to day for Shortness of Breath, Cardiomyopathy, Hypotension, and 4 wk follow up w / pacer.    CHIEF COMPLIANT  Chief Complaint   Patient presents with   • Shortness of Breath   • Cardiomyopathy   • Hypotension   • 4 wk follow up w / pacer       Active Problems:  Problem List Items Addressed This Visit        Pulmonary and Pneumonias    Shortness of breath    Relevant Medications    potassium chloride 10 MEQ CR tablet    Other Relevant Orders    CBC & Differential    Comprehensive Metabolic Panel    Iron Profile    Vitamin B12    Folate    Duplex Carotid Ultrasound CAR    CT Head Without Contrast       Symptoms and Signs    Fatigue    Relevant Medications    potassium chloride 10 MEQ CR tablet    Other Relevant Orders    CBC & Differential    Comprehensive Metabolic Panel    Iron Profile    Vitamin B12    Folate    Duplex Carotid Ultrasound CAR    CT Head Without Contrast      Other Visit Diagnoses     Hypokalemia    -  Primary    Relevant Medications    potassium chloride 10 MEQ CR tablet    Other Relevant Orders    CBC & Differential    Comprehensive Metabolic Panel    Iron Profile    Vitamin B12    Folate    Duplex Carotid Ultrasound CAR    CT Head Without Contrast    HFrEF (heart failure with reduced ejection fraction) (HCC)        Relevant Medications    potassium chloride 10 MEQ CR tablet    Other Relevant Orders    CBC & Differential    Comprehensive Metabolic Panel    Iron Profile    Vitamin B12    Folate    Duplex Carotid Ultrasound CAR    CT Head Without Contrast    Chronic hypotension        Relevant Medications    potassium chloride 10 MEQ CR tablet    Other Relevant Orders    CBC & Differential    Comprehensive Metabolic Panel    Iron Profile    Vitamin B12    Folate    Duplex Carotid Ultrasound CAR    CT Head Without Contrast    Anemia, unspecified type        Relevant Orders    Iron Profile    Vitamin B12    Folate    Duplex Carotid  Ultrasound CAR    CT Head Without Contrast    Syncope and collapse        Relevant Orders    Duplex Carotid Ultrasound CAR    CT Head Without Contrast              PROBLEM LIST:  1. Coronary artery disease. Catheterization in 2009 at [facility] demonstrated 30%  circumflex disease with 50% posterolateral branch.   1.1 follow-up catheterization in September 2016 because of non-ST elevation myocardial infarction demonstrated a total occlusion of the right coronary artery with 90% high-grade LAD disease and 50-70% circumflex disease. Stenting of the LAD was performed. Medical management recommended and percutaneous intervention in the circumflex if patient fails medical therapy.  1.2 follow-up catheterization March 2017 with stenting of the circumflex. Patent stent to the LAD with chronic total occlusion of the right coronary artery. Medical management recommended  1.3 left heart catheterization 6/20: Left main normal, LAD 30% in-stent stenosis with left-to-right collaterals, circumflex patent stent, left to right collaterals, % occluded, EF 50 to 55%  1.4 Left heart Catheterization 11/24/24: LAD 95% in-stent stenosis with angioplasty that left minimal residual stenosis,  of the RCA that was unable to be crossed, 90% septal , EF less than 30%  2. Ischemic cardiomyopathy  1.1 echocardiogram 1/22: EF 25 to 30% dilated left and right atrium, no significant valvular disease  3. Hypertension.   4. Chronic tobacco habituation.   5. Dyslipidemia.   6.  Angioplasty to left lower extremity per Dr. White 8/2/17      HPI  HPI  Ms. chaves is a 61-year-old female patient who is being followed up today for hypotension, coronary artery disease, and ischemic cardiomyopathy.  Patient did have previous hypotension in which medication changes were made and her blood pressure has returned to a normal tensive pressure of 112/68 with a heart rate of 88.  She reports that her blood pressures been doing great at home  and she does feel some better.  When her blood pressure is low she does experience dizziness lightheadedness as well as generalized weakness.  She does report chest pain that occurs in her mid sternum and only lasts for a few seconds at a time.  She describes it as a sharp-like sensation that is short-lived and occurs both with rest and exertion.  She also has shortness of breath that occurs with activity.  She says room minimal activity and movement causes her to become dyspneic.  She does have some dyspnea at rest as well with associated cough.  She does require oxygen 24/7.  Patient also reports syncope where she gets jittery and her hands jerk.  She has had 3 different episodes of syncope that lasted few minutes per episode.  With the last episode she did not have any warning she is at a syncopal episode.  She says she has twitching and rolling of her eyes.  She denies any loss of bowel or bladder control.  She does report fatigue where she is really tired all the time and has no energy.  She did have her device interrogated today which showed no events.  She does have a history of chronic arterial hypertension is well but most recently she has been hypotensive for stated above.  She also has a history of ischemic cardiomyopathy and coronary artery disease.  She is on lisinopril 2.5 daily for her ischemic cardiomyopathy and was unable to tolerate beta-blocker therapy due to hypotension.  For her coronary artery disease she is on high intensity statin therapy of a atorvastatin as well as dual antiplatelet therapy of aspirin and Plavix.  PRIOR MEDS  Current Outpatient Medications on File Prior to Visit   Medication Sig Dispense Refill   • acetaminophen-codeine (TYLENOL #3) 300-30 MG per tablet Take 1 tablet by mouth 3 (Three) Times a Day As Needed.     • albuterol (PROVENTIL HFA;VENTOLIN HFA) 108 (90 BASE) MCG/ACT inhaler Inhale 2 puffs every 4 (four) hours as needed for wheezing or shortness of air.     • aspirin  81 MG EC tablet TAKE ONE TABLET BY MOUTH DAILY 30 tablet 11   • atorvastatin (LIPITOR) 40 MG tablet Take 40 mg by mouth every night at bedtime.     • bumetanide (BUMEX) 2 MG tablet Take 1 tablet by mouth Daily. (Patient taking differently: Take 2 mg by mouth 2 (Two) Times a Day. Increased by PCP) 90 tablet 3   • cephalexin (KEFLEX) 500 MG capsule Take 500 mg by mouth 3 (Three) Times a Day. For ten days     • citalopram (CeleXA) 20 MG tablet Take 20 mg by mouth Daily.  1   • clopidogrel (PLAVIX) 75 MG tablet TAKE 1 TABLET BY MOUTH DAILY. 90 tablet 3   • fenofibrate (TRICOR) 145 MG tablet Take 145 mg by mouth Daily.  5   • fludrocortisone 0.1 MG tablet Take 2 tablets by mouth Daily. 60 tablet 6   • Fluticasone Furoate-Vilanterol (Breo Ellipta) 100-25 MCG/INH inhaler Inhale 1 puff Daily.     • gabapentin (NEURONTIN) 800 MG tablet Take 800 mg by mouth 3 (Three) Times a Day.     • Lantus SoloStar 100 UNIT/ML injection pen      • linaclotide (LINZESS) 145 MCG capsule capsule Take 145 mcg by mouth Every Morning Before Breakfast.     • lisinopril (PRINIVIL,ZESTRIL) 2.5 MG tablet Take 2.5 mg by mouth Daily.     • nitroglycerin (Nitrostat) 0.4 MG SL tablet Place 1 tablet under the tongue Every 5 (Five) Minutes As Needed for Chest Pain. 25 tablet 3   • NovoLOG FlexPen 100 UNIT/ML solution pen-injector sc pen      • pantoprazole (PROTONIX) 40 MG EC tablet Take 40 mg by mouth Every Morning Before Breakfast.     • potassium & sodium phosphates (PHOS-NAK) 280-160-250 MG pack packet Take 1 packet by mouth Daily.     • tiotropium bromide monohydrate (SPIRIVA RESPIMAT) 2.5 MCG/ACT aerosol solution inhaler Inhale 2 puffs Daily.       No current facility-administered medications on file prior to visit.       ALLERGIES  Hydrocodone and Midodrine    HISTORY  Past Medical History:   Diagnosis Date   • CAD (coronary artery disease)     Catheterization in 2009 at [facility] demonstrated 30% circumflex disease with 50% posterolateral  "branch.    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    • Peripheral vascular disease (HCC)    • Shortness of breath    • Tobacco use     smokes 2 packs a week   • Wears glasses        Social History     Socioeconomic History   • Marital status:    Tobacco Use   • Smoking status: Former Smoker     Packs/day: 0.50     Years: 40.00     Pack years: 20.00     Types: Cigarettes     Quit date: 2021     Years since quittin.7   • Smokeless tobacco: Never Used   • Tobacco comment:     Substance and Sexual Activity   • Alcohol use: No   • Drug use: No   • Sexual activity: Defer       Family History   Problem Relation Age of Onset   • Hypertension Mother    • Hyperlipidemia Mother    • Skin cancer Mother    • No Known Problems Father        Review of Systems   Constitutional: Positive for fatigue. Negative for chills and fever.   HENT: Positive for congestion (keflex). Negative for rhinorrhea and sore throat.    Respiratory: Positive for shortness of breath. Negative for chest tightness.    Cardiovascular: Positive for chest pain. Negative for palpitations and leg swelling.   Gastrointestinal: Positive for constipation. Negative for diarrhea and nausea.   Musculoskeletal: Negative for arthralgias, back pain and neck pain.   Neurological: Positive for dizziness, syncope (got up on her own potassium was low), weakness and light-headedness.   Hematological: Bruises/bleeds easily.   Psychiatric/Behavioral: Positive for sleep disturbance (can't sleep).       Objective     VITALS: /68 (BP Location: Right arm, Patient Position: Sitting)   Pulse 88   Ht 157.5 cm (62.01\")   Wt 86.2 kg (190 lb)   " SpO2 96%   BMI 34.74 kg/m²     LABS:   Lab Results (most recent)     None          IMAGING:   No Images in the past 120 days found..    EXAM:  Physical Exam  Eyes:      Comments: Jaundice b/l eyes     Pulmonary:      Breath sounds: Examination of the right-lower field reveals decreased breath sounds. Examination of the left-lower field reveals decreased breath sounds. Decreased breath sounds and rales (bilateral bases) present.   Musculoskeletal:         General: Swelling (LLE) present.   Skin:               Procedure     ECG 12 Lead    Date/Time: 4/15/2022 11:17 AM  Performed by: Marshall Oquendo APRN  Authorized by: Marshall Oquendo APRN   Comparison: compared with previous ECG from 11/1/2021  Similar to previous ECG  Rhythm: sinus rhythm  Ectopy: unifocal PVCs  Rate: normal  BPM: 90  Conduction: non-specific intraventricular conduction delay  QRS axis: normal  Other findings: non-specific ST-T wave changes  Comments:  ms  No               Assessment/Plan    Diagnosis Plan   1. Hypokalemia  potassium chloride 10 MEQ CR tablet    CBC & Differential    Comprehensive Metabolic Panel    Iron Profile    Vitamin B12    Folate    Duplex Carotid Ultrasound CAR    CT Head Without Contrast   2. HFrEF (heart failure with reduced ejection fraction) (HCC)  potassium chloride 10 MEQ CR tablet    CBC & Differential    Comprehensive Metabolic Panel    Iron Profile    Vitamin B12    Folate    Duplex Carotid Ultrasound CAR    CT Head Without Contrast   3. Shortness of breath  potassium chloride 10 MEQ CR tablet    CBC & Differential    Comprehensive Metabolic Panel    Iron Profile    Vitamin B12    Folate    Duplex Carotid Ultrasound CAR    CT Head Without Contrast   4. Chronic hypotension  potassium chloride 10 MEQ CR tablet    CBC & Differential    Comprehensive Metabolic Panel    Iron Profile    Vitamin B12    Folate    Duplex Carotid Ultrasound CAR    CT Head Without Contrast   5. Fatigue, unspecified type  potassium  chloride 10 MEQ CR tablet    CBC & Differential    Comprehensive Metabolic Panel    Iron Profile    Vitamin B12    Folate    Duplex Carotid Ultrasound CAR    CT Head Without Contrast   6. Anemia, unspecified type  Iron Profile    Vitamin B12    Folate    Duplex Carotid Ultrasound CAR    CT Head Without Contrast   7. Syncope and collapse  Duplex Carotid Ultrasound CAR    CT Head Without Contrast   1.  Due to patient's syncope and collapse on 3 different occasions  That is unexplained I would like for her to have a CT of her head due to this and altered mental status.  I would also like to get a carotid duplex as well to rule out carotid artery disease as a potential cause of her symptoms as well.  2.  Patient's blood pressure is controlled on current blood pressure medication regimen.  No medication changes are warranted at this time.  Patient advised to monitor blood pressure on a daily basis and report any persistent highs or lows.  Set goal blood pressure for patient at 130/80 or below.  3.  Due to patient's ischemic cardiomyopathy in which she is on lisinopril if we are able to maintain a normotensive blood pressure we will try to initiate low-dose beta-blocker therapy with metoprolol succinate to follow guideline driven therapy.  4.  Due to patient's hypokalemia we will start her on potassium 10 mEq daily to supplement.  5.  We will do an extensive laboratory work-up including vitamin B12, folate, CBC, CMP, 2 iron panel, to help evaluate for potential causes of patient's symptoms.  6.  Informed of signs and symptoms of ACS and advised to seek emergent treatment for any new worsening symptoms.  Patient also advised sooner follow-up as needed.  Also advised to follow-up with family doctor as needed  This note is dictated utilizing voice recognition software.  Although this record has been proof read, transcriptional errors may still be present. If questions occur regarding the content of this record please do not  hesitate to call our office.  I have reviewed and confirmed the accuracy of the ROS as documented by the MA/LPN/RN ANNE Parry  Return in about 4 weeks (around 5/13/2022), or if symptoms worsen or fail to improve.    Diagnoses and all orders for this visit:    1. Hypokalemia (Primary)  -     potassium chloride 10 MEQ CR tablet; Take 1 tablet by mouth Daily.  Dispense: 30 tablet; Refill: 11  -     CBC & Differential; Future  -     Comprehensive Metabolic Panel; Future  -     Iron Profile; Future  -     Vitamin B12; Future  -     Folate; Future  -     Duplex Carotid Ultrasound CAR; Future  -     CT Head Without Contrast; Future    2. HFrEF (heart failure with reduced ejection fraction) (HCC)  -     potassium chloride 10 MEQ CR tablet; Take 1 tablet by mouth Daily.  Dispense: 30 tablet; Refill: 11  -     CBC & Differential; Future  -     Comprehensive Metabolic Panel; Future  -     Iron Profile; Future  -     Vitamin B12; Future  -     Folate; Future  -     Duplex Carotid Ultrasound CAR; Future  -     CT Head Without Contrast; Future    3. Shortness of breath  -     potassium chloride 10 MEQ CR tablet; Take 1 tablet by mouth Daily.  Dispense: 30 tablet; Refill: 11  -     CBC & Differential; Future  -     Comprehensive Metabolic Panel; Future  -     Iron Profile; Future  -     Vitamin B12; Future  -     Folate; Future  -     Duplex Carotid Ultrasound CAR; Future  -     CT Head Without Contrast; Future    4. Chronic hypotension  -     potassium chloride 10 MEQ CR tablet; Take 1 tablet by mouth Daily.  Dispense: 30 tablet; Refill: 11  -     CBC & Differential; Future  -     Comprehensive Metabolic Panel; Future  -     Iron Profile; Future  -     Vitamin B12; Future  -     Folate; Future  -     Duplex Carotid Ultrasound CAR; Future  -     CT Head Without Contrast; Future    5. Fatigue, unspecified type  -     potassium chloride 10 MEQ CR tablet; Take 1 tablet by mouth Daily.  Dispense: 30 tablet; Refill: 11  -      CBC & Differential; Future  -     Comprehensive Metabolic Panel; Future  -     Iron Profile; Future  -     Vitamin B12; Future  -     Folate; Future  -     Duplex Carotid Ultrasound CAR; Future  -     CT Head Without Contrast; Future    6. Anemia, unspecified type  -     Iron Profile; Future  -     Vitamin B12; Future  -     Folate; Future  -     Duplex Carotid Ultrasound CAR; Future  -     CT Head Without Contrast; Future    7. Syncope and collapse  -     Duplex Carotid Ultrasound CAR; Future  -     CT Head Without Contrast; Future    Other orders  -     ECG 12 Lead        Maeve A Page  reports that she quit smoking about 8 months ago. Her smoking use included cigarettes. She has a 20.00 pack-year smoking history. She has never used smokeless tobacco.. I have educated her on the risk of diseases from using tobacco products.        MEDS ORDERED DURING VISIT:  New Medications Ordered This Visit   Medications   • potassium chloride 10 MEQ CR tablet     Sig: Take 1 tablet by mouth Daily.     Dispense:  30 tablet     Refill:  11           This document has been electronically signed by Marshall Oquendo Jr., APRN  April 28, 2022 22:19 EDT

## 2022-04-20 ENCOUNTER — APPOINTMENT (OUTPATIENT)
Dept: CARDIOLOGY | Facility: HOSPITAL | Age: 62
End: 2022-04-20

## 2022-04-26 ENCOUNTER — APPOINTMENT (OUTPATIENT)
Dept: CARDIOLOGY | Facility: HOSPITAL | Age: 62
End: 2022-04-26

## 2022-05-20 ENCOUNTER — OFFICE VISIT (OUTPATIENT)
Dept: CARDIOLOGY | Facility: CLINIC | Age: 62
End: 2022-05-20

## 2022-05-20 VITALS
SYSTOLIC BLOOD PRESSURE: 98 MMHG | BODY MASS INDEX: 36.8 KG/M2 | HEIGHT: 62 IN | DIASTOLIC BLOOD PRESSURE: 63 MMHG | HEART RATE: 60 BPM | WEIGHT: 200 LBS | OXYGEN SATURATION: 100 %

## 2022-05-20 DIAGNOSIS — I25.10 CORONARY ARTERY DISEASE INVOLVING NATIVE CORONARY ARTERY OF NATIVE HEART WITHOUT ANGINA PECTORIS: ICD-10-CM

## 2022-05-20 DIAGNOSIS — I50.22 CHRONIC SYSTOLIC CONGESTIVE HEART FAILURE: ICD-10-CM

## 2022-05-20 DIAGNOSIS — I10 ESSENTIAL (PRIMARY) HYPERTENSION: ICD-10-CM

## 2022-05-20 DIAGNOSIS — E87.6 HYPOKALEMIA: Primary | ICD-10-CM

## 2022-05-20 PROCEDURE — 99214 OFFICE O/P EST MOD 30 MIN: CPT | Performed by: NURSE PRACTITIONER

## 2022-05-20 RX ORDER — LACTULOSE 10 G/15ML
30 SOLUTION ORAL 2 TIMES DAILY PRN
COMMUNITY
End: 2022-12-22

## 2022-05-20 RX ORDER — DOCUSATE SODIUM 250 MG
250 CAPSULE ORAL DAILY
COMMUNITY

## 2022-05-20 RX ORDER — SIMETHICONE 80 MG
80 TABLET,CHEWABLE ORAL EVERY 6 HOURS PRN
COMMUNITY

## 2022-05-20 RX ORDER — NITROGLYCERIN 0.4 MG/1
0.4 TABLET SUBLINGUAL
Qty: 25 TABLET | Refills: 3 | Status: SHIPPED | OUTPATIENT
Start: 2022-05-20

## 2022-05-20 RX ORDER — METOPROLOL SUCCINATE 25 MG/1
25 TABLET, EXTENDED RELEASE ORAL DAILY
COMMUNITY

## 2022-05-20 RX ORDER — FERROUS SULFATE 325(65) MG
325 TABLET ORAL
COMMUNITY

## 2022-05-20 NOTE — PROGRESS NOTES
Subjective     Maeve Yates is a 61 y.o. female who presents to day for 1 month follow up  [facility]/ [facility]  and Hypotension.    CHIEF COMPLIANT  Chief Complaint   Patient presents with   • 1 month follow up  [facility]/ [facility]    • Hypotension       Active Problems:  Problem List Items Addressed This Visit        Cardiac and Vasculature    Essential (primary) hypertension    Relevant Medications    metoprolol succinate XL (TOPROL-XL) 25 MG 24 hr tablet    Coronary artery disease involving native coronary artery of native heart without angina pectoris    Relevant Medications    metoprolol succinate XL (TOPROL-XL) 25 MG 24 hr tablet    nitroglycerin (Nitrostat) 0.4 MG SL tablet    Systolic congestive heart failure (HCC)    Relevant Medications    metoprolol succinate XL (TOPROL-XL) 25 MG 24 hr tablet    nitroglycerin (Nitrostat) 0.4 MG SL tablet      Other Visit Diagnoses     Hypokalemia    -  Primary    Relevant Orders    Basic Metabolic Panel      1. Coronary artery disease. Catheterization in 2009 at [facility] demonstrated 30%  circumflex disease with 50% posterolateral branch.   1.1 follow-up catheterization in September 2016 because of non-ST elevation myocardial infarction demonstrated a total occlusion of the right coronary artery with 90% high-grade LAD disease and 50-70% circumflex disease. Stenting of the LAD was performed. Medical management recommended and percutaneous intervention in the circumflex if patient fails medical therapy.  1.2 follow-up catheterization March 2017 with stenting of the circumflex. Patent stent to the LAD with chronic total occlusion of the right coronary artery. Medical management recommended  1.3 left heart catheterization 6/20: Left main normal, LAD 30% in-stent stenosis with left-to-right collaterals, circumflex patent stent, left to right collaterals, % occluded, EF 50 to 55%  1.4 Left heart Catheterization 11/24/21: LAD 95% in-stent stenosis with angioplasty that left minimal  "residual stenosis,  of the RCA that was unable to be crossed, 90% septal , EF less than 30%  2. Ischemic cardiomyopathy  1.1 echocardiogram 1/22: EF 25 to 30% dilated left and right atrium, no significant valvular disease  3. Hypertension.   4. Chronic tobacco habituation.   5. Dyslipidemia.   6.  Angioplasty to left lower extremity per Dr. White 8/2/17    HPI  HPI  Ms. chaves is a 61-year-old female patient who is being followed up today for coronary artery disease, and ischemic cardiomyopathy.  Patient does have significant history of coronary artery disease with her last left heart catheterization being in November 2021 in which she had 95% in-stent stenosis of the LAD which then went under angioplasty and  of the RCA and 90% of a septal  with an ejection fraction of approximately 30%.  She was seen at [facility] emergency department due to syncopal episode with fall.  Patient reported that she got up to go to the bathroom when and she had a syncopal episode.  She is unconscious for an unknown amount of time.  She was reported to have some convulsions by her grandson with associated vomiting.  While she was in the emergency department she was found to have episodes of bradycardia with heart rate in the 20s that would rebound 120.  She is also witnessed to have a defibrillation by her AICD.  In addition of this she was found to have hypokalemia with a potassium of 1.9 and lactic acidosis of 5.7.  She also reports chronic chest pain that occurs her left sternal border that is intermittent.  She says it only lasts for few seconds per episode.  She describes it as \"hurts\".  She says occurs both with activity and at rest.  She does have associated shortness of breath as well.  She also has chronic shortness of breath that occurs with rest and activity.  She is on chronic O2 therapy.  She says she really feels like she is doing good good now.  She says that she is doing better than what she has in " the past.  Today her blood pressure is 98/63 heart rate is 60.  She has been running a chronically low normal to hypotensive blood pressures.  She is on metoprolol succinate and due to hypotension we are having to stop the lisinopril.  She is on high intensity statin therapy including atorvastatin 40 mg daily.  She also reports continuation of her lower extremity edema.  Dizziness lightheadedness occurs with position changes and with low blood pressure.  She denies any palpitations or other strokelike symptoms.    PIOR MEDS  Current Outpatient Medications on File Prior to Visit   Medication Sig Dispense Refill   • acetaminophen-codeine (TYLENOL #3) 300-30 MG per tablet Take 1 tablet by mouth 3 (Three) Times a Day As Needed.     • albuterol (PROVENTIL HFA;VENTOLIN HFA) 108 (90 BASE) MCG/ACT inhaler Inhale 2 puffs every 4 (four) hours as needed for wheezing or shortness of air.     • aspirin 81 MG EC tablet TAKE ONE TABLET BY MOUTH DAILY 30 tablet 11   • atorvastatin (LIPITOR) 40 MG tablet Take 40 mg by mouth every night at bedtime.     • bumetanide (BUMEX) 2 MG tablet Take 1 tablet by mouth Daily. (Patient taking differently: Take 2 mg by mouth 2 (Two) Times a Day. Increased by PCP) 90 tablet 3   • citalopram (CeleXA) 20 MG tablet Take 20 mg by mouth Daily.  1   • clopidogrel (PLAVIX) 75 MG tablet TAKE 1 TABLET BY MOUTH DAILY. 90 tablet 3   • docusate sodium (COLACE) 250 MG capsule Take 250 mg by mouth Daily.     • fenofibrate (TRICOR) 145 MG tablet Take 145 mg by mouth Daily.  5   • ferrous sulfate 325 (65 FE) MG tablet Take 325 mg by mouth Daily With Breakfast.     • Fluticasone Furoate-Vilanterol (Breo Ellipta) 100-25 MCG/INH inhaler Inhale 1 puff Daily.     • gabapentin (NEURONTIN) 800 MG tablet Take 800 mg by mouth 4 (Four) Times a Day.     • lactulose (CHRONULAC) 10 GM/15ML solution Take 30 g by mouth 2 (Two) Times a Day As Needed.     • Lantus SoloStar 100 UNIT/ML injection pen      • linaclotide (LINZESS) 290  MCG capsule capsule Take 290 mcg by mouth Every Morning Before Breakfast.     • metoprolol succinate XL (TOPROL-XL) 25 MG 24 hr tablet Take 25 mg by mouth Daily.     • NovoLOG FlexPen 100 UNIT/ML solution pen-injector sc pen      • pantoprazole (PROTONIX) 40 MG EC tablet Take 40 mg by mouth Every Morning Before Breakfast.     • potassium chloride 10 MEQ CR tablet Take 1 tablet by mouth Daily. 30 tablet 11   • simethicone (MYLICON) 80 MG chewable tablet Chew 80 mg Every 6 (Six) Hours As Needed for Flatulence.     • tiotropium bromide monohydrate (SPIRIVA RESPIMAT) 2.5 MCG/ACT aerosol solution inhaler Inhale 2 puffs Daily.       No current facility-administered medications on file prior to visit.       ALLERGIES  Hydrocodone and Midodrine    HISTORY  Past Medical History:   Diagnosis Date   • CAD (coronary artery disease)     Catheterization in  at [facility] demonstrated 30% circumflex disease with 50% posterolateral branch.    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    • Peripheral vascular disease (HCC)    • Shortness of breath    • Tobacco use     smokes 2 packs a week   • Wears glasses        Social History     Socioeconomic History   • Marital status:    Tobacco Use   • Smoking status: Former Smoker     Packs/day: 0.50     Years: 40.00     Pack years: 20.00     Types: Cigarettes     Quit date: 2021     Years since quittin.8   • Smokeless tobacco: Never Used   • Tobacco comment:     Substance and Sexual Activity   • Alcohol use: No   • Drug use: No   • Sexual activity: Defer       Family History   Problem Relation Age of Onset   • Hypertension Mother    • Hyperlipidemia  "Mother    • Skin cancer Mother    • No Known Problems Father        Review of Systems   Constitutional: Negative for chills, fatigue and fever.   HENT: Negative for congestion, rhinorrhea and sore throat.    Respiratory: Positive for shortness of breath. Negative for chest tightness.    Cardiovascular: Positive for chest pain and leg swelling. Negative for palpitations.   Gastrointestinal: Positive for constipation (doing better since meds added). Negative for diarrhea and nausea.   Musculoskeletal: Negative for arthralgias, back pain and neck pain.   Allergic/Immunologic: Positive for environmental allergies. Negative for food allergies.   Neurological: Positive for dizziness, syncope, weakness and light-headedness.   Hematological: Bruises/bleeds easily.   Psychiatric/Behavioral: Negative for sleep disturbance.       Objective     VITALS: BP 98/63 (BP Location: Left arm, Patient Position: Sitting)   Pulse 60   Ht 157.5 cm (62.01\")   Wt 90.7 kg (200 lb)   SpO2 100%   BMI 36.57 kg/m²     LABS:   Lab Results (most recent)     None          IMAGING:   No Images in the past 120 days found..    EXAM:  Physical Exam  Vitals and nursing note reviewed.   Constitutional:       Appearance: She is well-developed.   HENT:      Head: Normocephalic.   Eyes:      Pupils: Pupils are equal, round, and reactive to light.   Neck:      Thyroid: No thyroid mass.      Vascular: No carotid bruit or JVD.      Trachea: Trachea and phonation normal.   Cardiovascular:      Rate and Rhythm: Normal rate and regular rhythm.      Pulses:           Radial pulses are 2+ on the right side and 2+ on the left side.        Posterior tibial pulses are 2+ on the right side and 2+ on the left side.      Heart sounds: Normal heart sounds. No murmur heard.    No friction rub. No gallop.   Pulmonary:      Effort: Pulmonary effort is normal. No respiratory distress.      Breath sounds: Decreased air movement present. Decreased breath sounds present. No " wheezing or rales.   Abdominal:      General: Bowel sounds are normal.      Palpations: Abdomen is soft.   Musculoskeletal:         General: Swelling present. Normal range of motion.      Cervical back: Neck supple.   Skin:     General: Skin is warm and dry.      Capillary Refill: Capillary refill takes less than 2 seconds.      Findings: No rash.   Neurological:      Mental Status: She is alert and oriented to person, place, and time.   Psychiatric:         Speech: Speech normal.         Behavior: Behavior normal.         Thought Content: Thought content normal.         Judgment: Judgment normal.         Procedure   Procedures       Assessment & Plan    Diagnosis Plan   1. Hypokalemia  Basic Metabolic Panel   2. Chronic systolic congestive heart failure (HCC)     3. Coronary artery disease involving native coronary artery of native heart without angina pectoris     4. Essential (primary) hypertension     1.  Patient does have a significantly reduced potassium of 1.9 while she was in the hospital.  She was supplemented with 40 of potassium.  During this time she also received defibrillation from her AICD and had a syncopal episode.  She is evaluated and admitted at [facility] went under an extensive laboratory work-up.  She did have an echocardiogram that identified her EF has dropped to 25%.  She is now on metoprolol succinate but due to hypotension had to discontinue the lisinopril.  We will repeat her BMP to evaluate her potassium.  2.  Patient does have chronic systolic heart failure as mentioned above.  She is on guideline driven medication metoprolol is unable to tolerate lisinopril due to low normal blood pressures to hypotension.  3.  Patient does have chronic coronary artery disease with multiple areas of stenosis in which she received a PTCA of her LAD for in-stent restenosis.  She is on dual antiplatelet therapy of aspirin and Plavix.  High intensity statin therapy is of a atorvastatin 40 mg daily.  4.   Patient does have a history of hypertension however she has been running low normal to hypotensive blood pressures are compliant.  She will continue to monitor blood pressure on a routine basis report any significant highs or lows.  5.  Informed of signs and symptoms of ACS and advised to seek emergent treatment for any new worsening symptoms.  Patient also advised sooner follow-up as needed.  Also advised to follow-up with family doctor as needed  This note is dictated utilizing voice recognition software.  Although this record has been proof read, transcriptional errors may still be present. If questions occur regarding the content of this record please do not hesitate to call our office.  I have reviewed and confirmed the accuracy of the ROS as documented by the MA/LPN/RN ANNE Parry    Return in about 2 months (around 7/20/2022), or if symptoms worsen or fail to improve.    Diagnoses and all orders for this visit:    1. Hypokalemia (Primary)  -     Basic Metabolic Panel; Future    2. Chronic systolic congestive heart failure (HCC)    3. Coronary artery disease involving native coronary artery of native heart without angina pectoris    4. Essential (primary) hypertension    Other orders  -     nitroglycerin (Nitrostat) 0.4 MG SL tablet; Place 1 tablet under the tongue Every 5 (Five) Minutes As Needed for Chest Pain.  Dispense: 25 tablet; Refill: 3        Maeve A Page  reports that she quit smoking about 9 months ago. Her smoking use included cigarettes. She has a 20.00 pack-year smoking history. She has never used smokeless tobacco.. I have educated her on the risk of diseases from using tobacco products .    Advance Care Planning   ACP discussion was held with the patient during this visit. Patient does not have an advance directive, declines further assistance.          MEDS ORDERED DURING VISIT:  New Medications Ordered This Visit   Medications   • nitroglycerin (Nitrostat) 0.4 MG SL tablet     Sig:  Place 1 tablet under the tongue Every 5 (Five) Minutes As Needed for Chest Pain.     Dispense:  25 tablet     Refill:  3           This document has been electronically signed by Marshall Oquendo Jr., APRN  May 30, 2022 23:27 EDT

## 2022-06-01 ENCOUNTER — TELEPHONE (OUTPATIENT)
Dept: CARDIOLOGY | Facility: CLINIC | Age: 62
End: 2022-06-01

## 2022-06-01 RX ORDER — VERICIGUAT 5 MG/1
5 TABLET, FILM COATED ORAL DAILY
Qty: 14 TABLET | Refills: 0 | Status: SHIPPED | OUTPATIENT
Start: 2022-06-01 | End: 2022-12-22

## 2022-06-01 RX ORDER — VERICIGUAT 10 MG/1
10 TABLET, FILM COATED ORAL DAILY
Qty: 30 TABLET | Refills: 6 | Status: SHIPPED | OUTPATIENT
Start: 2022-06-01 | End: 2022-12-22

## 2022-06-01 RX ORDER — VERICIGUAT 2.5 MG/1
2.5 TABLET, FILM COATED ORAL DAILY
Qty: 14 TABLET | Refills: 0 | Status: SHIPPED | OUTPATIENT
Start: 2022-06-01 | End: 2022-12-22

## 2022-06-01 NOTE — TELEPHONE ENCOUNTER
I called patient back to let them know that the medication would be titrated up but I got a recording that said I could not leave a message. I will try back later today.

## 2022-06-01 NOTE — TELEPHONE ENCOUNTER
Called patient spoke with Amy who is listed on [facility] . I advised patient daughter about Verquvo and the possible benefits to quality of life. She is willing to try it .    Solange WARDA

## 2022-06-09 ENCOUNTER — TELEPHONE (OUTPATIENT)
Dept: CARDIOLOGY | Facility: CLINIC | Age: 62
End: 2022-06-09

## 2022-06-09 NOTE — TELEPHONE ENCOUNTER
Patients daughter Amy called and states her PCP has put her on an antibiotic Levaquil and that ANNE Parry said it would mess with her BP due to it already being low, and that Px has it on file.    I advised she would need to discuss concerns with PCP, or could contact pharmacy to contact them due to this.Verbally understands.    MARCIO SNIDER MA

## 2022-07-05 ENCOUNTER — TELEPHONE (OUTPATIENT)
Dept: CARDIOLOGY | Facility: CLINIC | Age: 62
End: 2022-07-05

## 2022-07-05 NOTE — TELEPHONE ENCOUNTER
Received cardiac clearance request from  stating pt has cataract surgery scheduled for 08/16/2022 and is requiring a cardiac clearance. Placed cardiac clearance request in Solange's inbox to review and address with provider.

## 2022-08-05 ENCOUNTER — OFFICE VISIT (OUTPATIENT)
Dept: CARDIOLOGY | Facility: CLINIC | Age: 62
End: 2022-08-05

## 2022-08-05 DIAGNOSIS — R06.02 SHORTNESS OF BREATH: ICD-10-CM

## 2022-08-05 DIAGNOSIS — I47.29 NSVT (NONSUSTAINED VENTRICULAR TACHYCARDIA): ICD-10-CM

## 2022-08-05 DIAGNOSIS — I42.9 CARDIOMYOPATHY, UNSPECIFIED TYPE: ICD-10-CM

## 2022-08-05 DIAGNOSIS — I50.22 CHRONIC SYSTOLIC CONGESTIVE HEART FAILURE: Primary | ICD-10-CM

## 2022-08-05 DIAGNOSIS — R55 SYNCOPE AND COLLAPSE: ICD-10-CM

## 2022-08-05 DIAGNOSIS — R53.83 FATIGUE, UNSPECIFIED TYPE: ICD-10-CM

## 2022-08-05 DIAGNOSIS — I50.20 HFREF (HEART FAILURE WITH REDUCED EJECTION FRACTION): ICD-10-CM

## 2022-08-05 PROCEDURE — 93283 PRGRMG EVAL IMPLANTABLE DFB: CPT | Performed by: INTERNAL MEDICINE

## 2022-09-28 ENCOUNTER — TELEPHONE (OUTPATIENT)
Dept: CARDIOLOGY | Facility: CLINIC | Age: 62
End: 2022-09-28

## 2022-09-28 NOTE — TELEPHONE ENCOUNTER
Would like to participate in remote monitoring. Will need burner phone because she does not have a cell phone.

## 2022-10-28 ENCOUNTER — TELEPHONE (OUTPATIENT)
Dept: CARDIOLOGY | Facility: CLINIC | Age: 62
End: 2022-10-28

## 2022-12-22 ENCOUNTER — OFFICE VISIT (OUTPATIENT)
Dept: CARDIOLOGY | Facility: CLINIC | Age: 62
End: 2022-12-22

## 2022-12-22 VITALS
DIASTOLIC BLOOD PRESSURE: 74 MMHG | BODY MASS INDEX: 36.55 KG/M2 | HEIGHT: 62 IN | OXYGEN SATURATION: 95 % | SYSTOLIC BLOOD PRESSURE: 113 MMHG | HEART RATE: 83 BPM | WEIGHT: 198.6 LBS

## 2022-12-22 DIAGNOSIS — I50.22 CHRONIC SYSTOLIC CONGESTIVE HEART FAILURE: Primary | ICD-10-CM

## 2022-12-22 DIAGNOSIS — I47.29 NSVT (NONSUSTAINED VENTRICULAR TACHYCARDIA): ICD-10-CM

## 2022-12-22 DIAGNOSIS — I50.20 HFREF (HEART FAILURE WITH REDUCED EJECTION FRACTION): ICD-10-CM

## 2022-12-22 DIAGNOSIS — R06.02 SHORTNESS OF BREATH: ICD-10-CM

## 2022-12-22 DIAGNOSIS — I10 ESSENTIAL (PRIMARY) HYPERTENSION: ICD-10-CM

## 2022-12-22 DIAGNOSIS — I25.10 CORONARY ARTERY DISEASE INVOLVING NATIVE CORONARY ARTERY OF NATIVE HEART WITHOUT ANGINA PECTORIS: ICD-10-CM

## 2022-12-22 DIAGNOSIS — R07.2 PRECORDIAL PAIN: ICD-10-CM

## 2022-12-22 PROCEDURE — 99214 OFFICE O/P EST MOD 30 MIN: CPT | Performed by: NURSE PRACTITIONER

## 2022-12-22 RX ORDER — DAPAGLIFLOZIN 10 MG/1
10 TABLET, FILM COATED ORAL DAILY
Qty: 30 TABLET | Refills: 11 | Status: SHIPPED | OUTPATIENT
Start: 2022-12-22

## 2022-12-22 RX ORDER — UMECLIDINIUM 62.5 UG/1
AEROSOL, POWDER ORAL
COMMUNITY

## 2022-12-22 RX ORDER — ISOSORBIDE DINITRATE 10 MG/1
10 TABLET ORAL 2 TIMES DAILY
Qty: 60 TABLET | Refills: 11 | Status: SHIPPED | OUTPATIENT
Start: 2022-12-22 | End: 2023-03-01 | Stop reason: SINTOL

## 2022-12-22 RX ORDER — FLUTICASONE FUROATE AND VILANTEROL 100; 25 UG/1; UG/1
1 POWDER RESPIRATORY (INHALATION)
COMMUNITY

## 2022-12-22 RX ORDER — ISOSORBIDE DINITRATE 10 MG/1
10 TABLET ORAL 3 TIMES DAILY
Qty: 90 TABLET | Refills: 11 | Status: SHIPPED | OUTPATIENT
Start: 2022-12-22 | End: 2022-12-22

## 2022-12-22 NOTE — PROGRESS NOTES
Dexter Yates is a 62 y.o. female who presents to day for 3 month follow up , chronic hypotension, and hypokalemia.    CHIEF COMPLIANT  Chief Complaint   Patient presents with   • 3 month follow up    • chronic hypotension   • hypokalemia       Active Problems:  Problem List Items Addressed This Visit        Cardiac and Vasculature    Chest pain    Relevant Medications    isosorbide dinitrate (ISORDIL) 10 MG tablet    Other Relevant Orders    Adult Transthoracic Echo Complete W/ Cont if Necessary Per Protocol    Stress Test With Myocardial Perfusion One Day    Essential (primary) hypertension    Relevant Orders    Adult Transthoracic Echo Complete W/ Cont if Necessary Per Protocol    Stress Test With Myocardial Perfusion One Day    Coronary artery disease involving native coronary artery of native heart without angina pectoris    Relevant Medications    isosorbide dinitrate (ISORDIL) 10 MG tablet    Other Relevant Orders    Adult Transthoracic Echo Complete W/ Cont if Necessary Per Protocol    Stress Test With Myocardial Perfusion One Day    Systolic congestive heart failure (HCC) - Primary    Relevant Medications    isosorbide dinitrate (ISORDIL) 10 MG tablet    Other Relevant Orders    Adult Transthoracic Echo Complete W/ Cont if Necessary Per Protocol    Stress Test With Myocardial Perfusion One Day       Pulmonary and Pneumonias    Shortness of breath    Relevant Orders    Adult Transthoracic Echo Complete W/ Cont if Necessary Per Protocol    Stress Test With Myocardial Perfusion One Day   Other Visit Diagnoses     HFrEF (heart failure with reduced ejection fraction) (HCC)        Relevant Medications    isosorbide dinitrate (ISORDIL) 10 MG tablet    dapagliflozin Propanediol (Farxiga) 10 MG tablet    Other Relevant Orders    Adult Transthoracic Echo Complete W/ Cont if Necessary Per Protocol    Stress Test With Myocardial Perfusion One Day    NSVT (nonsustained ventricular tachycardia)         Relevant Medications    isosorbide dinitrate (ISORDIL) 10 MG tablet    Other Relevant Orders    Adult Transthoracic Echo Complete W/ Cont if Necessary Per Protocol    Stress Test With Myocardial Perfusion One Day      Problem List:  1. Coronary artery disease. Catheterization in 2009 at [facility] demonstrated 30%  circumflex disease with 50% posterolateral branch.   1.1 follow-up catheterization in September 2016 because of non-ST elevation myocardial infarction demonstrated a total occlusion of the right coronary artery with 90% high-grade LAD disease and 50-70% circumflex disease. Stenting of the LAD was performed. Medical management recommended and percutaneous intervention in the circumflex if patient fails medical therapy.  1.2 follow-up catheterization March 2017 with stenting of the circumflex. Patent stent to the LAD with chronic total occlusion of the right coronary artery. Medical management recommended  1.3 left heart catheterization 6/20: Left main normal, LAD 30% in-stent stenosis with left-to-right collaterals, circumflex patent stent, left to right collaterals, % occluded, EF 50 to 55%  1.4 Left heart Catheterization 11/24/21: LAD 95% in-stent stenosis with angioplasty that left minimal residual stenosis,  of the RCA that was unable to be crossed, 90% septal , EF less than 30%  2. Ischemic cardiomyopathy  1.1 echocardiogram 1/22: EF 25 to 30% dilated left and right atrium, no significant valvular disease  3. Hypertension.   4. Chronic tobacco habituation.   5. Dyslipidemia.   6.  Angioplasty to left lower extremity per Dr. White 8/2/17    MACY chaves, medical record number [id number] is a 62-year-old female patient who presents today for a follow-up. She is accompanied by her daughter.    When asked the patient confirms she has not been feeling well and has been seen at the emergency department and by her primary care provider. She maintains she was advised  that nothing can be done for her. She underwent a heart catheterization in 11/2021 and was advised against a bypass due to lung issues.  She then had a POBA to the pre-existing stent and the proximal LAD.  She is on high intensity statin therapy of atorvastatin dual antiplatelet therapy of aspirin and Plavix.  Due to hypotensive blood pressures she has been unable to tolerate antianginal therapy.  However since her last follow-up she has been able to tolerate the metoprolol and her blood pressure is 113/74 heart rate of 83 today.  She previously had an adverse reaction to Ranexa.    She recalls having an ICD placed due to her low ejection fraction of 25 to 30 percent. She reports her blood pressure has been well controlled and associates the control with her prescription for metoprolol. The patient reports on the evening 12/18/2022 she began to pain on her left side prior to her visit to the emergency department. She maintains she took a nitroglycerin pill around 1820 and reported to the ED for evaluation.  The patient reports on 12/19/2022 she had a blood panel done. She maintains that she has had blockages in her heart since the 1990's and her EF was 40 percent at that time. Her daughter inquired on the times of the day the patient can take her medications.    She reports having pain in the area her ICD was placed and was advised it can take up to several months for the pain to subside. the adult female reports that the device was checked and was functioning normally.     When asked the patient reports she has been having shortness of breath since her infection with COVID-19. She reports having to supplement her oxygen more often and requires it for activity at home. She maintains that she can have shortness of breath at rest and with activity and she uses 2L of O2.  Her shortness of breath does occur with rest and activity.    She does experience some level dizziness when she changes positions or moves her head  too quickly.  She also reports fatigue where she is extremely tired all the time.    She denies lower extremity edema, palpitations, syncope, or other strokelike symptoms.      PRIOR MEDS  Current Outpatient Medications on File Prior to Visit   Medication Sig Dispense Refill   • aspirin 81 MG EC tablet TAKE ONE TABLET BY MOUTH DAILY 30 tablet 11   • atorvastatin (LIPITOR) 40 MG tablet Take 40 mg by mouth every night at bedtime.     • bumetanide (BUMEX) 2 MG tablet Take 1 tablet by mouth Daily. (Patient taking differently: Take 2 mg by mouth 2 (Two) Times a Day. Increased by PCP) 90 tablet 3   • citalopram (CeleXA) 40 MG tablet Take 40 mg by mouth Daily.  1   • clopidogrel (PLAVIX) 75 MG tablet TAKE 1 TABLET BY MOUTH DAILY. 90 tablet 3   • docusate sodium (COLACE) 250 MG capsule Take 250 mg by mouth Daily.     • fenofibrate (TRICOR) 145 MG tablet Take 145 mg by mouth Daily.  5   • ferrous sulfate 325 (65 FE) MG tablet Take 325 mg by mouth Daily With Breakfast.     • Fluticasone Furoate-Vilanterol (Breo Ellipta) 100-25 MCG/INH inhaler Inhale 1 puff Daily.     • Fluticasone Furoate-Vilanterol 100-25 MCG/ACT aerosol powder  Inhale 1 puff Daily.     • gabapentin (NEURONTIN) 800 MG tablet Take 800 mg by mouth 4 (Four) Times a Day.     • Insulin Glargine (BASAGLAR KWIKPEN SC) Inject 30 Units under the skin into the appropriate area as directed Every Night.     • metoprolol succinate XL (TOPROL-XL) 25 MG 24 hr tablet Take 25 mg by mouth Daily.     • nitroglycerin (Nitrostat) 0.4 MG SL tablet Place 1 tablet under the tongue Every 5 (Five) Minutes As Needed for Chest Pain. 25 tablet 3   • NovoLOG FlexPen 100 UNIT/ML solution pen-injector sc pen      • pantoprazole (PROTONIX) 40 MG EC tablet Take 40 mg by mouth Every Morning Before Breakfast.     • potassium chloride 10 MEQ CR tablet Take 1 tablet by mouth Daily. (Patient taking differently: Take 20 mEq by mouth Daily.) 30 tablet 11   • simethicone (MYLICON) 80 MG chewable  tablet Chew 80 mg Every 6 (Six) Hours As Needed for Flatulence.     • Umeclidinium Bromide (Incruse Ellipta) 62.5 MCG/ACT aerosol powder  Inhale.       No current facility-administered medications on file prior to visit.       ALLERGIES  Drug [acetaminophen-codeine], Hydrocodone, and Midodrine    HISTORY  Past Medical History:   Diagnosis Date   • CAD (coronary artery disease)     Catheterization in  at [facility] demonstrated 30% circumflex disease with 50% posterolateral branch.    • Chest pain    • COPD (chronic obstructive pulmonary disease) (HCC)    • Depression    • Diabetes mellitus ([facility])     checks bs at home daily usually; cannot always follow a diabetic diet she states due to cost of food   • Dyslipidemia    • Fatigue    • Hiatal hernia    • Hyperlipidemia    • Hypertension    • Myocardial infarction (HCC)    • No natural teeth    • Peripheral vascular disease (HCC)    • Shortness of breath    • Tobacco use     smokes 2 packs a week   • Wears glasses        Social History     Socioeconomic History   • Marital status:    Tobacco Use   • Smoking status: Former     Packs/day: 0.50     Years: 40.00     Pack years: 20.00     Types: Cigarettes     Quit date: 2021     Years since quittin.3   • Smokeless tobacco: Never   • Tobacco comments:         Substance and Sexual Activity   • Alcohol use: No   • Drug use: No   • Sexual activity: Defer       Family History   Problem Relation Age of Onset   • Hypertension Mother    • Hyperlipidemia Mother    • Skin cancer Mother    • No Known Problems Father        Review of Systems   Constitutional: Positive for fatigue. Negative for chills and fever.   HENT: Positive for rhinorrhea. Negative for congestion and sore throat.    Respiratory: Positive for shortness of breath. Negative for chest tightness.    Cardiovascular: Positive for chest pain (sharp at times dull other times has been to ER once PCP twice ). Negative for palpitations and leg swelling.  "  Gastrointestinal: Positive for constipation. Negative for diarrhea and nausea.   Musculoskeletal: Positive for neck pain (pain with chest pain between shoulder blades). Negative for arthralgias and back pain.   Allergic/Immunologic: Negative for environmental allergies and food allergies.   Neurological: Positive for dizziness (getting up ), weakness and light-headedness. Negative for syncope.   Hematological: Bruises/bleeds easily.   Psychiatric/Behavioral: Positive for sleep disturbance (sleeps with O2 ).       Objective     VITALS: /74 (BP Location: Left arm, Patient Position: Sitting, Cuff Size: Adult)   Pulse 83   Ht 157.5 cm (62.01\")   Wt 90.1 kg (198 lb 9.6 oz)   SpO2 95%   BMI 36.32 kg/m²     LABS:   Lab Results (most recent)     None          IMAGING:   No Images in the past 120 days found..    EXAM:  Physical Exam    Procedure   Procedures       Assessment & Plan        .DAXSCRIBEANDPROVIDERSTATEMENT (Barrera Valdez)     Diagnosis Plan   1. Chronic systolic congestive heart failure (HCC)  Adult Transthoracic Echo Complete W/ Cont if Necessary Per Protocol    Stress Test With Myocardial Perfusion One Day      2. HFrEF (heart failure with reduced ejection fraction) (HCC)  Adult Transthoracic Echo Complete W/ Cont if Necessary Per Protocol    Stress Test With Myocardial Perfusion One Day    dapagliflozin Propanediol (Farxiga) 10 MG tablet      3. Shortness of breath  Adult Transthoracic Echo Complete W/ Cont if Necessary Per Protocol    Stress Test With Myocardial Perfusion One Day      4. Coronary artery disease involving native coronary artery of native heart without angina pectoris  Adult Transthoracic Echo Complete W/ Cont if Necessary Per Protocol    Stress Test With Myocardial Perfusion One Day      5. NSVT (nonsustained ventricular tachycardia)  Adult Transthoracic Echo Complete W/ Cont if Necessary Per Protocol    Stress Test With Myocardial Perfusion One Day      6. Essential (primary) " hypertension  Adult Transthoracic Echo Complete W/ Cont if Necessary Per Protocol    Stress Test With Myocardial Perfusion One Day      7. Precordial pain  Adult Transthoracic Echo Complete W/ Cont if Necessary Per Protocol    Stress Test With Myocardial Perfusion One Day    isosorbide dinitrate (ISORDIL) 10 MG tablet      1.  She does have a history of extensive coronary artery disease in which she went under left heart cath and was found to have 95 percent stent stenosis.  At that time she was recommended to have bypass surgery but however due to her pulmonary issues it was felt like she would not do very well with such an extensive surgery.  Then she went under left heart catheterization by Dr. Almeida and had angioplasty that left minimal residual stenosis of the LAD. She had a  of the RCA that was unable to be crossed, 90 percent septal . Her ejection fraction was less than 30 percent at that time.  We will try to add isosorbide 10 mg twice daily to see if we can help decrease her angina.  However she was at advised to monitor blood pressure closely due to her history of hypotension.  She will continue all other medications as well.  2.  Patient does have a history of systolic heart failure with ICD in place.  Guideline driven medication therapy for heart failure has been difficult due to her chronic hypotension.  She has been tolerating beta-blocker therapy of metoprolol succinate in which today her blood pressure is 113/74 heart rate of 83.  We will add Jardiance 10 mg daily to further guideline driven therapy.  3. The patient's blood panel from 12/19/2022 demonstrated her blood glucose was recorded at 526 mg/dL, her potassium was 4.2, WBC was normal, blood count was normal, platelet was normal and her sodium was low due to elevated blood glucose. Her chloride was 90 and creatinine was 1.62.  4.. Ordered a stress test and echocardiogram to evaluate the patient's current heart function.  We  discussed that if there was ischemia and the anterior or posterior areas of the heart we may be able to have her undergo percutaneous coronary intervention.  However her  of her RCA and 90% of the septal  were not able to be intervened on previously and unlikely would be able to be revascularized in the future.  She verbalized understanding.  If she does have anterior posterior wall ischemia on her stress test will send back for further evaluation and potential intervention with a left heart catheterization.  5.She is on medication therapy of atorvastatin, dual antiplatelet therapy, of aspirin, and Plavix, fenofibrate,. She is on metoprolol 25 mg for antianginal therapy as well as rate control. She is also on Bumex for diuretic therapy. This has recently been increased by her PCP to twice daily. Ordered a prescription for Isordil and Farxiga 10 mg.  6.  Informed of signs and symptoms of ACS and advised to seek emergent treatment for any new worsening symptoms.  Patient also advised sooner follow-up as needed.  Also advised to follow-up with family doctor as needed  This note is dictated utilizing voice recognition software.  Although this record has been proof read, transcriptional errors may still be present. If questions occur regarding the content of this record please do not hesitate to call our office.  I have reviewed and confirmed the accuracy of the ROS as documented by the MA/LPN/RN ANNE Parry    Return if symptoms worsen or fail to improve.    Diagnoses and all orders for this visit:    1. Chronic systolic congestive heart failure (HCC) (Primary)  -     Adult Transthoracic Echo Complete W/ Cont if Necessary Per Protocol; Future  -     Stress Test With Myocardial Perfusion One Day; Future    2. HFrEF (heart failure with reduced ejection fraction) (HCC)  -     Adult Transthoracic Echo Complete W/ Cont if Necessary Per Protocol; Future  -     Stress Test With Myocardial Perfusion One  Day; Future  -     dapagliflozin Propanediol (Farxiga) 10 MG tablet; Take 10 mg by mouth Daily.  Dispense: 30 tablet; Refill: 11    3. Shortness of breath  -     Adult Transthoracic Echo Complete W/ Cont if Necessary Per Protocol; Future  -     Stress Test With Myocardial Perfusion One Day; Future    4. Coronary artery disease involving native coronary artery of native heart without angina pectoris  -     Adult Transthoracic Echo Complete W/ Cont if Necessary Per Protocol; Future  -     Stress Test With Myocardial Perfusion One Day; Future    5. NSVT (nonsustained ventricular tachycardia)  -     Adult Transthoracic Echo Complete W/ Cont if Necessary Per Protocol; Future  -     Stress Test With Myocardial Perfusion One Day; Future    6. Essential (primary) hypertension  -     Adult Transthoracic Echo Complete W/ Cont if Necessary Per Protocol; Future  -     Stress Test With Myocardial Perfusion One Day; Future    7. Precordial pain  -     Discontinue: isosorbide dinitrate (ISORDIL) 10 MG tablet; Take 1 tablet by mouth 3 (Three) Times a Day.  Dispense: 90 tablet; Refill: 11  -     Adult Transthoracic Echo Complete W/ Cont if Necessary Per Protocol; Future  -     Stress Test With Myocardial Perfusion One Day; Future  -     isosorbide dinitrate (ISORDIL) 10 MG tablet; Take 1 tablet by mouth 2 (Two) Times a Day.  Dispense: 60 tablet; Refill: 11        Maeve A Page  reports that she quit smoking about 16 months ago. Her smoking use included cigarettes. She has a 20.00 pack-year smoking history. She has never used smokeless tobacco.. I have educated her on the risk of diseases from using tobacco products.      MEDS ORDERED DURING VISIT:  New Medications Ordered This Visit   Medications   • isosorbide dinitrate (ISORDIL) 10 MG tablet     Sig: Take 1 tablet by mouth 2 (Two) Times a Day.     Dispense:  60 tablet     Refill:  11   • dapagliflozin Propanediol (Farxiga) 10 MG tablet     Sig: Take 10 mg by mouth Daily.      Dispense:  30 tablet     Refill:  11           This document has been electronically signed by Marshall Oquendo Jr., APRN  December 23, 2022 09:34 EST

## 2023-02-21 ENCOUNTER — HOSPITAL ENCOUNTER (OUTPATIENT)
Dept: CARDIOLOGY | Facility: HOSPITAL | Age: 63
Discharge: HOME | End: 2023-02-21
Payer: MEDICARE

## 2023-02-21 DIAGNOSIS — R07.2 PRECORDIAL PAIN: ICD-10-CM

## 2023-02-21 DIAGNOSIS — I50.22 CHRONIC SYSTOLIC CONGESTIVE HEART FAILURE: ICD-10-CM

## 2023-02-21 DIAGNOSIS — I25.10 CORONARY ARTERY DISEASE INVOLVING NATIVE CORONARY ARTERY OF NATIVE HEART WITHOUT ANGINA PECTORIS: ICD-10-CM

## 2023-02-21 DIAGNOSIS — I10 ESSENTIAL (PRIMARY) HYPERTENSION: ICD-10-CM

## 2023-02-21 DIAGNOSIS — I50.20 HFREF (HEART FAILURE WITH REDUCED EJECTION FRACTION): ICD-10-CM

## 2023-02-21 DIAGNOSIS — R06.02 SHORTNESS OF BREATH: ICD-10-CM

## 2023-02-21 DIAGNOSIS — I47.29 NSVT (NONSUSTAINED VENTRICULAR TACHYCARDIA): ICD-10-CM

## 2023-02-21 LAB
BH CV REST NUCLEAR ISOTOPE DOSE: 10 MCI
BH CV STRESS COMMENTS STAGE 1: NORMAL
BH CV STRESS DOSE REGADENOSON STAGE 1: 0.4
BH CV STRESS DURATION MIN STAGE 1: 0
BH CV STRESS DURATION SEC STAGE 1: 10
BH CV STRESS NUCLEAR ISOTOPE DOSE: 30 MCI
BH CV STRESS PROTOCOL 1: NORMAL
BH CV STRESS RECOVERY BP: NORMAL MMHG
BH CV STRESS RECOVERY HR: 83 BPM
BH CV STRESS STAGE 1: 1
MAXIMAL PREDICTED HEART RATE: 159 BPM
PERCENT MAX PREDICTED HR: 53.46 %
STRESS BASELINE BP: NORMAL MMHG
STRESS BASELINE HR: 79 BPM
STRESS PERCENT HR: 63 %
STRESS POST PEAK BP: NORMAL MMHG
STRESS POST PEAK HR: 85 BPM
STRESS TARGET HR: 135 BPM

## 2023-02-21 PROCEDURE — 78452 HT MUSCLE IMAGE SPECT MULT: CPT | Performed by: INTERNAL MEDICINE

## 2023-02-21 PROCEDURE — 78452 HT MUSCLE IMAGE SPECT MULT: CPT

## 2023-02-21 PROCEDURE — 0 TECHNETIUM SESTAMIBI: Performed by: INTERNAL MEDICINE

## 2023-02-21 PROCEDURE — 93306 TTE W/DOPPLER COMPLETE: CPT | Performed by: INTERNAL MEDICINE

## 2023-02-21 PROCEDURE — 25010000002 REGADENOSON 0.4 MG/5ML SOLUTION: Performed by: INTERNAL MEDICINE

## 2023-02-21 PROCEDURE — 93017 CV STRESS TEST TRACING ONLY: CPT

## 2023-02-21 PROCEDURE — A9500 TC99M SESTAMIBI: HCPCS | Performed by: INTERNAL MEDICINE

## 2023-02-21 PROCEDURE — 93306 TTE W/DOPPLER COMPLETE: CPT

## 2023-02-21 PROCEDURE — 93018 CV STRESS TEST I&R ONLY: CPT | Performed by: INTERNAL MEDICINE

## 2023-02-21 RX ADMIN — TECHNETIUM TC 99M SESTAMIBI 1 DOSE: 1 INJECTION INTRAVENOUS at 08:11

## 2023-02-21 RX ADMIN — TECHNETIUM TC 99M SESTAMIBI 1 DOSE: 1 INJECTION INTRAVENOUS at 10:30

## 2023-02-21 RX ADMIN — REGADENOSON 0.4 MG: 0.08 INJECTION, SOLUTION INTRAVENOUS at 10:30

## 2023-02-22 ENCOUNTER — TELEPHONE (OUTPATIENT)
Dept: CARDIOLOGY | Facility: CLINIC | Age: 63
End: 2023-02-22
Payer: MEDICAID

## 2023-02-22 LAB
BH CV ECHO MEAS - ACS: 1.8 CM
BH CV ECHO MEAS - AO MAX PG: 8 MMHG
BH CV ECHO MEAS - AO MEAN PG: 4.8 MMHG
BH CV ECHO MEAS - AO ROOT DIAM: 2.6 CM
BH CV ECHO MEAS - AO V2 MAX: 141.2 CM/SEC
BH CV ECHO MEAS - AO V2 VTI: 34.6 CM
BH CV ECHO MEAS - EDV(CUBED): 126.5 ML
BH CV ECHO MEAS - EDV(MOD-SP4): 94.4 ML
BH CV ECHO MEAS - EF(MOD-SP4): 37.4 %
BH CV ECHO MEAS - EF_3D-VOL: 51 %
BH CV ECHO MEAS - ESV(CUBED): 68.9 ML
BH CV ECHO MEAS - ESV(MOD-SP4): 59.1 ML
BH CV ECHO MEAS - FS: 18.3 %
BH CV ECHO MEAS - IVS/LVPW: 1.06 CM
BH CV ECHO MEAS - IVSD: 1.08 CM
BH CV ECHO MEAS - LA DIMENSION: 4.3 CM
BH CV ECHO MEAS - LAT PEAK E' VEL: 4.6 CM/SEC
BH CV ECHO MEAS - LV DIASTOLIC VOL/BSA (35-75): 49.6 CM2
BH CV ECHO MEAS - LV MASS(C)D: 195.7 GRAMS
BH CV ECHO MEAS - LV SYSTOLIC VOL/BSA (12-30): 31.1 CM2
BH CV ECHO MEAS - LVIDD: 5 CM
BH CV ECHO MEAS - LVIDS: 4.1 CM
BH CV ECHO MEAS - LVOT AREA: 3 CM2
BH CV ECHO MEAS - LVOT DIAM: 1.97 CM
BH CV ECHO MEAS - LVPWD: 1.02 CM
BH CV ECHO MEAS - MED PEAK E' VEL: 3.9 CM/SEC
BH CV ECHO MEAS - MV A MAX VEL: 123.8 CM/SEC
BH CV ECHO MEAS - MV DEC SLOPE: 446.2 CM/SEC2
BH CV ECHO MEAS - MV E MAX VEL: 69.8 CM/SEC
BH CV ECHO MEAS - MV E/A: 0.56
BH CV ECHO MEAS - RVDD: 2.8 CM
BH CV ECHO MEAS - SI(MOD-SP4): 18.5 ML/M2
BH CV ECHO MEAS - SV(MOD-SP4): 35.3 ML
BH CV ECHO MEASUREMENTS AVERAGE E/E' RATIO: 16.42
LEFT ATRIUM VOLUME INDEX: 15.8 ML/M2
MAXIMAL PREDICTED HEART RATE: 159 BPM
STRESS TARGET HR: 135 BPM

## 2023-02-22 NOTE — TELEPHONE ENCOUNTER
----- Message from ANNE Parry sent at 2/22/2023  8:20 AM EST -----  Positive stress test.  1 week follow-up.    I called and spoke with Amy who is listed on [facility] advised of positive stress and need for 1 week apt.        Solange Leong A

## 2023-02-24 NOTE — TELEPHONE ENCOUNTER
Patient has EF of 36-40 %n on Echo has a apt on 3/1/23 to go over abnormal stress will discuss these results at that time per JR. Solange BHAKTA

## 2023-02-24 NOTE — PROGRESS NOTES
Patient did have a positive stress test well with reduced ejection fraction.  We will discuss this when she comes in on the first.

## 2023-03-01 ENCOUNTER — OFFICE VISIT (OUTPATIENT)
Dept: CARDIOLOGY | Facility: CLINIC | Age: 63
End: 2023-03-01
Payer: MEDICARE

## 2023-03-01 VITALS
HEART RATE: 77 BPM | WEIGHT: 196.2 LBS | DIASTOLIC BLOOD PRESSURE: 65 MMHG | HEIGHT: 62 IN | OXYGEN SATURATION: 93 % | SYSTOLIC BLOOD PRESSURE: 99 MMHG | BODY MASS INDEX: 36.1 KG/M2

## 2023-03-01 DIAGNOSIS — N28.9 RENAL INSUFFICIENCY: ICD-10-CM

## 2023-03-01 DIAGNOSIS — I50.20 HFREF (HEART FAILURE WITH REDUCED EJECTION FRACTION): ICD-10-CM

## 2023-03-01 DIAGNOSIS — I95.89 CHRONIC HYPOTENSION: ICD-10-CM

## 2023-03-01 DIAGNOSIS — I10 ESSENTIAL (PRIMARY) HYPERTENSION: ICD-10-CM

## 2023-03-01 DIAGNOSIS — R94.39 POSITIVE CARDIAC STRESS TEST: ICD-10-CM

## 2023-03-01 DIAGNOSIS — I47.29 NSVT (NONSUSTAINED VENTRICULAR TACHYCARDIA): ICD-10-CM

## 2023-03-01 DIAGNOSIS — I50.22 CHRONIC SYSTOLIC CONGESTIVE HEART FAILURE: Primary | ICD-10-CM

## 2023-03-01 DIAGNOSIS — I25.10 CORONARY ARTERY DISEASE INVOLVING NATIVE CORONARY ARTERY OF NATIVE HEART WITHOUT ANGINA PECTORIS: ICD-10-CM

## 2023-03-01 DIAGNOSIS — R06.02 SHORTNESS OF BREATH: ICD-10-CM

## 2023-03-01 PROCEDURE — 99214 OFFICE O/P EST MOD 30 MIN: CPT | Performed by: NURSE PRACTITIONER

## 2023-03-01 NOTE — PROGRESS NOTES
Subjective     Maeve Yates is a 62 y.o. female who presents to day for ABN STRESS (ABN STRESS F/U/).    CHIEF COMPLIANT  Chief Complaint   Patient presents with   • ABN STRESS     ABN STRESS F/U         Active Problems:  1. Coronary artery disease. Catheterization in 2009 at [facility] demonstrated 30%  circumflex disease with 50% posterolateral branch.   1.1 follow-up catheterization in September 2016 because of non-ST elevation myocardial infarction demonstrated a total occlusion of the right coronary artery with 90% high-grade LAD disease and 50-70% circumflex disease. Stenting of the LAD was performed. Medical management recommended and percutaneous intervention in the circumflex if patient fails medical therapy.  1.2LHC 3/17: with stenting of the circumflex. Patent stent to the LAD with chronic total occlusion of the right coronary artery. Medical management recommended  1.3 left heart catheterization 6/20: Left main normal, LAD 30% in-stent stenosis with left-to-right collaterals, circumflex patent stent, left to right collaterals, % occluded, EF 50 to 55%  1.4 Left heart Catheterization 11/24/21: LAD 95% in-stent stenosis with angioplasty that left minimal residual stenosis,  of the RCA that was unable to be crossed, 90% septal , EF less than 30%  1.5 stress test 2/23: Extensive anterior, anterior apical, apical, and lateral infarct with mild karen-infarct ischemia.  Additionally there is a moderate size moderately dense reversible defect involving the inferior wall and inferior lateral septal wall compatible with ischemia.  Post stress ejection fraction 31% with anterior hypokinesis septal and inferior wall hypokinesis and distal anterior lateral and apical akinesis to dyskinesis  2. Ischemic cardiomyopathy  1.1 echocardiogram 1/22: EF 25 to 30% dilated left and right atrium, no significant valvular disease  1.2 echocardiogram 2/23: EF 36 to 40%, grade 1A diastolic dysfunction,  trivial AI and MR  3. Hypertension.   4. Chronic tobacco habituation.   5. Dyslipidemia.   6.  Angioplasty to left lower extremity per Dr. White 8/2/17     Problem List Items Addressed This Visit        Cardiac and Vasculature    Essential (primary) hypertension    Coronary artery disease involving native coronary artery of native heart without angina pectoris    Systolic congestive heart failure (HCC) - Primary       Pulmonary and Pneumonias    Shortness of breath   Other Visit Diagnoses     HFrEF (heart failure with reduced ejection fraction) (HCC)        NSVT (nonsustained ventricular tachycardia)        Chronic hypotension        Positive cardiac stress test        Renal insufficiency        Relevant Orders    Ambulatory Referral to Nephrology          [facility]  HPI    Maeve Yates is a 62-year-old female patient being followed up today for heart failure with reduced ejection fraction and coronary artery disease. Patient does have a history of heart failure with reduced ejection fraction in which she did go under a recent echocardiogram on 02/21/2023 that showed an ejection fraction of 36 to 40 percent, which is improved from 25 to 30 percent with grade 1A diastolic dysfunction. She also had trivial MR trivial AI. She also went under a stress test that indicated an anterior apical and lateral wall infarct with mild karen-infarct ischemia. Additionally, there is a moderately sized, moderately density defect involving the inferior wall and inferolateral septal wall compatible with ischemia with a severely depressed post stress ejection fraction of 31 percent with anterior hypokinesis, septal and inferior wall hypokinesis and distal anterolateral and apical akinesis to dyskinesis.  The patient's blood pressure is low today at 99/65 mmHg. She states her blood pressure has been well controlled. The patient is accompanied by an adult male.  Patient is currently on metoprolol.    The patient states she has been  experiencing constant left lateral chest pain. She describes the pain as dull, and lasting all day. The patient states she does not want to go to the hospital.  She does have associated shortness of breath.  She has taken nitroglycerin sublingually and it has helped.The patient states she discontinued isosorbide because it caused her to have headaches. She reports she has been experiencing left jaw pain. She cites she will roll around and lay on it and it will resolve. She reports the providers in the hospital discontinued her on Ranexa, due to her kidney functions.    She states she has been experiencing dyspnea, which mainly occurs when she gets up to do something. The patient states she can not lay flat without having to wear her oxygen. She states lately she has been having to wear her oxygen a lot. She added when she has angina, she gets dyspnea as well.    She states she has been experiencing palpitations daily. She states it does not cause her any other problems unless it is painful. The patient states she will take nitroglycerin, and she has been taking them more often.    The patient is seeing Dr. Rajeev Mishra, he increased her Bumex to 2 mg twice daily. She stated the lab work Dr. Boo ordered on 12/2022, read she had multiple myocardial infarction.    The patient's ejection fraction has improved from 25 to 30 percent to 35 to 40 percent with limited medication management on most recent echocardiogram. Echocardiogram performed on 02/21/2023 showed an ejection fraction of 36 to 40 percent, which is improved from 25 to 30 percent with grade 1 diastolic dysfunction. She also had trivial MR trivial AI.    Stress test performed on 02/21/2023 indicated an anterior apical and lateral wall infarct with mild karen-infarct ischemia. There is a moderately sized, moderately density defect involving the inferior wall and inferolateral septal wall compatible with ischemia with a severely depressed post stress ejection  fraction of 31 percent with anterior hypokinesis, septal and inferior wall hypokinesis and distal anterolateral and apical akinesis.    Lab work from 12/2022 showed Brain Natriuretic Peptide was 157 mg/dL. Her electrolytes were within normal limits. Her glucose was 478 mg/dL. Her creatinine was elevated to 2.12 mg/dL. Her sodium was slightly low. Her potassium was normal. Her chloride was abnormal as well. Her liver function was normal. Her white blood cell count was normal. Her blood volume was normal. Her troponin was elevated.    Her last heart catheterization was in 2021. Her left anterior descending artery had 95 percent in-stent. Had an angioplasty, left minimum residual. The RCA was 100 percent blocked. A septal  was 90 percent. Her ejection fraction was 30 percent. A stress test was repeated, and revealed myocardial infarction. There was a moderately sized dense reversible defect involving the inferolateral septal wall compatible with ischemia.        PRIOR MEDS  Current Outpatient Medications on File Prior to Visit   Medication Sig Dispense Refill   • aspirin 81 MG EC tablet TAKE ONE TABLET BY MOUTH DAILY 30 tablet 11   • atorvastatin (LIPITOR) 40 MG tablet Take 1 tablet by mouth every night at bedtime.     • bumetanide (BUMEX) 2 MG tablet Take 1 tablet by mouth Daily. (Patient taking differently: Take 1 tablet by mouth 2 (Two) Times a Day. Increased by PCP) 90 tablet 3   • citalopram (CeleXA) 40 MG tablet Take 1 tablet by mouth Daily.  1   • clopidogrel (PLAVIX) 75 MG tablet TAKE 1 TABLET BY MOUTH DAILY. 90 tablet 3   • dapagliflozin Propanediol (Farxiga) 10 MG tablet Take 10 mg by mouth Daily. 30 tablet 11   • docusate sodium (COLACE) 250 MG capsule Take 1 capsule by mouth Daily.     • fenofibrate (TRICOR) 145 MG tablet Take 1 tablet by mouth Daily.  5   • ferrous sulfate 325 (65 FE) MG tablet Take 1 tablet by mouth Daily With Breakfast.     • Fluticasone Furoate-Vilanterol (Breo Ellipta) 100-25  MCG/INH inhaler Inhale 1 puff Daily.     • Fluticasone Furoate-Vilanterol 100-25 MCG/ACT aerosol powder  Inhale 1 puff Daily.     • gabapentin (NEURONTIN) 800 MG tablet Take 1 tablet by mouth 4 (Four) Times a Day.     • Insulin Glargine (BASAGLAR KWIKPEN SC) Inject 30 Units under the skin into the appropriate area as directed Every Night.     • metoprolol succinate XL (TOPROL-XL) 25 MG 24 hr tablet Take 1 tablet by mouth Daily.     • nitroglycerin (Nitrostat) 0.4 MG SL tablet Place 1 tablet under the tongue Every 5 (Five) Minutes As Needed for Chest Pain. 25 tablet 3   • NovoLOG FlexPen 100 UNIT/ML solution pen-injector sc pen      • pantoprazole (PROTONIX) 40 MG EC tablet Take 1 tablet by mouth Every Morning Before Breakfast.     • potassium chloride 10 MEQ CR tablet Take 1 tablet by mouth Daily. (Patient taking differently: Take 2 tablets by mouth Daily.) 30 tablet 11   • simethicone (MYLICON) 80 MG chewable tablet Chew 1 tablet Every 6 (Six) Hours As Needed for Flatulence.     • Umeclidinium Bromide (Incruse Ellipta) 62.5 MCG/ACT aerosol powder  Inhale.     • [DISCONTINUED] isosorbide dinitrate (ISORDIL) 10 MG tablet Take 1 tablet by mouth 2 (Two) Times a Day. 60 tablet 11     No current facility-administered medications on file prior to visit.       ALLERGIES  Drug [acetaminophen-codeine], Hydrocodone, and Midodrine    HISTORY  Past Medical History:   Diagnosis Date   • CAD (coronary artery disease)     Catheterization in 2009 at [facility] demonstrated 30% circumflex disease with 50% posterolateral branch.    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    • Peripheral vascular disease (HCC)    •  "Shortness of breath    • Tobacco use     smokes 2 packs a week   • Wears glasses        Social History     Socioeconomic History   • Marital status:    Tobacco Use   • Smoking status: Former     Packs/day: 0.50     Years: 40.00     Pack years: 20.00     Types: Cigarettes     Quit date: 2021     Years since quittin.5   • Smokeless tobacco: Never   • Tobacco comments:         Substance and Sexual Activity   • Alcohol use: No   • Drug use: No   • Sexual activity: Defer       Family History   Problem Relation Age of Onset   • Hypertension Mother    • Hyperlipidemia Mother    • Skin cancer Mother    • No Known Problems Father        Review of Systems   Constitutional: Negative for chills and fever.   HENT: Negative.  Negative for dental problem, drooling, ear discharge, ear pain, facial swelling, rhinorrhea, sinus pressure, sinus pain, sneezing, sore throat and tinnitus.    Eyes: Negative for pain, redness, itching and visual disturbance.   Respiratory: Positive for shortness of breath and wheezing. Negative for cough and choking.    Cardiovascular: Positive for chest pain and palpitations. Negative for leg swelling.   Gastrointestinal: Negative for blood in stool, constipation, diarrhea and nausea.   Genitourinary: Negative.  Negative for difficulty urinating.   Skin: Negative for rash and wound.   Neurological: Positive for dizziness, weakness and numbness.   Psychiatric/Behavioral: Positive for sleep disturbance.       Objective     VITALS: BP 99/65   Pulse 77   Ht 157.5 cm (62.01\")   Wt 89 kg (196 lb 3.2 oz)   SpO2 93%   BMI 35.88 kg/m²     LABS:   Lab Results (most recent)     None          IMAGING:   No Images in the past 120 days found..    EXAM:  Physical Exam  Vitals and nursing note reviewed.   Constitutional:       Appearance: She is well-developed.   HENT:      Head: Normocephalic.   Eyes:      Pupils: Pupils are equal, round, and reactive to light.   Neck:      Thyroid: No thyroid mass. "      Vascular: No carotid bruit or JVD.      Trachea: Trachea and phonation normal.   Cardiovascular:      Rate and Rhythm: Normal rate and regular rhythm.      Pulses:           Radial pulses are 2+ on the right side and 2+ on the left side.        Posterior tibial pulses are 2+ on the right side and 2+ on the left side.      Heart sounds: Normal heart sounds. No murmur heard.    No friction rub. No gallop.   Pulmonary:      Effort: Pulmonary effort is normal. No respiratory distress.      Breath sounds: Decreased air movement present. Decreased breath sounds present. No wheezing or rales.   Abdominal:      General: Bowel sounds are normal.      Palpations: Abdomen is soft.   Musculoskeletal:         General: Swelling (TRACE) present. Normal range of motion.      Cervical back: Neck supple.   Skin:     General: Skin is warm and dry.      Capillary Refill: Capillary refill takes less than 2 seconds.      Findings: No rash.   Neurological:      Mental Status: She is alert and oriented to person, place, and time.   Psychiatric:         Speech: Speech normal.         Behavior: Behavior normal.         Thought Content: Thought content normal.         Judgment: Judgment normal.         Procedure   Procedures       Assessment & Plan    Diagnosis Plan   1. Chronic systolic congestive heart failure (HCC)        2. HFrEF (heart failure with reduced ejection fraction) (HCC)        3. Shortness of breath        4. Coronary artery disease involving native coronary artery of native heart without angina pectoris        5. NSVT (nonsustained ventricular tachycardia)        6. Essential (primary) hypertension        7. Chronic hypotension        8. Positive cardiac stress test        9. Renal insufficiency  Ambulatory Referral to Nephrology      PLAN  Cardiac health maintenance  1. The patient's echocardiogram and stress test results were discussed in full detail with the patient.  Patient was informed of positive for ischemia but  improvement in her ejection fraction on her echocardiogram.  She does continue to have symptoms of chest pain, shortness of breath.  Due to her renal insufficiency a joint decision was to have her evaluated by nephrology for restratification prior to her moving forth with repeat left heart catheterization.  We will refer her to nephrology.  2. The patient was advised on the need for a cardiac catheterization. Risk and benefits were discussed with the patient.  After nephrology evaluation we will move forth with left heart catheterization based on their recommendations.  3.  Patient does have heart failure with reduced ejection fraction which has been extremely hard to maximize medication management due to hypotension.  She is currently on metoprolol succinate and Farxiga and is not able to tolerate ACE or ARB therapy due to hypotension.  4.  Informed of signs and symptoms of ACS and advised to seek emergent treatment for any new worsening symptoms.  Patient also advised sooner follow-up as needed.  Also advised to follow-up with family doctor as needed  This note is dictated utilizing voice recognition software.  Although this record has been proof read, transcriptional errors may still be present. If questions occur regarding the content of this record please do not hesitate to call our office.  I have reviewed and confirmed the accuracy of the ROS as documented by the MA/LPN/RN ANNE Parry    Return in about 3 months (around 6/1/2023), or if symptoms worsen or fail to improve.    Diagnoses and all orders for this visit:    1. Chronic systolic congestive heart failure (HCC) (Primary)    2. HFrEF (heart failure with reduced ejection fraction) (HCC)    3. Shortness of breath    4. Coronary artery disease involving native coronary artery of native heart without angina pectoris    5. NSVT (nonsustained ventricular tachycardia)    6. Essential (primary) hypertension    7. Chronic hypotension    8. Positive  cardiac stress test    9. Renal insufficiency  -     Ambulatory Referral to Nephrology        Assessment  1. Heart failure with reduced ejection fraction  2. Coronary artery disease  3. Left lateral chest pain      Maeve A Page  reports that she quit smoking about 18 months ago. Her smoking use included cigarettes. She has a 20.00 pack-year smoking history. She has never used smokeless tobacco..                      MEDS ORDERED DURING VISIT:  No orders of the defined types were placed in this encounter.          This document has been electronically signed by ANNE Parry Jr.  March 1, 2023 19:09 EST      Transcribed from ambient dictation for ANNE Parry by Cathleen Early.  03/01/23   19:11 EST    Patient or patient representative verbalized consent to the visit recording.  I have personally performed the services described in this document as transcribed by the above individual, and it is both accurate and complete.

## 2023-06-19 ENCOUNTER — OFFICE VISIT (OUTPATIENT)
Dept: CARDIOLOGY | Facility: CLINIC | Age: 63
End: 2023-06-19
Payer: MEDICARE

## 2023-06-19 VITALS
HEIGHT: 62 IN | DIASTOLIC BLOOD PRESSURE: 68 MMHG | HEART RATE: 79 BPM | BODY MASS INDEX: 36.91 KG/M2 | WEIGHT: 200.6 LBS | SYSTOLIC BLOOD PRESSURE: 114 MMHG | OXYGEN SATURATION: 95 %

## 2023-06-19 DIAGNOSIS — I25.10 CORONARY ARTERY DISEASE INVOLVING NATIVE CORONARY ARTERY OF NATIVE HEART WITHOUT ANGINA PECTORIS: ICD-10-CM

## 2023-06-19 DIAGNOSIS — R06.02 SHORTNESS OF BREATH: ICD-10-CM

## 2023-06-19 DIAGNOSIS — R07.2 PRECORDIAL PAIN: ICD-10-CM

## 2023-06-19 DIAGNOSIS — I50.20 HFREF (HEART FAILURE WITH REDUCED EJECTION FRACTION): ICD-10-CM

## 2023-06-19 DIAGNOSIS — N28.9 RENAL INSUFFICIENCY: ICD-10-CM

## 2023-06-19 DIAGNOSIS — R94.39 POSITIVE CARDIAC STRESS TEST: ICD-10-CM

## 2023-06-19 DIAGNOSIS — I50.22 CHRONIC SYSTOLIC CONGESTIVE HEART FAILURE: Primary | ICD-10-CM

## 2023-06-19 PROCEDURE — 1160F RVW MEDS BY RX/DR IN RCRD: CPT | Performed by: NURSE PRACTITIONER

## 2023-06-19 PROCEDURE — 3078F DIAST BP <80 MM HG: CPT | Performed by: NURSE PRACTITIONER

## 2023-06-19 PROCEDURE — 99214 OFFICE O/P EST MOD 30 MIN: CPT | Performed by: NURSE PRACTITIONER

## 2023-06-19 PROCEDURE — 1159F MED LIST DOCD IN RCRD: CPT | Performed by: NURSE PRACTITIONER

## 2023-06-19 PROCEDURE — 3074F SYST BP LT 130 MM HG: CPT | Performed by: NURSE PRACTITIONER

## 2023-06-19 RX ORDER — DULAGLUTIDE 0.75 MG/.5ML
INJECTION, SOLUTION SUBCUTANEOUS
COMMUNITY
Start: 2023-05-01

## 2023-06-19 RX ORDER — ZOLPIDEM TARTRATE 5 MG/1
5 TABLET ORAL NIGHTLY PRN
COMMUNITY

## 2023-06-19 NOTE — LETTER
June 19, 2023       No Recipients    Patient: Maeve Yates   YOB: 1960   Date of Visit: 6/19/2023       Dear Dr. Amos Recipients:    Thank you for referring Mavee Yates to me for evaluation. Below are the relevant portions of my assessment and plan of care.    If you have questions, please do not hesitate to call me. I look forward to following Maeve along with you.         Sincerely,        ANNE Parry        CC:   No Recipients    Marshall Oquendo APRN  06/19/23 [id number]  Signed  Subjective     Maeve Yates is a 63 y.o. female who presents to day for Congestive Heart Failure (3 month f/u ), Fatigue, and Dizziness.    CHIEF COMPLIANT  Chief Complaint   Patient presents with   • Congestive Heart Failure     3 month f/u    • Fatigue   • Dizziness       Active Problems:  Problem List Items Addressed This Visit    None    Problem List:  1. Coronary artery disease. Catheterization in 2009 at [facility] demonstrated 30%  circumflex disease with 50% posterolateral branch.   1.1 follow-up catheterization in September 2016 because of non-ST elevation myocardial infarction demonstrated a total occlusion of the right coronary artery with 90% high-grade LAD disease and 50-70% circumflex disease. Stenting of the LAD was performed. Medical management recommended and percutaneous intervention in the circumflex if patient fails medical therapy.  1.2 heart cath 3/17: stenting of the circumflex. Patent stent to the LAD with chronic total occlusion of the right coronary artery. Medical management recommended  1.3 left heart catheterization 6/20: Left main normal, LAD 30% in-stent stenosis with left-to-right collaterals, circumflex patent stent, left to right collaterals, % occluded, EF 50 to 55%  1.4 Left heart Catheterization 11/24/21: LAD 95% in-stent stenosis with angioplasty that left minimal residual stenosis,  of the RCA that was unable to be crossed, 90% septal , EF less than  30%  1.5 stress test 2/23: Extensive anterior, anterior apical, apical and lateral wall infarct with mild karen-infarct ischemia.  Additionally there is moderately sized mildly dense reversible defect involving the inferior wall and inferior lateral septal wall compatible with ischemia, severely depressed post-rest ejection fraction 31% with anterior hypokinesis septal and inferior wall hypokinesis and distal anterior lateral and apical akinesis to dyskinesis  2. Ischemic cardiomyopathy  1.1 echocardiogram 1/22: EF 25 to 30% dilated left and right atrium, no significant valvular disease  3. Hypertension.   4. Chronic tobacco habituation.   5. Dyslipidemia.   6.  Angioplasty to left lower extremity per Dr. White 8/2/17    HPI  HPI    Maeve Yates is a 63-year-old female patient who is being followed up today for history of heart failure with reduced ejection fraction, coronary artery disease, and chronic arterial hypertension.    Patient does have an extensive history of coronary artery disease in which she has previously been referred to bypass.  The [facility] denied her for bypass surgery because her lungs were to bad.  They did do a angioplasty of the LAD leaving minimal residual stenosis.  An attempt was made to revascularize the  of the RCA but was unsuccessful.  Antianginal therapy has been difficult due to frequent episodes of hypotension.     Patient does have a history of heart failure with reduced ejection fraction in which she is on metoprolol. In addition to Farxiga 10 mg daily. Due to hypotension, ACE/ARB/ARN have not been able to be maintained. Most recent ejection fraction was 01/2022 in which her EF was 25 to 30 percent. She did go under a stress test in 02/2023 that showed compatible with extensive anterior apical and lateral wall infarct with mild karen-infarct ischemia. Additionally, there is a moderate size, mildly dense reversible defect involving the inferior wall and inferior  lateral septum was compatible with ischemia with a severely depressed post stress ejection fraction 31 percent with anterior hypokinesis, septal and inferior wall hypokinesis, distal anterolateral and apical akinesis, the dyskinesis. In addition to that, she did have an echocardiogram done at that same time that showed EF was between 35 and 40 percent, grade 1A diastolic dysfunction, trivial AI and MR. She does have an ICD in place as well. The patient's blood pressure today at 114/68 mmHg. She had a positive stress test. Her RCA has decreased. She had a stent placed approximately 2 years ago. The patient is accompanied by an adult female.    The patient states she has been experiencing chest pain. She has been taking 1 to 2 nitroglycerin tablets daily, but lately she has not been trying to take any. She notes that she will wait for the pain to resolve, and if it resolves then she does not take a nitroglycerin. She states the pain will last a few seconds or several minutes. The pain is located in the left upper chest to the side. She states the chest pain does not worsen with activity. She describes the pain as a variety of sharp and dull. The adult female states they pack nitroglycerin everywhere they go, because her chest pain is getting worse.    She maintains she will experience short of breath at rest, lying flat, or while active.  She does have 3 L of O2 per nasal cannula at night.    She has been experiencing increased fatigue. She states it has worsened. She has been using her oxygen more often.    The patient states she has a knot at the top of her defibrillator.    The adult female states Dr. Boo prescribed Trulicity. He notes the Trulicity has helped the patient's A1c level. She inquires if her blood sugar is associated with her blood pressure. She adds that her A1c was higher before her last lab work in 05/30/2023. She told Dr. Boo the Arronulicity has made her gain weight. She has been trying to eat more  carla.    She reports she had an angioplasty of left lower extremity in 08/2017 by Dr. White. The patient states she will experience pain in her legs when she walks. She will also experience pain in her back and groin.     The patient notes she will see Dr. Bayron Cornell on 06/20/2023.    PRIOR MEDS  Current Outpatient Medications on File Prior to Visit   Medication Sig Dispense Refill   • aspirin 81 MG EC tablet TAKE ONE TABLET BY MOUTH DAILY 30 tablet 11   • atorvastatin (LIPITOR) 40 MG tablet Take 1 tablet by mouth every night at bedtime.     • bumetanide (BUMEX) 2 MG tablet Take 1 tablet by mouth Daily. (Patient taking differently: Take 1 tablet by mouth 2 (Two) Times a Day. 1 tablet bid) 90 tablet 3   • citalopram (CeleXA) 40 MG tablet Take 1 tablet by mouth Daily.  1   • clopidogrel (PLAVIX) 75 MG tablet TAKE 1 TABLET BY MOUTH DAILY. 90 tablet 3   • dapagliflozin Propanediol (Farxiga) 10 MG tablet Take 10 mg by mouth Daily. 30 tablet 11   • docusate sodium (COLACE) 250 MG capsule Take 1 capsule by mouth Daily.     • fenofibrate (TRICOR) 145 MG tablet Take 1 tablet by mouth Daily.  5   • ferrous sulfate 325 (65 FE) MG tablet Take 1 tablet by mouth Daily With Breakfast.     • Fluticasone Furoate-Vilanterol (Breo Ellipta) 100-25 MCG/INH inhaler Inhale 1 puff Daily.     • gabapentin (NEURONTIN) 800 MG tablet Take 1 tablet by mouth 4 (Four) Times a Day.     • Insulin Glargine (BASAGLAR KWIKPEN SC) Inject 60 Units under the skin into the appropriate area as directed Every Night.     • metoprolol succinate XL (TOPROL-XL) 25 MG 24 hr tablet Take 1 tablet by mouth Daily.     • nitroglycerin (Nitrostat) 0.4 MG SL tablet Place 1 tablet under the tongue Every 5 (Five) Minutes As Needed for Chest Pain. 25 tablet 3   • NovoLOG FlexPen 100 UNIT/ML solution pen-injector sc pen 10 Units 3 (Three) Times a Day With Meals.     • pantoprazole (PROTONIX) 40 MG EC tablet Take 1 tablet by mouth Every Morning Before Breakfast.      • potassium chloride 10 MEQ CR tablet Take 1 tablet by mouth Daily. (Patient taking differently: Take 2 tablets by mouth Daily.) 30 tablet 11   • simethicone (MYLICON) 80 MG chewable tablet Chew 1 tablet Every 6 (Six) Hours As Needed for Flatulence.     • Trulicity 0.75 MG/0.5ML solution pen-injector      • Umeclidinium Bromide (Incruse Ellipta) 62.5 MCG/ACT aerosol powder  Inhale.     • zolpidem (Ambien) 5 MG tablet Take 1 tablet by mouth At Night As Needed for Sleep.     • [DISCONTINUED] Fluticasone Furoate-Vilanterol 100-25 MCG/ACT aerosol powder  Inhale 1 puff Daily.       No current facility-administered medications on file prior to visit.       ALLERGIES  Drug [acetaminophen-codeine], Hydrocodone, and Midodrine    HISTORY  Past Medical History:   Diagnosis Date   • Asthma    • CAD (coronary artery disease)     Catheterization in  at [facility] demonstrated 30% circumflex disease with 50% posterolateral branch.    • Chest pain    • CHF (congestive heart failure)    • Clotting disorder     Takes blood thinners   • Congenital heart disease     Hearts at 30%   • COPD (chronic obstructive pulmonary disease)    • Depression    • Diabetes mellitus     checks bs at home daily usually; cannot always follow a diabetic diet she states due to cost of food   • Dyslipidemia    • Fatigue    • Hiatal hernia    • Hyperlipidemia    • Hypertension    • Myocardial infarction    • No natural teeth    • Peripheral vascular disease    • Shortness of breath    • Tobacco use     smokes 2 packs a week   • Wears glasses        Social History     Socioeconomic History   • Marital status:    Tobacco Use   • Smoking status: Former     Packs/day: 0.50     Years: 40.00     Pack years: 20.00     Types: Cigarettes     Quit date: 2021     Years since quittin.8   • Smokeless tobacco: Never   • Tobacco comments:         Substance and Sexual Activity   • Alcohol use: No   • Drug use: No   • Sexual activity: Not Currently  "    Partners: Male     Birth control/protection: Tubal ligation       Family History   Problem Relation Age of Onset   • Hypertension Mother    • Hyperlipidemia Mother    • Skin cancer Mother    • No Known Problems Father        Review of Systems   Constitutional:  Positive for fatigue. Negative for chills, diaphoresis and fever.   Eyes:  Negative for visual disturbance.   Respiratory:  Positive for cough, chest tightness (on and off) and shortness of breath (sitting, activity and movement). Negative for apnea.         3 L as needed   Cardiovascular:  Positive for chest pain (on and off), palpitations (occas.) and leg swelling (L leg occas.).   Gastrointestinal:  Positive for constipation. Negative for blood in stool, diarrhea, nausea and vomiting.   Endocrine: Negative.    Genitourinary: Negative.  Negative for hematuria.   Musculoskeletal:  Positive for arthralgias (liliane. hands, hip pain) and myalgias (left foot). Negative for back pain, neck pain and neck stiffness.   Skin: Negative.    Allergic/Immunologic: Negative.  Negative for environmental allergies and food allergies.   Neurological:  Positive for dizziness (standing up from a seated position, off balance), light-headedness, numbness (liliane. hands and feet) and headaches (occas.). Negative for syncope and weakness.   Hematological:  Bruises/bleeds easily.   Psychiatric/Behavioral:  Positive for sleep disturbance (smothering when lying down, use 3L O2 at night, to bathroom frequently due to taking 2 water pills).      Objective     VITALS: /68 (BP Location: Right arm, Patient Position: Sitting)   Pulse 79   Ht 157.5 cm (62.01\")   Wt 91 kg (200 lb 9.6 oz)   SpO2 95% Comment: room air  BMI 36.68 kg/m²     LABS:   Lab Results (most recent)    Lab work from 05/30/2023 showed hemoglobin A1c was 10.9 percent. GFR is below 30.     None          Stress test from 02/2023 showed compatible with extensive anterior, anterior apical, and lateral wall infarct " with mild karen-infarct ischemia. Additionally, there is a moderate size, mildly dense reversible defect involving the inferior wall and inferolateral septum was compatible with ischemia with a severely depressed post stress ejection fraction of 31 percent with anterior hypokinesis, septal and inferior wall hypokinesis and distal anterolateral and apical akinesis.    Heart Catheterization in 2021 showed she had  of the RCA 90 percent septal , left to right collaterals for a total RCA, and 95 percent in stenosis.    IMAGING:   No Images in the past 120 days found..    Echocardiogram from 02/2023 showed EF was between 35 and 40 percent, grade 1 diastolic dysfunction, trivial AI and MR.    EXAM:  Physical Exam  Vitals and nursing note reviewed.   Constitutional:       Appearance: She is well-developed.   HENT:      Head: Normocephalic.   Neck:      Thyroid: No thyroid mass.      Vascular: No carotid bruit or JVD.      Trachea: Trachea and phonation normal.   Cardiovascular:      Rate and Rhythm: Normal rate and regular rhythm.      Pulses:           Radial pulses are 1+ on the right side and 1+ on the left side.        Dorsalis pedis pulses are 1+ on the right side and 2+ on the left side.        Posterior tibial pulses are 2+ on the right side and 2+ on the left side.      Heart sounds: Normal heart sounds. No murmur heard.    No friction rub. No gallop.   Pulmonary:      Effort: Pulmonary effort is normal. No respiratory distress.      Breath sounds: Decreased air movement present. Decreased breath sounds present. No wheezing or rales.   Musculoskeletal:         General: Swelling ( Trace) present. Normal range of motion.      Cervical back: Neck supple.   Skin:     General: Skin is warm and dry.      Capillary Refill: Capillary refill takes less than 2 seconds.      Findings: No rash.   Neurological:      Mental Status: She is alert and oriented to person, place, and time.   Psychiatric:         Speech:  Speech normal.         Behavior: Behavior normal.         Thought Content: Thought content normal.         Judgment: Judgment normal.       Procedure    Procedures      Assessment & Plan    Diagnosis Plan   1. Chronic systolic congestive heart failure  [facility]    Comprehensive Metabolic Panel    CBC & Differential      2. HFrEF (heart failure with reduced ejection fraction)  [facility]    Comprehensive Metabolic Panel    CBC & Differential      3. Coronary artery disease involving native coronary artery of native heart without angina pectoris  [facility]    Comprehensive Metabolic Panel    CBC & Differential      4. Positive cardiac stress test  [facility]    Comprehensive Metabolic Panel    CBC & Differential      5. Renal insufficiency  [facility]    Comprehensive Metabolic Panel    CBC & Differential      6. Shortness of breath  [facility]    Comprehensive Metabolic Panel    CBC & Differential      7. Precordial pain  [facility]    Comprehensive Metabolic Panel    CBC & Differential        PLAN  Cardiac health maintenance  1. The patient's recent cardiac testing results were discussed in full detail during this visit.  Due to her continued symptoms a joint decision was made to move forth with left heart catheterization for further evaluation of ischemia.  She does have an extensive history of coronary artery disease in which she has previously been denied for coronary artery bypass grafting due to her lung status.  2. The patient's medication regimen was reviewed and discussed with her in full detail.  Unable to further titrate heart failure or antianginal medications due to persistent baseline hypotension.  3. The patient will be scheduled to see Dr. Lloyd for a heart catheterization.  Patient has opted to move forth with a left heart catheterization.  Benefits and risk of the heart catheterization was explained and patient wishes to  continue.    We will also have patient have labs drawn proximately 1 week prior to left heart catheterization for further risk stratification  4.  She is going to be followed up with Dr. Castillo for evaluation of her renal status.  Her most recent creatinine is 1.62 with a eGFR of 35.8 indicating stage III chronic kidney disease.  Would like recommendations on precautions to help avoid renal injury has moving forth with a left heart catheterization.  5.  Informed of signs and symptoms of ACS and advised to seek emergent treatment for any new worsening symptoms.  Patient also advised sooner follow-up as needed.  Also advised to follow-up with family doctor as needed  This note is dictated utilizing voice recognition software.  Although this record has been proof read, transcriptional errors may still be present. If questions occur regarding the content of this record please do not hesitate to call our office.  I have reviewed and confirmed the accuracy of the ROS as documented by the MA/LPN/ANNE Garcia    Assessment  1. Coronary artery disease  2. Chronic arterial hypertension  3. Positive stress test    Return if symptoms worsen or fail to improve, for cath follow up 1-2 weeks.    Diagnoses and all orders for this visit:    1. Chronic systolic congestive heart failure (Primary)  -     [facility]; Future  -     Comprehensive Metabolic Panel; Future  -     CBC & Differential; Future    2. HFrEF (heart failure with reduced ejection fraction)  -     [facility]; Future  -     Comprehensive Metabolic Panel; Future  -     CBC & Differential; Future    3. Coronary artery disease involving native coronary artery of native heart without angina pectoris  -     [facility]; Future  -     Comprehensive Metabolic Panel; Future  -     CBC & Differential; Future    4. Positive cardiac stress test  -     [facility]; Future  -     Comprehensive Metabolic Panel; Future  -     CBC &  Differential; Future    5. Renal insufficiency  -     [facility]; Future  -     Comprehensive Metabolic Panel; Future  -     CBC & Differential; Future    6. Shortness of breath  -     [facility]; Future  -     Comprehensive Metabolic Panel; Future  -     CBC & Differential; Future    7. Precordial pain  -     [facility] Cath; Future  -     Comprehensive Metabolic Panel; Future  -     CBC & Differential; Future        Maeve A Page  reports that she quit smoking about 22 months ago. Her smoking use included cigarettes. She has a 20.00 pack-year smoking history. She has never used smokeless tobacco.        MEDS ORDERED DURING VISIT:  No orders of the defined types were placed in this encounter.          This document has been electronically signed by ANNE Parry Jr.  June 19, 2023 15:38 EDT      Transcribed from ambient dictation for ANNE Parry by Cathleen Early.  06/19/23   17:46 EDT    Patient or patient representative verbalized consent to the visit recording.  I have personally performed the services described in this document as transcribed by the above individual, and it is both accurate and complete.

## 2023-06-19 NOTE — PROGRESS NOTES
Subjective     Maeve Yates is a 63 y.o. female who presents to day for Congestive Heart Failure (3 month f/u ), Fatigue, and Dizziness.    CHIEF COMPLIANT  Chief Complaint   Patient presents with   • Congestive Heart Failure     3 month f/u    • Fatigue   • Dizziness       Active Problems:  Problem List Items Addressed This Visit    None    Problem List:  1. Coronary artery disease. Catheterization in 2009 at [facility] demonstrated 30%  circumflex disease with 50% posterolateral branch.   1.1 follow-up catheterization in September 2016 because of non-ST elevation myocardial infarction demonstrated a total occlusion of the right coronary artery with 90% high-grade LAD disease and 50-70% circumflex disease. Stenting of the LAD was performed. Medical management recommended and percutaneous intervention in the circumflex if patient fails medical therapy.  1.2 heart cath 3/17: stenting of the circumflex. Patent stent to the LAD with chronic total occlusion of the right coronary artery. Medical management recommended  1.3 left heart catheterization 6/20: Left main normal, LAD 30% in-stent stenosis with left-to-right collaterals, circumflex patent stent, left to right collaterals, % occluded, EF 50 to 55%  1.4 Left heart Catheterization 11/24/21: LAD 95% in-stent stenosis with angioplasty that left minimal residual stenosis,  of the RCA that was unable to be crossed, 90% septal , EF less than 30%  1.5 stress test 2/23: Extensive anterior, anterior apical, apical and lateral wall infarct with mild karen-infarct ischemia.  Additionally there is moderately sized mildly dense reversible defect involving the inferior wall and inferior lateral septal wall compatible with ischemia, severely depressed post-rest ejection fraction 31% with anterior hypokinesis septal and inferior wall hypokinesis and distal anterior lateral and apical akinesis to dyskinesis  2. Ischemic cardiomyopathy  1.1 echocardiogram  1/22: EF 25 to 30% dilated left and right atrium, no significant valvular disease  3. Hypertension.   4. Chronic tobacco habituation.   5. Dyslipidemia.   6.  Angioplasty to left lower extremity per Dr. White 8/2/17    HPI  HPI    Maeve Yates is a 63-year-old female patient who is being followed up today for history of heart failure with reduced ejection fraction, coronary artery disease, and chronic arterial hypertension.    Patient does have an extensive history of coronary artery disease in which she has previously been referred to bypass.  The [facility] denied her for bypass surgery because her lungs were to bad.  They did do a angioplasty of the LAD leaving minimal residual stenosis.  An attempt was made to revascularize the  of the RCA but was unsuccessful.  Antianginal therapy has been difficult due to frequent episodes of hypotension.     Patient does have a history of heart failure with reduced ejection fraction in which she is on metoprolol. In addition to Farxiga 10 mg daily. Due to hypotension, ACE/ARB/ARN have not been able to be maintained. Most recent ejection fraction was 01/2022 in which her EF was 25 to 30 percent. She did go under a stress test in 02/2023 that showed compatible with extensive anterior apical and lateral wall infarct with mild karen-infarct ischemia. Additionally, there is a moderate size, mildly dense reversible defect involving the inferior wall and inferior lateral septum was compatible with ischemia with a severely depressed post stress ejection fraction 31 percent with anterior hypokinesis, septal and inferior wall hypokinesis, distal anterolateral and apical akinesis, the dyskinesis. In addition to that, she did have an echocardiogram done at that same time that showed EF was between 35 and 40 percent, grade 1A diastolic dysfunction, trivial AI and MR. She does have an ICD in place as well. The patient's blood pressure today at 114/68 mmHg. She had a  positive stress test. Her RCA has decreased. She had a stent placed approximately 2 years ago. The patient is accompanied by an adult female.    The patient states she has been experiencing chest pain. She has been taking 1 to 2 nitroglycerin tablets daily, but lately she has not been trying to take any. She notes that she will wait for the pain to resolve, and if it resolves then she does not take a nitroglycerin. She states the pain will last a few seconds or several minutes. The pain is located in the left upper chest to the side. She states the chest pain does not worsen with activity. She describes the pain as a variety of sharp and dull. The adult female states they pack nitroglycerin everywhere they go, because her chest pain is getting worse.    She maintains she will experience short of breath at rest, lying flat, or while active.  She does have 3 L of O2 per nasal cannula at night.    She has been experiencing increased fatigue. She states it has worsened. She has been using her oxygen more often.    The patient states she has a knot at the top of her defibrillator.    The adult female states Dr. Boo prescribed Trulicity. He notes the Trulicity has helped the patient's A1c level. She inquires if her blood sugar is associated with her blood pressure. She adds that her A1c was higher before her last lab work in 05/30/2023. She told Dr. Boo the Aislinnity has made her gain weight. She has been trying to eat more salads.    She reports she had an angioplasty of left lower extremity in 08/2017 by Dr. White. The patient states she will experience pain in her legs when she walks. She will also experience pain in her back and groin.     The patient notes she will see Dr. Bayron Cornell on 06/20/2023.    PRIOR MEDS  Current Outpatient Medications on File Prior to Visit   Medication Sig Dispense Refill   • aspirin 81 MG EC tablet TAKE ONE TABLET BY MOUTH DAILY 30 tablet 11   • atorvastatin (LIPITOR) 40 MG tablet  Take 1 tablet by mouth every night at bedtime.     • bumetanide (BUMEX) 2 MG tablet Take 1 tablet by mouth Daily. (Patient taking differently: Take 1 tablet by mouth 2 (Two) Times a Day. 1 tablet bid) 90 tablet 3   • citalopram (CeleXA) 40 MG tablet Take 1 tablet by mouth Daily.  1   • clopidogrel (PLAVIX) 75 MG tablet TAKE 1 TABLET BY MOUTH DAILY. 90 tablet 3   • dapagliflozin Propanediol (Farxiga) 10 MG tablet Take 10 mg by mouth Daily. 30 tablet 11   • docusate sodium (COLACE) 250 MG capsule Take 1 capsule by mouth Daily.     • fenofibrate (TRICOR) 145 MG tablet Take 1 tablet by mouth Daily.  5   • ferrous sulfate 325 (65 FE) MG tablet Take 1 tablet by mouth Daily With Breakfast.     • Fluticasone Furoate-Vilanterol (Breo Ellipta) 100-25 MCG/INH inhaler Inhale 1 puff Daily.     • gabapentin (NEURONTIN) 800 MG tablet Take 1 tablet by mouth 4 (Four) Times a Day.     • Insulin Glargine (BASAGLAR KWIKPEN SC) Inject 60 Units under the skin into the appropriate area as directed Every Night.     • metoprolol succinate XL (TOPROL-XL) 25 MG 24 hr tablet Take 1 tablet by mouth Daily.     • nitroglycerin (Nitrostat) 0.4 MG SL tablet Place 1 tablet under the tongue Every 5 (Five) Minutes As Needed for Chest Pain. 25 tablet 3   • NovoLOG FlexPen 100 UNIT/ML solution pen-injector sc pen 10 Units 3 (Three) Times a Day With Meals.     • pantoprazole (PROTONIX) 40 MG EC tablet Take 1 tablet by mouth Every Morning Before Breakfast.     • potassium chloride 10 MEQ CR tablet Take 1 tablet by mouth Daily. (Patient taking differently: Take 2 tablets by mouth Daily.) 30 tablet 11   • simethicone (MYLICON) 80 MG chewable tablet Chew 1 tablet Every 6 (Six) Hours As Needed for Flatulence.     • Trulicity 0.75 MG/0.5ML solution pen-injector      • Umeclidinium Bromide (Incruse Ellipta) 62.5 MCG/ACT aerosol powder  Inhale.     • zolpidem (Ambien) 5 MG tablet Take 1 tablet by mouth At Night As Needed for Sleep.     • [DISCONTINUED]  Fluticasone Furoate-Vilanterol 100-25 MCG/ACT aerosol powder  Inhale 1 puff Daily.       No current facility-administered medications on file prior to visit.       ALLERGIES  Drug [acetaminophen-codeine], Hydrocodone, and Midodrine    HISTORY  Past Medical History:   Diagnosis Date   • Asthma    • CAD (coronary artery disease)     Catheterization in  at [facility] demonstrated 30% circumflex disease with 50% posterolateral branch.    • Chest pain    • CHF (congestive heart failure)    • Clotting disorder     Takes blood thinners   • Congenital heart disease     Hearts at 30%   • COPD (chronic obstructive pulmonary disease)    • Depression    • Diabetes mellitus     checks bs at home daily usually; cannot always follow a diabetic diet she states due to cost of food   • Dyslipidemia    • Fatigue    • Hiatal hernia    • Hyperlipidemia    • Hypertension    • Myocardial infarction    • No natural teeth    • Peripheral vascular disease    • Shortness of breath    • Tobacco use     smokes 2 packs a week   • Wears glasses        Social History     Socioeconomic History   • Marital status:    Tobacco Use   • Smoking status: Former     Packs/day: 0.50     Years: 40.00     Pack years: 20.00     Types: Cigarettes     Quit date: 2021     Years since quittin.8   • Smokeless tobacco: Never   • Tobacco comments:         Substance and Sexual Activity   • Alcohol use: No   • Drug use: No   • Sexual activity: Not Currently     Partners: Male     Birth control/protection: Tubal ligation       Family History   Problem Relation Age of Onset   • Hypertension Mother    • Hyperlipidemia Mother    • Skin cancer Mother    • No Known Problems Father        Review of Systems   Constitutional:  Positive for fatigue. Negative for chills, diaphoresis and fever.   Eyes:  Negative for visual disturbance.   Respiratory:  Positive for cough, chest tightness (on and off) and shortness of breath (sitting, activity and movement).  "Negative for apnea.         3 L as needed   Cardiovascular:  Positive for chest pain (on and off), palpitations (occas.) and leg swelling (L leg occas.).   Gastrointestinal:  Positive for constipation. Negative for blood in stool, diarrhea, nausea and vomiting.   Endocrine: Negative.    Genitourinary: Negative.  Negative for hematuria.   Musculoskeletal:  Positive for arthralgias (liliane. hands, hip pain) and myalgias (left foot). Negative for back pain, neck pain and neck stiffness.   Skin: Negative.    Allergic/Immunologic: Negative.  Negative for environmental allergies and food allergies.   Neurological:  Positive for dizziness (standing up from a seated position, off balance), light-headedness, numbness (liliane. hands and feet) and headaches (occas.). Negative for syncope and weakness.   Hematological:  Bruises/bleeds easily.   Psychiatric/Behavioral:  Positive for sleep disturbance (smothering when lying down, use 3L O2 at night, to bathroom frequently due to taking 2 water pills).      Objective     VITALS: /68 (BP Location: Right arm, Patient Position: Sitting)   Pulse 79   Ht 157.5 cm (62.01\")   Wt 91 kg (200 lb 9.6 oz)   SpO2 95% Comment: room air  BMI 36.68 kg/m²     LABS:   Lab Results (most recent)    Lab work from 05/30/2023 showed hemoglobin A1c was 10.9 percent. GFR is below 30.     None          Stress test from 02/2023 showed compatible with extensive anterior, anterior apical, and lateral wall infarct with mild karen-infarct ischemia. Additionally, there is a moderate size, mildly dense reversible defect involving the inferior wall and inferolateral septum was compatible with ischemia with a severely depressed post stress ejection fraction of 31 percent with anterior hypokinesis, septal and inferior wall hypokinesis and distal anterolateral and apical akinesis.    Heart Catheterization in 2021 showed she had  of the RCA 90 percent septal , left to right collaterals for a total " RCA, and 95 percent in stenosis.    IMAGING:   No Images in the past 120 days found..    Echocardiogram from 02/2023 showed EF was between 35 and 40 percent, grade 1 diastolic dysfunction, trivial AI and MR.    EXAM:  Physical Exam  Vitals and nursing note reviewed.   Constitutional:       Appearance: She is well-developed.   HENT:      Head: Normocephalic.   Neck:      Thyroid: No thyroid mass.      Vascular: No carotid bruit or JVD.      Trachea: Trachea and phonation normal.   Cardiovascular:      Rate and Rhythm: Normal rate and regular rhythm.      Pulses:           Radial pulses are 1+ on the right side and 1+ on the left side.        Dorsalis pedis pulses are 1+ on the right side and 2+ on the left side.        Posterior tibial pulses are 2+ on the right side and 2+ on the left side.      Heart sounds: Normal heart sounds. No murmur heard.    No friction rub. No gallop.   Pulmonary:      Effort: Pulmonary effort is normal. No respiratory distress.      Breath sounds: Decreased air movement present. Decreased breath sounds present. No wheezing or rales.   Musculoskeletal:         General: Swelling ( Trace) present. Normal range of motion.      Cervical back: Neck supple.   Skin:     General: Skin is warm and dry.      Capillary Refill: Capillary refill takes less than 2 seconds.      Findings: No rash.   Neurological:      Mental Status: She is alert and oriented to person, place, and time.   Psychiatric:         Speech: Speech normal.         Behavior: Behavior normal.         Thought Content: Thought content normal.         Judgment: Judgment normal.       Procedure   Procedures       Assessment & Plan    Diagnosis Plan   1. Chronic systolic congestive heart failure  [facility]    Comprehensive Metabolic Panel    CBC & Differential      2. HFrEF (heart failure with reduced ejection fraction)  [facility]    Comprehensive Metabolic Panel    CBC & Differential      3. Coronary artery disease  involving native coronary artery of native heart without angina pectoris  [facility]    Comprehensive Metabolic Panel    CBC & Differential      4. Positive cardiac stress test  [facility]    Comprehensive Metabolic Panel    CBC & Differential      5. Renal insufficiency  [facility]    Comprehensive Metabolic Panel    CBC & Differential      6. Shortness of breath  [facility]    Comprehensive Metabolic Panel    CBC & Differential      7. Precordial pain  [facility]    Comprehensive Metabolic Panel    CBC & Differential        PLAN  Cardiac health maintenance  1. The patient's recent cardiac testing results were discussed in full detail during this visit.  Due to her continued symptoms a joint decision was made to move forth with left heart catheterization for further evaluation of ischemia.  She does have an extensive history of coronary artery disease in which she has previously been denied for coronary artery bypass grafting due to her lung status.  2. The patient's medication regimen was reviewed and discussed with her in full detail.  Unable to further titrate heart failure or antianginal medications due to persistent baseline hypotension.  3. The patient will be scheduled to see Dr. Lloyd for a heart catheterization.  Patient has opted to move forth with a left heart catheterization.  Benefits and risk of the heart catheterization was explained and patient wishes to continue.    We will also have patient have labs drawn proximately 1 week prior to left heart catheterization for further risk stratification  4.  She is going to be followed up with Dr. Castillo for evaluation of her renal status.  Her most recent creatinine is 1.62 with a eGFR of 35.8 indicating stage III chronic kidney disease.  Would like recommendations on precautions to help avoid renal injury has moving forth with a left heart catheterization.  5.  Informed of signs and symptoms of ACS and  advised to seek emergent treatment for any new worsening symptoms.  Patient also advised sooner follow-up as needed.  Also advised to follow-up with family doctor as needed  This note is dictated utilizing voice recognition software.  Although this record has been proof read, transcriptional errors may still be present. If questions occur regarding the content of this record please do not hesitate to call our office.  I have reviewed and confirmed the accuracy of the ROS as documented by the MA/LPN/RN ANNE Parry    Assessment  1. Coronary artery disease  2. Chronic arterial hypertension  3. Positive stress test    Return if symptoms worsen or fail to improve, for cath follow up 1-2 weeks.    Diagnoses and all orders for this visit:    1. Chronic systolic congestive heart failure (Primary)  -     [facility]; Future  -     Comprehensive Metabolic Panel; Future  -     CBC & Differential; Future    2. HFrEF (heart failure with reduced ejection fraction)  -     [facility]; Future  -     Comprehensive Metabolic Panel; Future  -     CBC & Differential; Future    3. Coronary artery disease involving native coronary artery of native heart without angina pectoris  -     [facility]; Future  -     Comprehensive Metabolic Panel; Future  -     CBC & Differential; Future    4. Positive cardiac stress test  -     [facility]; Future  -     Comprehensive Metabolic Panel; Future  -     CBC & Differential; Future    5. Renal insufficiency  -     [facility]; Future  -     Comprehensive Metabolic Panel; Future  -     CBC & Differential; Future    6. Shortness of breath  -     [facility]; Future  -     Comprehensive Metabolic Panel; Future  -     CBC & Differential; Future    7. Precordial pain  -     [facility]; Future  -     Comprehensive Metabolic Panel; Future  -     CBC & Differential; Future        Maeve A Page  reports that she quit smoking about 22  months ago. Her smoking use included cigarettes. She has a 20.00 pack-year smoking history. She has never used smokeless tobacco.         MEDS ORDERED DURING VISIT:  No orders of the defined types were placed in this encounter.          This document has been electronically signed by ANNE Parry Jr.  June 19, 2023 15:38 EDT      Transcribed from ambient dictation for ANNE Parry by Cathleen Early.  06/19/23   17:46 EDT    Patient or patient representative verbalized consent to the visit recording.  I have personally performed the services described in this document as transcribed by the above individual, and it is both accurate and complete.

## 2023-07-21 ENCOUNTER — OUTSIDE FACILITY SERVICE (OUTPATIENT)
Dept: CARDIOLOGY | Facility: CLINIC | Age: 63
End: 2023-07-21
Payer: MEDICARE

## 2023-07-24 ENCOUNTER — OFFICE VISIT (OUTPATIENT)
Dept: CARDIOLOGY | Facility: CLINIC | Age: 63
End: 2023-07-24
Payer: MEDICARE

## 2023-07-24 VITALS
BODY MASS INDEX: 35.96 KG/M2 | DIASTOLIC BLOOD PRESSURE: 70 MMHG | HEIGHT: 62 IN | SYSTOLIC BLOOD PRESSURE: 104 MMHG | HEART RATE: 83 BPM | WEIGHT: 195.4 LBS | OXYGEN SATURATION: 91 %

## 2023-07-24 DIAGNOSIS — Z98.890 STATUS POST LEFT HEART CATHETERIZATION: Primary | ICD-10-CM

## 2023-07-24 DIAGNOSIS — R06.02 SHORTNESS OF BREATH: ICD-10-CM

## 2023-07-24 DIAGNOSIS — I50.20 HFREF (HEART FAILURE WITH REDUCED EJECTION FRACTION): ICD-10-CM

## 2023-07-24 DIAGNOSIS — R55 NEAR SYNCOPE: ICD-10-CM

## 2023-07-24 DIAGNOSIS — R07.2 PRECORDIAL PAIN: ICD-10-CM

## 2023-07-24 DIAGNOSIS — I25.10 CORONARY ARTERY DISEASE INVOLVING NATIVE CORONARY ARTERY OF NATIVE HEART WITHOUT ANGINA PECTORIS: ICD-10-CM

## 2023-07-24 NOTE — PROGRESS NOTES
Dexter Yates is a 63 y.o. female who presents to day for heart cath follow up .    CHIEF COMPLIANT  Chief Complaint   Patient presents with    heart cath follow up        Active Problems:  Problem List Items Addressed This Visit          Cardiac and Vasculature    Chest pain    Coronary artery disease involving native coronary artery of native heart without angina pectoris       Pulmonary and Pneumonias    Shortness of breath     Other Visit Diagnoses       Status post left heart catheterization    -  Primary    HFrEF (heart failure with reduced ejection fraction)        Near syncope            Problem List:  1. Coronary artery disease. Catheterization in 2009 at [facility] demonstrated 30%  circumflex disease with 50% posterolateral branch.   1.1 follow-up catheterization in September 2016 because of non-ST elevation myocardial infarction demonstrated a total occlusion of the right coronary artery with 90% high-grade LAD disease and 50-70% circumflex disease. Stenting of the LAD was performed. Medical management recommended and percutaneous intervention in the circumflex if patient fails medical therapy.  1.2 heart cath 3/17: stenting of the circumflex. Patent stent to the LAD with chronic total occlusion of the right coronary artery. Medical management recommended  1.3 left heart catheterization 6/20: Left main normal, LAD 30% in-stent stenosis with left-to-right collaterals, circumflex patent stent, left to right collaterals, % occluded, EF 50 to 55%  1.4 Left heart Catheterization 11/24/21: LAD 95% in-stent stenosis with angioplasty that left minimal residual stenosis,  of the RCA that was unable to be crossed, 90% septal , EF less than 30%  1.5 left heart cath 7/23:    2. Ischemic cardiomyopathy  1.1 echocardiogram 1/22: EF 25 to 30% dilated left and right atrium, no significant valvular disease  3. Hypertension.   4. Chronic tobacco habituation.   5. Dyslipidemia.   6.   Angioplasty to left lower extremity per Dr. White 8/2/17    [facility]  HPI    Maeve Yates is a 63-year-old female patient being followed up today status post left heart catheterization. Patient did go under a left heart catheterization. She is accompanied by a adult female.The patient presents today for a follow-up post left heart catheterization. The patient had a complete occlusion of the LAD. She had a 3 x 12 stent placed to the LAD. She had a previous PCI in 2016.   of the RCA was noted in her left heart catheterization in 2017.. Her LAD was 95 percent in 2022 as well. She had angioplasty at that time.  She denies any complications at her right femoral access site.  Dorsalis pedis on the right was palpable.    She is currently taking dual antiplatelet therapy of aspirin, and Plavix; as well as Lipitor 40 mg for high intensity statin therapy,for her coronary artery disease.  Patient is also on metoprolol succinate, and Farxiga for her ischemic cardiomyopathy.  Due to hypotension we have not been able to initiate ACE/ARB/ARNI.  She is on Bumex 2 mg twice daily for diuretic therapy.  She previously was on isosorbide however she did not see any relief when she took isosorbide.     The patient reports she feels better after the procedure and adds she still has some pressure in her chest. She states when she gets up and does things, she gets short of breath and was advised to exercise and stretch her right coronary artery. She confirms she has been walking, and her chest is not hurting like it was. The patient notes earlier it was happening, but yesterday it resolved itself and did not last as long. She confirms she did not take any nitroglycerin    She maintains most of the time she does not feel like she could pass out and notes, today she felt all right, but yesterday she felt like she was going to pass out. The patient adds she will sit down when she feels like she is going to pass out and will hold onto  something, and she will sit there for a little bit, and then it seems like it passes. She has been having bad headaches lately.      PRIOR MEDS  Current Outpatient Medications on File Prior to Visit   Medication Sig Dispense Refill    aspirin 81 MG EC tablet TAKE ONE TABLET BY MOUTH DAILY 30 tablet 11    atorvastatin (LIPITOR) 40 MG tablet Take 1 tablet by mouth every night at bedtime.      bumetanide (BUMEX) 2 MG tablet Take 1 tablet by mouth Daily. (Patient taking differently: Take 1 tablet by mouth 2 (Two) Times a Day. 1 tablet bid) 90 tablet 3    citalopram (CeleXA) 40 MG tablet Take 1 tablet by mouth Daily.  1    clopidogrel (PLAVIX) 75 MG tablet TAKE 1 TABLET BY MOUTH DAILY. 90 tablet 3    dapagliflozin Propanediol (Farxiga) 10 MG tablet Take 10 mg by mouth Daily. 30 tablet 11    docusate sodium (COLACE) 250 MG capsule Take 1 capsule by mouth Daily.      fenofibrate (TRICOR) 145 MG tablet Take 1 tablet by mouth Daily.  5    ferrous sulfate 325 (65 FE) MG tablet Take 1 tablet by mouth Daily With Breakfast.      Fluticasone Furoate-Vilanterol (Breo Ellipta) 100-25 MCG/INH inhaler Inhale 1 puff Daily.      gabapentin (NEURONTIN) 800 MG tablet Take 1 tablet by mouth 4 (Four) Times a Day.      Insulin Glargine (BASAGLAR KWIKPEN SC) Inject 60 Units under the skin into the appropriate area as directed Every Night.      metoprolol succinate XL (TOPROL-XL) 25 MG 24 hr tablet Take 1 tablet by mouth Daily.      nitroglycerin (Nitrostat) 0.4 MG SL tablet Place 1 tablet under the tongue Every 5 (Five) Minutes As Needed for Chest Pain. 25 tablet 3    NovoLOG FlexPen 100 UNIT/ML solution pen-injector sc pen 10 Units 3 (Three) Times a Day With Meals.      pantoprazole (PROTONIX) 40 MG EC tablet Take 1 tablet by mouth Every Morning Before Breakfast.      potassium chloride 10 MEQ CR tablet Take 1 tablet by mouth Daily. (Patient taking differently: Take 2 tablets by mouth Daily.) 30 tablet 11    simethicone (MYLICON) 80 MG  chewable tablet Chew 1 tablet Every 6 (Six) Hours As Needed for Flatulence.      Trulicity 0.75 MG/0.5ML solution pen-injector       Umeclidinium Bromide (Incruse Ellipta) 62.5 MCG/ACT aerosol powder  Inhale.      zolpidem (AMBIEN) 5 MG tablet Take 1 tablet by mouth At Night As Needed for Sleep.       No current facility-administered medications on file prior to visit.       ALLERGIES  Drug [acetaminophen-codeine], Hydrocodone, and Midodrine    HISTORY  Past Medical History:   Diagnosis Date    Asthma     CAD (coronary artery disease)     Catheterization in  at [facility] demonstrated 30% circumflex disease with 50% posterolateral branch.     Chest pain     CHF (congestive heart failure)     Clotting disorder     Takes blood thinners    Congenital heart disease     Hearts at 30%    COPD (chronic obstructive pulmonary disease)     Depression     Diabetes mellitus     checks bs at home daily usually; cannot always follow a diabetic diet she states due to cost of food    Dyslipidemia     Fatigue     Hiatal hernia     Hyperlipidemia     Hypertension     Myocardial infarction     No natural teeth     Peripheral vascular disease     Shortness of breath     Tobacco use     smokes 2 packs a week    Wears glasses        Social History     Socioeconomic History    Marital status:    Tobacco Use    Smoking status: Former     Packs/day: 0.50     Years: 40.00     Pack years: 20.00     Types: Cigarettes     Quit date: 2021     Years since quittin.9    Smokeless tobacco: Never    Tobacco comments:         Substance and Sexual Activity    Alcohol use: No    Drug use: No    Sexual activity: Not Currently     Partners: Male     Birth control/protection: Tubal ligation       Family History   Problem Relation Age of Onset    Hypertension Mother     Hyperlipidemia Mother     Skin cancer Mother     No Known Problems Father        Review of Systems   Constitutional:  Positive for fatigue. Negative for chills and  "fever.   HENT:  Negative for congestion, rhinorrhea and sore throat.    Eyes:  Negative for visual disturbance.   Respiratory:  Positive for chest tightness (MILD CHEST PRESSURE LASTS A FEW SECONDS IS BETTER THAN BEFORE STENT) and shortness of breath.    Cardiovascular:  Positive for chest pain (mild pain also she is unsure if it pressure or pain) and palpitations (flutters). Negative for leg swelling.   Gastrointestinal:  Positive for constipation. Negative for diarrhea and nausea.   Musculoskeletal:  Positive for arthralgias. Negative for back pain and neck pain.   Allergic/Immunologic: Positive for environmental allergies. Negative for food allergies.   Neurological:  Positive for dizziness (getting up), syncope (feels like she could pass out) and light-headedness. Negative for weakness.   Hematological:  Bruises/bleeds easily.   Psychiatric/Behavioral:  Positive for sleep disturbance (No SOB just does not sleep well).      Objective     VITALS: /70 (BP Location: Right arm, Patient Position: Sitting, Cuff Size: Adult)   Pulse 83   Ht 157.5 cm (62.01\")   Wt 88.6 kg (195 lb 6.4 oz)   SpO2 91%   BMI 35.73 kg/m²     LABS:   Lab Results (most recent)       None            IMAGING:   No Images in the past 120 days found..    EXAM:  Physical Exam  Vitals and nursing note reviewed.   Constitutional:       Appearance: She is well-developed.   HENT:      Head: Normocephalic.   Neck:      Thyroid: No thyroid mass.      Vascular: No carotid bruit or JVD.      Trachea: Trachea and phonation normal.   Cardiovascular:      Rate and Rhythm: Normal rate and regular rhythm.      Pulses:           Radial pulses are 2+ on the right side and 2+ on the left side.        Dorsalis pedis pulses are 2+ on the right side and 2+ on the left side.        Posterior tibial pulses are 2+ on the right side and 2+ on the left side.      Heart sounds: Normal heart sounds. No murmur heard.    No friction rub. No gallop.   Pulmonary:      " Effort: Pulmonary effort is normal. No respiratory distress.      Breath sounds: Wheezing present. No rales.   Musculoskeletal:         General: No swelling. Normal range of motion.      Cervical back: Neck supple.   Skin:     General: Skin is warm and dry.      Capillary Refill: Capillary refill takes less than 2 seconds.      Findings: No rash.   Neurological:      Mental Status: She is alert and oriented to person, place, and time.   Psychiatric:         Speech: Speech normal.         Behavior: Behavior normal.         Thought Content: Thought content normal.         Judgment: Judgment normal.       Procedure   Procedures       Assessment & Plan            Diagnosis Plan   1. Status post left heart catheterization        2. Coronary artery disease involving native coronary artery of native heart without angina pectoris        3. HFrEF (heart failure with reduced ejection fraction)        4. Precordial pain        5. Shortness of breath        6. Near syncope        1. The patient's cardiac test results were discussed in full detail with the patient.  Patient did receive a stent to the LAD which was completely occluded.  Patient tolerated well and denies any complications at the right femoral access site.  Pulses are palpable distal to the insertion site.  Informed of the importance of maintaining dual antiplatelet therapy.  2. The patient's medication regimen was discussed with her in full detail.  No changes are recommended at this time.  3.  Patient continues to have some chest pain and shortness of breath as well as near syncope in which we will try to manage medically.  4.  Patient does have heart failure with reduced ejection fraction which she does have an ICD in place as well as on guideline driven medication therapy.  If we can keep patient's blood pressure above 100 systolic we will try to initiate ACE/ARB/ARNI.  5.  Informed of signs and symptoms of ACS and advised to seek emergent treatment for any new  worsening symptoms.  Patient also advised sooner follow-up as needed.  Also advised to follow-up with family doctor as needed  This note is dictated utilizing voice recognition software.  Although this record has been proof read, transcriptional errors may still be present. If questions occur regarding the content of this record please do not hesitate to call our office.  I have reviewed and confirmed the accuracy of the ROS as documented by the MA/LPN/RN ANNE Parry    Assessment  1. Coronary artery disease  2. Chronic arterial hypertension  3. Hyperlipidemia  4. Dyspnea  5. Chest pain  6. Headaches    Return if symptoms worsen or fail to improve, for Next scheduled follow up.    Diagnoses and all orders for this visit:    1. Status post left heart catheterization (Primary)    2. Coronary artery disease involving native coronary artery of native heart without angina pectoris    3. HFrEF (heart failure with reduced ejection fraction)    4. Precordial pain    5. Shortness of breath    6. Near syncope        Maeve A Page  reports that she quit smoking about 1 years ago. Her smoking use included cigarettes. She has a 20.00 pack-year smoking history. She has never used smokeless tobacco.. I have educated her on the risk of diseases from using tobacco products.            MEDS ORDERED DURING VISIT:  No orders of the defined types were placed in this encounter.          This document has been electronically signed by ANNE Parry Jr.  July 30, 2023 21:29 EDT    Transcribed from ambient dictation for ANNE Parry by Barrera Valdez.  07/24/23   16:52 EDT    Patient or patient representative verbalized consent to the visit recording.  I have personally performed the services described in this document as transcribed by the above individual, and it is both accurate and complete.

## 2023-08-07 DIAGNOSIS — I50.22 CHRONIC SYSTOLIC CONGESTIVE HEART FAILURE: ICD-10-CM

## 2023-08-07 DIAGNOSIS — I50.20 HFREF (HEART FAILURE WITH REDUCED EJECTION FRACTION): ICD-10-CM

## 2023-08-07 DIAGNOSIS — R06.02 SHORTNESS OF BREATH: ICD-10-CM

## 2023-08-07 DIAGNOSIS — I25.10 CORONARY ARTERY DISEASE INVOLVING NATIVE CORONARY ARTERY OF NATIVE HEART WITHOUT ANGINA PECTORIS: ICD-10-CM

## 2023-08-07 DIAGNOSIS — R07.2 PRECORDIAL PAIN: ICD-10-CM

## 2023-08-07 DIAGNOSIS — N28.9 RENAL INSUFFICIENCY: ICD-10-CM

## 2023-08-07 DIAGNOSIS — R94.39 POSITIVE CARDIAC STRESS TEST: ICD-10-CM

## 2023-08-17 ENCOUNTER — TELEPHONE (OUTPATIENT)
Dept: CARDIOLOGY | Facility: CLINIC | Age: 63
End: 2023-08-17

## 2023-10-06 ENCOUNTER — CLINICAL SUPPORT NO REQUIREMENTS (OUTPATIENT)
Dept: CARDIOLOGY | Facility: CLINIC | Age: 63
End: 2023-10-06
Payer: MEDICARE

## 2023-10-06 DIAGNOSIS — I50.22 CHRONIC SYSTOLIC CONGESTIVE HEART FAILURE: ICD-10-CM

## 2023-10-06 DIAGNOSIS — I25.5 ISCHEMIC CARDIOMYOPATHY: ICD-10-CM

## 2023-10-06 DIAGNOSIS — I47.29 NSVT (NONSUSTAINED VENTRICULAR TACHYCARDIA): Primary | ICD-10-CM

## 2023-11-02 ENCOUNTER — OFFICE VISIT (OUTPATIENT)
Dept: CARDIOLOGY | Facility: CLINIC | Age: 63
End: 2023-11-02
Payer: MEDICARE

## 2023-11-02 VITALS
BODY MASS INDEX: 36.84 KG/M2 | OXYGEN SATURATION: 92 % | SYSTOLIC BLOOD PRESSURE: 98 MMHG | HEART RATE: 75 BPM | WEIGHT: 200.2 LBS | DIASTOLIC BLOOD PRESSURE: 70 MMHG | HEIGHT: 62 IN

## 2023-11-02 DIAGNOSIS — R07.2 PRECORDIAL PAIN: ICD-10-CM

## 2023-11-02 DIAGNOSIS — R06.02 SHORTNESS OF BREATH: Primary | ICD-10-CM

## 2023-11-02 DIAGNOSIS — I25.5 ISCHEMIC CARDIOMYOPATHY: ICD-10-CM

## 2023-11-02 DIAGNOSIS — I10 ESSENTIAL (PRIMARY) HYPERTENSION: ICD-10-CM

## 2023-11-02 DIAGNOSIS — I50.20 HFREF (HEART FAILURE WITH REDUCED EJECTION FRACTION): ICD-10-CM

## 2023-11-02 NOTE — PROGRESS NOTES
Subjective     Maeve Yates is a 63 y.o. female who presents to day for Follow-up (3 month ) and Chest Pain (Was seen in [facility] ER 10/28/2023 records being requested ).    CHIEF COMPLIANT  Chief Complaint   Patient presents with    Follow-up     3 month     Chest Pain     Was seen in [facility] ER 10/28/2023 records being requested        Active Problems:  Problem List:  1. Coronary artery disease. Catheterization in 2009 at [facility] demonstrated 30%  circumflex disease with 50% posterolateral branch.   1.1 follow-up catheterization in September 2016 because of non-ST elevation myocardial infarction demonstrated a total occlusion of the right coronary artery with 90% high-grade LAD disease and 50-70% circumflex disease. Stenting of the LAD was performed. Medical management recommended and percutaneous intervention in the circumflex if patient fails medical therapy.  1.2 heart cath 3/17: stenting of the circumflex. Patent stent to the LAD with chronic total occlusion of the right coronary artery. Medical management recommended  1.3 left heart catheterization 6/20: Left main normal, LAD 30% in-stent stenosis with left-to-right collaterals, circumflex patent stent, left to right collaterals, % occluded, EF 50 to 55%  1.4 Left heart Catheterization 11/24/21: LAD 95% in-stent stenosis with angioplasty that left minimal residual stenosis,  of the RCA that was unable to be crossed, 90% septal , EF less than 30%  1.5 left heart cath 7/23:    2. Ischemic cardiomyopathy  1.1 echocardiogram 1/22: EF 25 to 30% dilated left and right atrium, no significant valvular disease  3. Hypertension.   4. Chronic tobacco habituation.   5. Dyslipidemia.   6.  Angioplasty to left lower extremity per Dr. White 8/2/17    HPI  HPI  Maeve Johnson is a 63-year-old female patient who is being followed up today for chest pain in which she went to the [facility] ER on 10/28/2023.  Most recently she had a left heart  catheterization in July 2023.  That left heart catheterization identified 50 to 70% in the obtuse margin, LAD was totally occluded which a stent was placed.  EF 30 to 35%.  She is on high intensity statin therapy of atorvastatin dual antiplatelet therapy of aspirin and Plavix.    Her records from her hospitalization in [facility] is not available to us at this time.  We did discuss it and she reported that she was told everything is relatively within normal limits.  She went to the ER due to the fact that she was having chest pain in the left sternal border that occurred and started while she was at rest.  She described it as a sharp to pressure-like sensation.  She said it lasted pretty much all day.  She ended up taking nitroglycerin on 2 different occasions on that day.  She said it really did not ease off until she got to the emergency department.  She continues to have some pain but not to the extent that sent her to the emergency department is more intermittent.  She says that it happens on a frequency about every other day she even had some chest pain today.    She does report shortness of breath occurs with activity.  She says getting ready to go to bed and when she lays flat she has to take a few deep breaths in order to get her breath.  She does have some level of orthopnea but usually does not experience too much dyspnea at rest.    Patient does have heart failure with reduced ejection fraction in which she is on metoprolol succinate and Farxiga.  She is not been able to tolerate ACE/ARB/ARNI due to periods of hypotension.  Her blood pressure is hypotensive today at 98/70 heart rate is 75.  She is on Bumex for diuretic therapy.    She denies any lower extremity edema, dizziness, lightheadedness, syncope, or strokelike symptoms.  Current Outpatient Medications on File Prior to Visit   Medication Sig Dispense Refill    aspirin 81 MG EC tablet TAKE ONE TABLET BY MOUTH DAILY 30 tablet 11    atorvastatin  (LIPITOR) 40 MG tablet Take 1 tablet by mouth every night at bedtime.      bumetanide (BUMEX) 2 MG tablet Take 1 tablet by mouth Daily. (Patient taking differently: Take 1 tablet by mouth 2 (Two) Times a Day. 1 tablet bid) 90 tablet 3    citalopram (CeleXA) 40 MG tablet Take 1 tablet by mouth Daily.  1    clopidogrel (PLAVIX) 75 MG tablet TAKE 1 TABLET BY MOUTH DAILY. 90 tablet 3    dapagliflozin Propanediol (Farxiga) 10 MG tablet Take 10 mg by mouth Daily. 30 tablet 11    docusate sodium (COLACE) 250 MG capsule Take 1 capsule by mouth Daily.      fenofibrate (TRICOR) 145 MG tablet Take 1 tablet by mouth Daily.  5    ferrous sulfate 325 (65 FE) MG tablet Take 1 tablet by mouth Daily With Breakfast.      Fluticasone Furoate-Vilanterol (Breo Ellipta) 100-25 MCG/INH inhaler Inhale 1 puff Daily.      gabapentin (NEURONTIN) 800 MG tablet Take 1 tablet by mouth 4 (Four) Times a Day.      Insulin Glargine (BASAGLAR KWIKPEN SC) Inject 60 Units under the skin into the appropriate area as directed Every Night.      metoprolol succinate XL (TOPROL-XL) 25 MG 24 hr tablet Take 1 tablet by mouth Daily.      nitroglycerin (Nitrostat) 0.4 MG SL tablet Place 1 tablet under the tongue Every 5 (Five) Minutes As Needed for Chest Pain. 25 tablet 3    NovoLOG FlexPen 100 UNIT/ML solution pen-injector sc pen 10 Units 3 (Three) Times a Day With Meals.      pantoprazole (PROTONIX) 40 MG EC tablet Take 1 tablet by mouth Every Morning Before Breakfast.      potassium chloride 10 MEQ CR tablet Take 1 tablet by mouth Daily. (Patient taking differently: Take 2 tablets by mouth Daily.) 30 tablet 11    simethicone (MYLICON) 80 MG chewable tablet Chew 1 tablet Every 6 (Six) Hours As Needed for Flatulence.      Trulicity 0.75 MG/0.5ML solution pen-injector       Umeclidinium Bromide (Incruse Ellipta) 62.5 MCG/ACT aerosol powder  Inhale.      zolpidem (AMBIEN) 5 MG tablet Take 1 tablet by mouth At Night As Needed for Sleep.       No current  facility-administered medications on file prior to visit.       ALLERGIES  Drug [acetaminophen-codeine], Hydrocodone, and Midodrine    HISTORY  Past Medical History:   Diagnosis Date    Asthma     CAD (coronary artery disease)     Catheterization in  at [facility] demonstrated 30% circumflex disease with 50% posterolateral branch.     Chest pain     CHF (congestive heart failure)     Clotting disorder     Takes blood thinners    Congenital heart disease     Hearts at 30%    COPD (chronic obstructive pulmonary disease)     Depression     Diabetes mellitus     checks bs at home daily usually; cannot always follow a diabetic diet she states due to cost of food    Dyslipidemia     Fatigue     Hiatal hernia     Hyperlipidemia     Hypertension     Myocardial infarction     No natural teeth     Peripheral vascular disease     Shortness of breath     Tobacco use     smokes 2 packs a week    Wears glasses        Social History     Socioeconomic History    Marital status:    Tobacco Use    Smoking status: Former     Packs/day: 0.50     Years: 40.00     Additional pack years: 0.00     Total pack years: 20.00     Types: Cigarettes     Quit date: 2021     Years since quittin.2    Smokeless tobacco: Never    Tobacco comments:         Substance and Sexual Activity    Alcohol use: No    Drug use: No    Sexual activity: Not Currently     Partners: Male     Birth control/protection: Tubal ligation       Family History   Problem Relation Age of Onset    Hypertension Mother     Hyperlipidemia Mother     Skin cancer Mother     No Known Problems Father        Review of Systems   Constitutional:  Positive for fatigue. Negative for chills and fever.   HENT:  Positive for rhinorrhea and sore throat. Negative for congestion.    Eyes:  Negative for visual disturbance.   Respiratory:  Positive for chest tightness. Negative for shortness of breath and wheezing.    Cardiovascular:  Positive for chest pain and palpitations.  "Negative for leg swelling.   Gastrointestinal: Negative.    Endocrine: Negative.    Genitourinary: Negative.    Musculoskeletal: Negative.  Negative for arthralgias, back pain and neck pain.   Skin: Negative.  Negative for rash and wound.   Allergic/Immunologic: Positive for environmental allergies.   Neurological: Negative.  Negative for dizziness, weakness, numbness and headaches.   Hematological:  Bruises/bleeds easily.   Psychiatric/Behavioral:  Positive for sleep disturbance (oxygen ( 2 )).        Objective     VITALS: BP 98/70   Pulse 75   Ht 157.5 cm (62.01\")   Wt 90.8 kg (200 lb 3.2 oz)   SpO2 92%   BMI 36.61 kg/m²     LABS:   Lab Results (most recent)       None            IMAGING:   No Images in the past 120 days found..    EXAM:  Physical Exam  Vitals and nursing note reviewed.   Constitutional:       Appearance: She is well-developed.   HENT:      Head: Normocephalic.   Neck:      Thyroid: No thyroid mass.      Vascular: No carotid bruit or JVD.      Trachea: Trachea and phonation normal.   Cardiovascular:      Rate and Rhythm: Normal rate and regular rhythm.      Pulses:           Radial pulses are 2+ on the right side and 2+ on the left side.        Posterior tibial pulses are 2+ on the right side and 2+ on the left side.      Heart sounds: Murmur heard.      No friction rub. No gallop.   Pulmonary:      Effort: Pulmonary effort is normal. No respiratory distress.      Breath sounds: Normal breath sounds. No wheezing or rales.   Musculoskeletal:         General: No swelling. Normal range of motion.      Cervical back: Neck supple.   Skin:     General: Skin is warm and dry.      Capillary Refill: Capillary refill takes less than 2 seconds.      Findings: No rash.   Neurological:      Mental Status: She is alert and oriented to person, place, and time.   Psychiatric:         Speech: Speech normal.         Behavior: Behavior normal.         Thought Content: Thought content normal.         Judgment: " Judgment normal.         Procedure     ECG 12 Lead    Date/Time: 11/2/2023 1:46 PM  Performed by: Marshall Oquendo APRN    Authorized by: Marshall Oquendo APRN  Comparison: compared with previous ECG from 12/14/2022  Similar to previous ECG  Rhythm: sinus rhythm  Rate: normal  BPM: 75  QRS axis: normal  Other findings: non-specific ST-T wave changes  Comments: QTc 418 ms  No acute changes             Assessment & Plan    Diagnosis Plan   1. Shortness of breath  Stress Test With Myocardial Perfusion One Day      2. Precordial pain  Stress Test With Myocardial Perfusion One Day      3. HFrEF (heart failure with reduced ejection fraction)  Stress Test With Myocardial Perfusion One Day      4. Ischemic cardiomyopathy  Stress Test With Myocardial Perfusion One Day      5. Essential (primary) hypertension  Stress Test With Myocardial Perfusion One Day      1.  Patient's blood pressure is controlled on current blood pressure medication regimen.  No medication changes are warranted at this time.  Patient advised to monitor blood pressure on a daily basis and report any persistent highs or lows.  Set goal blood pressure for patient at 130/80 or below.  2.  Due to patient's history of ischemic cardiomyopathy chest pain shortness of breath and worsening symptoms that led her to the ER I do think it is appropriate to have her undergo repeat stress test for evaluation of ischemia as a potential cause of her symptoms  3.  Patient is on guideline driven medication for her heart failure with reduced ejection fraction with metoprolol succinate, Farxiga.  We are not able to start ACE/ARB/ARNI be due to her blood pressure being decreased.  4.  Patient was prescribed sublingual nitroglycerin and advised he can take up to 3 doses 5 minutes apart and if pain is not relieved after the third dose go to the emergency department.  Advised to use extreme caution due to hypotension.  She is to check her blood pressure prior to taking  nitroglycerin.  5.  Informed of signs and symptoms of ACS and advised to seek emergent treatment for any new worsening symptoms.  Patient also advised sooner follow-up as needed.  Also advised to follow-up with family doctor as needed  This note is dictated utilizing voice recognition software.  Although this record has been proof read, transcriptional errors may still be present. If questions occur regarding the content of this record please do not hesitate to call our office.  I have reviewed and confirmed the accuracy of the ROS as documented by the MA/LPN/RN ANNE Parry    No follow-ups on file.    Diagnoses and all orders for this visit:    1. Shortness of breath (Primary)  -     Stress Test With Myocardial Perfusion One Day; Future    2. Precordial pain  -     Stress Test With Myocardial Perfusion One Day; Future    3. HFrEF (heart failure with reduced ejection fraction)  -     Stress Test With Myocardial Perfusion One Day; Future    4. Ischemic cardiomyopathy  -     Stress Test With Myocardial Perfusion One Day; Future    5. Essential (primary) hypertension  -     Stress Test With Myocardial Perfusion One Day; Future    Other orders  -     ECG 12 Lead               MEDS ORDERED DURING VISIT:  No orders of the defined types were placed in this encounter.          This document has been electronically signed by Marshall Oquendo Jr., ANNE  November 2, 2023 14:20 EDT

## 2023-11-29 ENCOUNTER — TELEPHONE (OUTPATIENT)
Dept: CARDIOLOGY | Facility: CLINIC | Age: 63
End: 2023-11-29
Payer: MEDICARE

## 2023-11-29 DIAGNOSIS — I50.20 HFREF (HEART FAILURE WITH REDUCED EJECTION FRACTION): ICD-10-CM

## 2023-11-29 RX ORDER — DAPAGLIFLOZIN 10 MG/1
1 TABLET, FILM COATED ORAL DAILY
Qty: 30 TABLET | Refills: 11 | Status: SHIPPED | OUTPATIENT
Start: 2023-11-29

## 2023-11-29 NOTE — TELEPHONE ENCOUNTER
Relay...  Message from Plan  The patient currently has access to the requested medication and a Prior Authorization is not needed for the patient/medication.    Called [facility], patient recently picked up medication and fill is too soon. Prior authorization not needed. Unable to reach patient.

## 2023-11-29 NOTE — TELEPHONE ENCOUNTER
Caller: Maeve Yates    Relationship: Self    Best call back number: [phone]    What is the best time to reach you: ANYTIME    Who are you requesting to speak with (clinical staff, provider,  specific staff member): CLINICAL    Do you know the name of the person who called: NOT SURE    What was the call regarding: PATIENT IS RETURNING MISSED CALL AND THINKS IT COULD BE ABOUT HER MEDICATION REFILL AUTHORIZATION REQUEST    Is it okay if the provider responds through MyChart: CALL PLEASE

## 2023-11-29 NOTE — TELEPHONE ENCOUNTER
Caller: Maeve Yates    Relationship: Self    Best call back number: [phone]    What is the best time to reach you: ANY    Who are you requesting to speak with (clinical staff, provider,  specific staff member): CLINICAL        What was the call regarding: PATIENT CALLED TO ADVISE THE FARIXGA MEDICATION THAT WAS SENT TO THE PHARMACY NEEDS TO BE PRE-AUTHORIZED, ACCORDING TO THE PHARMACIST. PATIENT USES [facility]. PLEASE REACH OUT TO THE PHARMACY IN REGARDS TO THIS ISSUE.

## 2024-01-02 ENCOUNTER — TELEPHONE (OUTPATIENT)
Dept: CARDIOLOGY | Facility: CLINIC | Age: 64
End: 2024-01-02

## 2024-01-02 NOTE — TELEPHONE ENCOUNTER
Name:     Relationship:     Best Callback Number: [phone]     Incoming call to the Hub, requesting to  Reschedule their Device Check appointment on 1/5/24.     Per Hub workflow, further review is needed before the task can be completed.    Result of Call: Please reach out to the patient to reschedule

## 2024-02-06 ENCOUNTER — TELEPHONE (OUTPATIENT)
Dept: CARDIOLOGY | Facility: CLINIC | Age: 64
End: 2024-02-06
Payer: MEDICARE

## 2024-02-06 NOTE — TELEPHONE ENCOUNTER
Missed transmission, called patient and spoke to daughter, patient is currently at physician office. They will check on the [employer] phone when they get home.

## 2024-02-08 ENCOUNTER — TELEPHONE (OUTPATIENT)
Dept: CARDIOLOGY | Facility: CLINIC | Age: 64
End: 2024-02-08
Payer: MEDICARE

## 2024-02-08 NOTE — TELEPHONE ENCOUNTER
Received cardiac clearance request from Dr. Huang Spears [facility] stating pt has office surgery excision scheduled for 02/27/2024 and is requiring a cardiac clearance. Placed cardiac clearance request in Solange's inbox to review and address with provider.

## 2024-02-14 ENCOUNTER — TELEPHONE (OUTPATIENT)
Dept: CARDIOLOGY | Facility: CLINIC | Age: 64
End: 2024-02-14
Payer: MEDICARE

## 2024-03-07 ENCOUNTER — TELEPHONE (OUTPATIENT)
Dept: CARDIOLOGY | Facility: CLINIC | Age: 64
End: 2024-03-07
Payer: MEDICARE

## 2024-03-07 NOTE — TELEPHONE ENCOUNTER
Called and spoke with the pt's daughter who is on the HIPAA and provided the instruction for holding Plavix that is noted on her clearance letter.  Verified procedure, surgeon, & date of service.

## 2024-03-07 NOTE — TELEPHONE ENCOUNTER
Caller: Amy Johnson    Relationship: Emergency Contact    Best call back number: [phone]    What is the best time to reach you: ANY    What was the call regarding: PATIENT ASKING IF THE CLEARANCE OKAYS STOPPING PLAVIX TOMORROW FOR THE PATIENT'S UPCOMING SURGERY-3.13.2024    Is it okay if the provider responds through MyChart: NO

## 2024-03-28 ENCOUNTER — TELEPHONE (OUTPATIENT)
Dept: CARDIOLOGY | Facility: CLINIC | Age: 64
End: 2024-03-28
Payer: MEDICARE

## 2024-03-28 NOTE — TELEPHONE ENCOUNTER
"Caller: Amy Johnson    Relationship to patient: Emergency Contact    Best call back number: [phone]    Chief complaint:     Type of visit: FOLLOW UP    Requested date: TOMORROW OR 4.4.2024     If rescheduling, when is the original appointment: NONE     Additional notes:HUB HAS NO SCHEDULING TIME FRAME PATIENT WAS LAST SEEN 11.2023. PLEASE CALL THE ABOVE TO SCHEDULE THE PATIENT AND SHE STATES THE PATIENT IS COMPLAINING OF HER HEART \"HURTING\" FOR THE LAST 2 WEEKS.          "

## 2024-04-02 NOTE — TELEPHONE ENCOUNTER
Caller: Amy Johnson    Relationship: Emergency Contact    Best call back number: [phone]    What is the best time to reach you: ANY    What was the call regarding: PER THE ABOVE THE PATIENT DOES NOT WANT AN APPOINTMENT WITH [facility], SHE WANTS TO SEE KIM WALTON. PLEASE CALL THE ABOVE TO DISCUSS. WOULD LIKE AN ONLINE VISIT IF POSSIBLE    Is it okay if the provider responds through MyChart: NO

## 2024-04-02 NOTE — TELEPHONE ENCOUNTER
Called and informed pt's daughter that we have no openings w/ Jr at this time. Stated that if pt is needing an ASAP appt due to issues, we can get her in w/ bhavesh, whom is also a great provider. Stated we can get her in w/ Jr for next appt. Also explained that we have to have in office visits to provide proper cardiac care w/ EKGs, exams, etc. She stated she would discuss it w/ pt and get back w/ us. I stated that would be great, but did make her aware that there is a waitlist for Jr and that I can add pt to it, but it may be a while for appt.

## 2024-04-22 ENCOUNTER — TELEPHONE (OUTPATIENT)
Dept: CARDIOLOGY | Facility: CLINIC | Age: 64
End: 2024-04-22
Payer: MEDICARE

## 2024-04-22 ENCOUNTER — OFFICE VISIT (OUTPATIENT)
Dept: CARDIOLOGY | Facility: CLINIC | Age: 64
End: 2024-04-22
Payer: MEDICARE

## 2024-04-22 VITALS
WEIGHT: 193 LBS | DIASTOLIC BLOOD PRESSURE: 69 MMHG | HEART RATE: 82 BPM | BODY MASS INDEX: 35.51 KG/M2 | HEIGHT: 62 IN | OXYGEN SATURATION: 99 % | SYSTOLIC BLOOD PRESSURE: 109 MMHG

## 2024-04-22 DIAGNOSIS — R06.02 SHORTNESS OF BREATH: ICD-10-CM

## 2024-04-22 DIAGNOSIS — R07.2 PRECORDIAL PAIN: ICD-10-CM

## 2024-04-22 DIAGNOSIS — I10 ESSENTIAL (PRIMARY) HYPERTENSION: ICD-10-CM

## 2024-04-22 DIAGNOSIS — R42 DIZZY: ICD-10-CM

## 2024-04-22 DIAGNOSIS — I25.5 ISCHEMIC CARDIOMYOPATHY: ICD-10-CM

## 2024-04-22 DIAGNOSIS — I50.20 HFREF (HEART FAILURE WITH REDUCED EJECTION FRACTION): Primary | ICD-10-CM

## 2024-04-22 DIAGNOSIS — I25.718 CORONARY ARTERY DISEASE OF AUTOLOGOUS VEIN BYPASS GRAFT WITH STABLE ANGINA PECTORIS: ICD-10-CM

## 2024-04-22 PROCEDURE — 99214 OFFICE O/P EST MOD 30 MIN: CPT | Performed by: NURSE PRACTITIONER

## 2024-04-22 PROCEDURE — 1159F MED LIST DOCD IN RCRD: CPT | Performed by: NURSE PRACTITIONER

## 2024-04-22 PROCEDURE — 1160F RVW MEDS BY RX/DR IN RCRD: CPT | Performed by: NURSE PRACTITIONER

## 2024-04-22 PROCEDURE — 93000 ELECTROCARDIOGRAM COMPLETE: CPT | Performed by: CLINICAL NURSE SPECIALIST

## 2024-04-22 PROCEDURE — 3074F SYST BP LT 130 MM HG: CPT | Performed by: NURSE PRACTITIONER

## 2024-04-22 PROCEDURE — 3078F DIAST BP <80 MM HG: CPT | Performed by: NURSE PRACTITIONER

## 2024-04-22 RX ORDER — CETIRIZINE HYDROCHLORIDE 10 MG/1
10 TABLET ORAL DAILY
COMMUNITY

## 2024-04-22 RX ORDER — METOLAZONE 5 MG/1
5 TABLET ORAL DAILY
Qty: 5 TABLET | Refills: 0 | Status: SHIPPED | OUTPATIENT
Start: 2024-04-22

## 2024-04-22 NOTE — TELEPHONE ENCOUNTER
Caller: Amy Johnson    Relationship to patient: Emergency Contact    Best call back number: [phone]    Chief complaint:     Type of visit: FOLLOW UP    Requested date: ASAP     If rescheduling, when is the original appointment: 7.16.2024     Additional notes:THE ABOVE ADVISING PATIENT'S CHEST HAS BEEN HURTING FOR THE LAST MONTH. SHE WILL SEE DR. MARTE OR KIM WALTON. PLEASE CALL THE ABOVE IF THERE IS AN AVAILABILITY.    TAKING MORE NITRO THAN USUAL

## 2024-04-22 NOTE — PROGRESS NOTES
Subjective     Maeve Yates is a 63 y.o. female who presents today for Chest Pain (Sometimes sharp sometimes dull  pain in neck on the left side. She also has also been having pain in both arms. She has been having to take Nitro more often . ) and Shortness of Breath.    CHIEF COMPLIANT  Chief Complaint   Patient presents with    Chest Pain     Sometimes sharp sometimes dull  pain in neck on the left side. She also has also been having pain in both arms. She has been having to take Nitro more often .     Shortness of Breath       Active Problems:  1. Coronary artery disease. Catheterization in 2009 at [facility] demonstrated 30%  circumflex disease with 50% posterolateral branch.   1.1 follow-up catheterization in September 2016 because of non-ST elevation myocardial infarction demonstrated a total occlusion of the right coronary artery with 90% high-grade LAD disease and 50-70% circumflex disease. Stenting of the LAD was performed. Medical management recommended and percutaneous intervention in the circumflex if patient fails medical therapy.  1.2 heart cath 3/17: stenting of the circumflex. Patent stent to the LAD with chronic total occlusion of the right coronary artery. Medical management recommended  1.3 left heart catheterization 6/20: Left main normal, LAD 30% in-stent stenosis with left-to-right collaterals, circumflex patent stent, left to right collaterals, % occluded, EF 50 to 55%  1.4 Left heart Catheterization 11/24/21: LAD 95% in-stent stenosis with angioplasty that left minimal residual stenosis,  of the RCA that was unable to be crossed, 90% septal , EF less than 30%  1.5 left heart cath 7/23:  successful stenting of  in the proximal third of the LAD related to intra-stent restenosis.  EF 30-35% with global hypokinesis.     2. Ischemic cardiomyopathy  1.1 echocardiogram 1/22: EF 25 to 30% dilated left and right atrium, no significant valvular disease  3. Hypertension.    4. Chronic tobacco habituation.   5. Dyslipidemia.   6.  Angioplasty to left lower extremity per Dr. White 8/2/17    HPI  Ms. Maeve Johnson is a 63-year-old female patient who is being seen today for history of coronary artery disease, chronic arterial hypertension, and chest pain.    Patient does have a history of coronary artery disease and which most recently her left heart catheterization included successful stenting of his  in the proximal third of the LAD related to IntraStent restenosis.  She also has a residual  of the RCA which previously was unable to be stented.  She is on dual antiplatelet therapy of aspirin and Plavix.  High intensity statin therapy of atorvastatin.  She has previously been on Ranexa and isosorbide for antianginal therapy however this was discontinued due to patient request due to the quality of medications she is taken.  They did not render her any significant relief of the chest pain.    Patient does have chronic arterial hypertension which she is on metoprolol.  Today her blood pressure is well-controlled at 109/69 heart rate of 82.  She does have some associated dizziness she changes positions too quickly.  However she does experience some dizziness randomly while walking as well.    Patient does have heart failure with reduced ejection fraction most recently 30 to 35% and July 2023.  She is on metoprolol succinate and Farxiga.  Due to hypotension Entresto was held.    Patient reports worsening chest pain that occurs in her mid sternum.  She says its sharp to a dull like sensation that varies.  She says that it is worse with activity and usually occurs when she is active.  She does have associated shortness of air.  She says that she is having to take nitroglycerin more often at a frequency of 1-2 times a week.  She says on some occasions she is even had to take 2 to get the chest pains are relieved.  She also says when she does develop chest pain that she will lay down  and rest and it also helps relieve the pain.  This is typical of angina.    Patient does report chronic fatigue that is also gotten worse.  She says she just absolutely stays tired all the time.    Patient does report shortness of breath is worse with exertion.  She says walking even a short distance causes her to become short of breath.  This 2 feels as if its gotten worse.  She has dyspnea at rest as well.  She does report some levels of orthopnea and PND.          PRIOR MEDS  Current Outpatient Medications on File Prior to Visit   Medication Sig Dispense Refill    aspirin 81 MG EC tablet TAKE ONE TABLET BY MOUTH DAILY 30 tablet 11    atorvastatin (LIPITOR) 40 MG tablet Take 1 tablet by mouth every night at bedtime.      bumetanide (BUMEX) 2 MG tablet Take 1 tablet by mouth Daily. (Patient taking differently: Take 1 tablet by mouth 2 (Two) Times a Day. 1 tablet bid) 90 tablet 3    cetirizine (zyrTEC) 10 MG tablet Take 1 tablet by mouth Daily.      citalopram (CeleXA) 40 MG tablet Take 1 tablet by mouth Daily.  1    clopidogrel (PLAVIX) 75 MG tablet TAKE 1 TABLET BY MOUTH DAILY. 90 tablet 3    docusate sodium (COLACE) 250 MG capsule Take 1 capsule by mouth Daily.      Farxiga 10 MG tablet TAKE 1 TABLET BY MOUTH DAILY 30 tablet 11    fenofibrate (TRICOR) 145 MG tablet Take 1 tablet by mouth Daily.  5    ferrous sulfate 325 (65 FE) MG tablet Take 1 tablet by mouth Daily With Breakfast.      Fluticasone Furoate-Vilanterol (Breo Ellipta) 100-25 MCG/INH inhaler Inhale 1 puff Daily.      gabapentin (NEURONTIN) 800 MG tablet Take 1 tablet by mouth 4 (Four) Times a Day.      Insulin Glargine (BASAGLAR KWIKPEN SC) Inject 60 Units under the skin into the appropriate area as directed Every Night.      metoprolol succinate XL (TOPROL-XL) 25 MG 24 hr tablet Take 1 tablet by mouth Daily.      nitroglycerin (Nitrostat) 0.4 MG SL tablet Place 1 tablet under the tongue Every 5 (Five) Minutes As Needed for Chest Pain. 25 tablet 3     NovoLOG FlexPen 100 UNIT/ML solution pen-injector sc pen 15 Units 3 (Three) Times a Day With Meals.      pantoprazole (PROTONIX) 40 MG EC tablet Take 1 tablet by mouth Every Morning Before Breakfast.      potassium chloride 10 MEQ CR tablet Take 1 tablet by mouth Daily. (Patient taking differently: Take 4 tablets by mouth Daily.) 30 tablet 11    simethicone (MYLICON) 80 MG chewable tablet Chew 1 tablet Every 6 (Six) Hours As Needed for Flatulence.      Trulicity 0.75 MG/0.5ML solution pen-injector       Umeclidinium Bromide (Incruse Ellipta) 62.5 MCG/ACT aerosol powder  Inhale.      zolpidem (AMBIEN) 5 MG tablet Take 1 tablet by mouth At Night As Needed for Sleep.       No current facility-administered medications on file prior to visit.       ALLERGIES  Drug [acetaminophen-codeine], Hydrocodone, and Midodrine    HISTORY  Past Medical History:   Diagnosis Date    Asthma     CAD (coronary artery disease)     Catheterization in  at [facility] demonstrated 30% circumflex disease with 50% posterolateral branch.     Chest pain     CHF (congestive heart failure)     Clotting disorder     Takes blood thinners    Congenital heart disease     Hearts at 30%    COPD (chronic obstructive pulmonary disease)     Depression     Diabetes mellitus     checks bs at home daily usually; cannot always follow a diabetic diet she states due to cost of food    Dyslipidemia     Fatigue     Hiatal hernia     Hyperlipidemia     Hypertension     Myocardial infarction     No natural teeth     Peripheral vascular disease     Shortness of breath     Tobacco use     smokes 2 packs a week    Wears glasses        Social History     Socioeconomic History    Marital status:    Tobacco Use    Smoking status: Former     Current packs/day: 0.00     Average packs/day: 0.5 packs/day for 40.0 years (20.0 ttl pk-yrs)     Types: Cigarettes     Start date: 1981     Quit date: 2021     Years since quittin.7    Smokeless tobacco: Never  "   Tobacco comments:         Substance and Sexual Activity    Alcohol use: No    Drug use: No    Sexual activity: Not Currently     Partners: Male     Birth control/protection: Tubal ligation       Family History   Problem Relation Age of Onset    Hypertension Mother     Hyperlipidemia Mother     Skin cancer Mother     No Known Problems Father        Review of Systems   Constitutional:  Positive for fatigue (is worsening). Negative for chills, diaphoresis and fever.   HENT:  Positive for postnasal drip.    Eyes: Negative.  Negative for visual disturbance.   Respiratory:  Positive for cough (may be related to allergies), chest tightness, shortness of breath (smothering with exertion) and wheezing. Negative for apnea.    Cardiovascular:  Positive for chest pain (sharp at times dull other times radiates in neck and both arms have been hurting). Negative for palpitations and leg swelling.   Gastrointestinal:  Positive for blood in stool (few days ago none since).   Endocrine: Negative.  Negative for cold intolerance and heat intolerance.   Genitourinary: Negative.  Negative for hematuria.   Musculoskeletal:  Positive for arthralgias (hands) and neck pain (occas.). Negative for back pain, myalgias and neck stiffness.   Skin:  Negative for color change, rash and wound.   Allergic/Immunologic: Positive for environmental allergies (seasonal). Negative for food allergies.   Neurological:  Positive for dizziness (when getting up and sometimes while walking goes sideways), weakness, light-headedness, numbness (hands and feet) and headaches (occas.). Negative for syncope.   Hematological:  Bruises/bleeds easily.   Psychiatric/Behavioral:  Positive for sleep disturbance (insomnia does have SOB at night).        Objective     VITALS: /69 (BP Location: Right arm, Patient Position: Sitting)   Pulse 82   Ht 157.5 cm (62.01\")   Wt 87.5 kg (193 lb)   SpO2 99%   BMI 35.29 kg/m²     LABS:   Lab Results (most recent)       " None            IMAGING:   No Images in the past 120 days found..    EXAM:  Physical Exam  Vitals and nursing note reviewed.   Constitutional:       Appearance: She is well-developed.   HENT:      Head: Normocephalic.   Neck:      Thyroid: No thyroid mass.      Vascular: No carotid bruit or JVD.      Trachea: Trachea and phonation normal.   Cardiovascular:      Rate and Rhythm: Normal rate and regular rhythm.      Pulses:           Radial pulses are 2+ on the right side and 2+ on the left side.        Posterior tibial pulses are 2+ on the right side and 2+ on the left side.      Heart sounds: Murmur heard.      No friction rub. No gallop.   Pulmonary:      Effort: Pulmonary effort is normal. No respiratory distress.      Breath sounds: Normal breath sounds. No wheezing or rales.   Musculoskeletal:         General: No swelling. Normal range of motion.      Cervical back: Neck supple.   Skin:     General: Skin is warm and dry.      Capillary Refill: Capillary refill takes less than 2 seconds.      Findings: No rash.   Neurological:      Mental Status: She is alert and oriented to person, place, and time.   Psychiatric:         Speech: Speech normal.         Behavior: Behavior normal.         Thought Content: Thought content normal.         Judgment: Judgment normal.         Procedure     ECG 12 Lead    Date/Time: 4/22/2024 3:05 PM  Performed by: Maria Guadalupe Oquendo APRN    Authorized by: Maria Guadalupe Oquendo APRN  Comparison: compared with previous ECG from 11/2/2023  Similar to previous ECG  Rhythm: sinus rhythm  Rate: normal  BPM: 81  Conduction: non-specific intraventricular conduction delay  QRS axis: normal  Other findings: non-specific ST-T wave changes  Comments: QTc 420 ms  No acute changes             Assessment & Plan    Diagnosis Plan   1. HFrEF (heart failure with reduced ejection fraction)  ECG 12 Lead    metOLazone (ZAROXOLYN) 5 MG tablet    Basic Metabolic Panel    Stress Test With Myocardial Perfusion  One Day    Adult Transthoracic Echo Complete W/ Cont if Necessary Per Protocol    Duplex Carotid Ultrasound CAR      2. Shortness of breath  ECG 12 Lead    metOLazone (ZAROXOLYN) 5 MG tablet    Basic Metabolic Panel    Stress Test With Myocardial Perfusion One Day    Adult Transthoracic Echo Complete W/ Cont if Necessary Per Protocol    Duplex Carotid Ultrasound CAR      3. Precordial pain  ECG 12 Lead    metOLazone (ZAROXOLYN) 5 MG tablet    Basic Metabolic Panel    Stress Test With Myocardial Perfusion One Day    Adult Transthoracic Echo Complete W/ Cont if Necessary Per Protocol    Duplex Carotid Ultrasound CAR      4. Ischemic cardiomyopathy  ECG 12 Lead    metOLazone (ZAROXOLYN) 5 MG tablet    Basic Metabolic Panel    Stress Test With Myocardial Perfusion One Day    Adult Transthoracic Echo Complete W/ Cont if Necessary Per Protocol    Duplex Carotid Ultrasound CAR      5. Essential (primary) hypertension  ECG 12 Lead    metOLazone (ZAROXOLYN) 5 MG tablet    Basic Metabolic Panel    Stress Test With Myocardial Perfusion One Day    Adult Transthoracic Echo Complete W/ Cont if Necessary Per Protocol    Duplex Carotid Ultrasound CAR      6. Coronary artery disease of autologous vein bypass graft with stable angina pectoris  ECG 12 Lead    metOLazone (ZAROXOLYN) 5 MG tablet    Basic Metabolic Panel    Stress Test With Myocardial Perfusion One Day    Adult Transthoracic Echo Complete W/ Cont if Necessary Per Protocol    Duplex Carotid Ultrasound CAR      7. Dizzy  ECG 12 Lead    Duplex Carotid Ultrasound CAR      1.  Due to patient's extensive history of coronary artery disease and heart failure as well as worsening symptoms I do think it is appropriate to have her go under repeat ischemic workup including stress test and echocardiogram.  I would also like this on an expedited basis due to the fact of her symptoms.  2.  We will restart patient on Ranexa 500 mg twice daily to see if we can mitigate her symptoms.   Isosorbide was avoided due to the fact that this potentially could cause her to become  hypotensive.  3.  Also due to patient's random dizziness in addition to what may be related with her blood pressure I would like for her to do a carotid duplex to rule out carotid artery disease as a potential cause of her symptoms as well.  4.  Patient's blood pressure is controlled on current blood pressure medication regimen.  No medication changes are warranted at this time.  Patient advised to monitor blood pressure on a daily basis and report any persistent highs or lows.  Set goal blood pressure for patient at 130/80 or below.  5.  Due to patient's worsening swelling in which she reports it does not seem like the Lasix is working as well as what it used to we will have her take metolazone 5 mg for 5 days and repeat a BMP in 2 weeks.  She will notify me if her swelling gets worse or if the metolazone is ineffective.  6.  Informed of signs and symptoms of ACS and advised to seek emergent treatment for any new worsening symptoms.  Patient also advised sooner follow-up as needed.  Also advised to follow-up with family doctor as needed  This note is dictated utilizing voice recognition software.  Although this record has been proof read, transcriptional errors may still be present. If questions occur regarding the content of this record please do not hesitate to call our office.  I have reviewed and confirmed the accuracy of the ROS as documented by the MA/LPN/RN ANNE Parry    Return in about 4 weeks (around 5/20/2024), or if symptoms worsen or fail to improve.    Maeve A Page  reports that she quit smoking about 2 years ago. Her smoking use included cigarettes. She started smoking about 42 years ago. She has a 20 pack-year smoking history. She has never used smokeless tobacco.     Patient did not bring med list or medicine bottles to appointment, med list has been reviewed and updated based on patient's knowledge  of their meds.          MEDS ORDERED DURING VISIT:  New Medications Ordered This Visit   Medications    metOLazone (ZAROXOLYN) 5 MG tablet     Sig: Take 1 tablet by mouth Daily.     Dispense:  5 tablet     Refill:  0       DISCONTINUED MEDS DURING VISIT:   There are no discontinued medications.       This document has been electronically signed by ANNE Parry  April 23, 2024 08:23 EDT    Dictated Utilizing Dragon Dictation: Part of this note may be an electronic transcription/translation of spoken language to printed text using the Dragon Dictation System

## 2024-04-22 NOTE — TELEPHONE ENCOUNTER
Patient has been having more chest pain and having to take more Nitro. Left arm has been hurting also.  She is having SOB  also. I advised that she will have to see another provider other  than  or Dr Lloyd. They do not have openings. There is a opening  for Maria Guadalupe today at 2:30 advised that we can see then. I asked that she be put in that slot patient daughter will call back if she decides not to keep this apt. Patient was wanting to be seen before she leaves for the [place] May 4th -11th.

## 2024-04-25 ENCOUNTER — TELEPHONE (OUTPATIENT)
Dept: CARDIOLOGY | Facility: CLINIC | Age: 64
End: 2024-04-25
Payer: MEDICARE

## 2024-04-26 ENCOUNTER — APPOINTMENT (OUTPATIENT)
Dept: CARDIOLOGY | Facility: HOSPITAL | Age: 64
End: 2024-04-26
Payer: MEDICARE

## 2024-04-26 ENCOUNTER — HOSPITAL ENCOUNTER (OUTPATIENT)
Dept: CARDIOLOGY | Facility: HOSPITAL | Age: 64
Discharge: HOME | End: 2024-04-26
Payer: MEDICARE

## 2024-04-26 VITALS — HEIGHT: 62 IN | WEIGHT: 192.9 LBS | BODY MASS INDEX: 35.5 KG/M2

## 2024-04-26 DIAGNOSIS — R07.2 PRECORDIAL PAIN: ICD-10-CM

## 2024-04-26 DIAGNOSIS — R06.02 SHORTNESS OF BREATH: ICD-10-CM

## 2024-04-26 DIAGNOSIS — I10 ESSENTIAL (PRIMARY) HYPERTENSION: ICD-10-CM

## 2024-04-26 DIAGNOSIS — I25.5 ISCHEMIC CARDIOMYOPATHY: ICD-10-CM

## 2024-04-26 DIAGNOSIS — I50.20 HFREF (HEART FAILURE WITH REDUCED EJECTION FRACTION): ICD-10-CM

## 2024-04-26 DIAGNOSIS — I25.718 CORONARY ARTERY DISEASE OF AUTOLOGOUS VEIN BYPASS GRAFT WITH STABLE ANGINA PECTORIS: ICD-10-CM

## 2024-04-26 LAB
AORTIC DIMENSIONLESS INDEX: 0.62 (DI)
BH CV ECHO MEAS - ACS: 1.38 CM
BH CV ECHO MEAS - AO MAX PG: 5.6 MMHG
BH CV ECHO MEAS - AO MEAN PG: 3 MMHG
BH CV ECHO MEAS - AO ROOT DIAM: 2.5 CM
BH CV ECHO MEAS - AO V2 MAX: 117.9 CM/SEC
BH CV ECHO MEAS - AO V2 VTI: 27.4 CM
BH CV ECHO MEAS - EDV(CUBED): 108.8 ML
BH CV ECHO MEAS - EF_3D-VOL: 32 %
BH CV ECHO MEAS - EPSS: 1.71 CM
BH CV ECHO MEAS - ESV(CUBED): 64.1 ML
BH CV ECHO MEAS - FS: 16.2 %
BH CV ECHO MEAS - IVS/LVPW: 0.95 CM
BH CV ECHO MEAS - IVSD: 0.92 CM
BH CV ECHO MEAS - LA DIMENSION: 4.1 CM
BH CV ECHO MEAS - LAT PEAK E' VEL: 5.3 CM/SEC
BH CV ECHO MEAS - LV MASS(C)D: 155.1 GRAMS
BH CV ECHO MEAS - LV MAX PG: 2.11 MMHG
BH CV ECHO MEAS - LV MEAN PG: 0.98 MMHG
BH CV ECHO MEAS - LV V1 MAX: 72.6 CM/SEC
BH CV ECHO MEAS - LV V1 VTI: 16.9 CM
BH CV ECHO MEAS - LVIDD: 4.8 CM
BH CV ECHO MEAS - LVIDS: 4 CM
BH CV ECHO MEAS - LVPWD: 0.96 CM
BH CV ECHO MEAS - MED PEAK E' VEL: 4.3 CM/SEC
BH CV ECHO MEAS - MV A MAX VEL: 96.1 CM/SEC
BH CV ECHO MEAS - MV DEC SLOPE: 355.3 CM/SEC2
BH CV ECHO MEAS - MV DEC TIME: 0.19 SEC
BH CV ECHO MEAS - MV E MAX VEL: 64.3 CM/SEC
BH CV ECHO MEAS - MV E/A: 0.67
BH CV ECHO MEAS - MV MAX PG: 4.9 MMHG
BH CV ECHO MEAS - MV MEAN PG: 1.84 MMHG
BH CV ECHO MEAS - MV P1/2T: 54.9 MSEC
BH CV ECHO MEAS - MV V2 VTI: 22.9 CM
BH CV ECHO MEAS - MVA(P1/2T): 4 CM2
BH CV ECHO MEAS - PA V2 MAX: 86 CM/SEC
BH CV ECHO MEAS - RV MAX PG: 0.99 MMHG
BH CV ECHO MEAS - RV V1 MAX: 49.7 CM/SEC
BH CV ECHO MEAS - RV V1 VTI: 10.6 CM
BH CV ECHO MEAS - TAPSE (>1.6): 1.73 CM
BH CV ECHO MEASUREMENTS AVERAGE E/E' RATIO: 13.4
BH CV REST NUCLEAR ISOTOPE DOSE: 10 MCI
BH CV STRESS COMMENTS STAGE 1: NORMAL
BH CV STRESS DOSE REGADENOSON STAGE 1: 0.4
BH CV STRESS DURATION MIN STAGE 1: 0
BH CV STRESS DURATION SEC STAGE 1: 10
BH CV STRESS NUCLEAR ISOTOPE DOSE: 30 MCI
BH CV STRESS PROTOCOL 1: NORMAL
BH CV STRESS RECOVERY BP: NORMAL MMHG
BH CV STRESS RECOVERY HR: 84 BPM
BH CV STRESS STAGE 1: 1
BH CV XLRA - TDI S': 7.7 CM/SEC
MAXIMAL PREDICTED HEART RATE: 158 BPM
PERCENT MAX PREDICTED HR: 54.43 %
SINUS: 2.5 CM
STRESS BASELINE BP: NORMAL MMHG
STRESS BASELINE HR: 80 BPM
STRESS PERCENT HR: 64 %
STRESS POST PEAK BP: NORMAL MMHG
STRESS POST PEAK HR: 86 BPM
STRESS TARGET HR: 134 BPM

## 2024-04-26 PROCEDURE — 93017 CV STRESS TEST TRACING ONLY: CPT

## 2024-04-26 PROCEDURE — 25010000002 REGADENOSON 0.4 MG/5ML SOLUTION: Performed by: INTERNAL MEDICINE

## 2024-04-26 PROCEDURE — A9500 TC99M SESTAMIBI: HCPCS | Performed by: INTERNAL MEDICINE

## 2024-04-26 PROCEDURE — 0 TECHNETIUM SESTAMIBI: Performed by: INTERNAL MEDICINE

## 2024-04-26 PROCEDURE — 93306 TTE W/DOPPLER COMPLETE: CPT

## 2024-04-26 PROCEDURE — 78452 HT MUSCLE IMAGE SPECT MULT: CPT

## 2024-04-26 RX ORDER — RANOLAZINE 500 MG/1
500 TABLET, EXTENDED RELEASE ORAL 2 TIMES DAILY
Qty: 60 TABLET | Refills: 11 | Status: SHIPPED | OUTPATIENT
Start: 2024-04-26

## 2024-04-26 RX ORDER — REGADENOSON 0.08 MG/ML
0.4 INJECTION, SOLUTION INTRAVENOUS
Status: COMPLETED | OUTPATIENT
Start: 2024-04-26 | End: 2024-04-26

## 2024-04-26 RX ADMIN — TECHNETIUM TC 99M SESTAMIBI 1 DOSE: 1 INJECTION INTRAVENOUS at 09:08

## 2024-04-26 RX ADMIN — TECHNETIUM TC 99M SESTAMIBI 1 DOSE: 1 INJECTION INTRAVENOUS at 10:27

## 2024-04-26 RX ADMIN — REGADENOSON 0.4 MG: 0.08 INJECTION, SOLUTION INTRAVENOUS at 10:27

## 2024-04-30 LAB
BH CV REST NUCLEAR ISOTOPE DOSE: 10 MCI
BH CV STRESS COMMENTS STAGE 1: NORMAL
BH CV STRESS DOSE REGADENOSON STAGE 1: 0.4
BH CV STRESS DURATION MIN STAGE 1: 0
BH CV STRESS DURATION SEC STAGE 1: 10
BH CV STRESS NUCLEAR ISOTOPE DOSE: 30 MCI
BH CV STRESS PROTOCOL 1: NORMAL
BH CV STRESS RECOVERY BP: NORMAL MMHG
BH CV STRESS RECOVERY HR: 84 BPM
BH CV STRESS STAGE 1: 1
MAXIMAL PREDICTED HEART RATE: 158 BPM
PERCENT MAX PREDICTED HR: 54.43 %
STRESS BASELINE BP: NORMAL MMHG
STRESS BASELINE HR: 80 BPM
STRESS PERCENT HR: 64 %
STRESS POST PEAK BP: NORMAL MMHG
STRESS POST PEAK HR: 86 BPM
STRESS TARGET HR: 134 BPM

## 2024-05-01 ENCOUNTER — HOSPITAL ENCOUNTER (OUTPATIENT)
Dept: CARDIOLOGY | Facility: HOSPITAL | Age: 64
Discharge: HOME | End: 2024-05-01
Payer: MEDICARE

## 2024-05-01 ENCOUNTER — TELEPHONE (OUTPATIENT)
Dept: CARDIOLOGY | Facility: CLINIC | Age: 64
End: 2024-05-01
Payer: MEDICARE

## 2024-05-01 DIAGNOSIS — R42 DIZZY: ICD-10-CM

## 2024-05-01 DIAGNOSIS — R06.02 SHORTNESS OF BREATH: ICD-10-CM

## 2024-05-01 DIAGNOSIS — R07.2 PRECORDIAL PAIN: ICD-10-CM

## 2024-05-01 DIAGNOSIS — I25.718 CORONARY ARTERY DISEASE OF AUTOLOGOUS VEIN BYPASS GRAFT WITH STABLE ANGINA PECTORIS: ICD-10-CM

## 2024-05-01 DIAGNOSIS — I50.20 HFREF (HEART FAILURE WITH REDUCED EJECTION FRACTION): ICD-10-CM

## 2024-05-01 DIAGNOSIS — I25.5 ISCHEMIC CARDIOMYOPATHY: ICD-10-CM

## 2024-05-01 DIAGNOSIS — I10 ESSENTIAL (PRIMARY) HYPERTENSION: ICD-10-CM

## 2024-05-01 LAB
BH CV XLRA MEAS LEFT CAROTID BULB EDV: 12.5 CM/SEC
BH CV XLRA MEAS LEFT CAROTID BULB PSV: 66.6 CM/SEC
BH CV XLRA MEAS LEFT DIST CCA EDV: 22 CM/SEC
BH CV XLRA MEAS LEFT DIST CCA PSV: 76.8 CM/SEC
BH CV XLRA MEAS LEFT DIST ICA EDV: -40.7 CM/SEC
BH CV XLRA MEAS LEFT DIST ICA PSV: -141 CM/SEC
BH CV XLRA MEAS LEFT ICA/CCA RATIO: 1.8
BH CV XLRA MEAS LEFT MID ICA EDV: -29.4 CM/SEC
BH CV XLRA MEAS LEFT MID ICA PSV: -99.5 CM/SEC
BH CV XLRA MEAS LEFT PROX CCA EDV: 19.4 CM/SEC
BH CV XLRA MEAS LEFT PROX CCA PSV: 119 CM/SEC
BH CV XLRA MEAS LEFT PROX ECA PSV: -94.1 CM/SEC
BH CV XLRA MEAS LEFT PROX ICA EDV: -28.5 CM/SEC
BH CV XLRA MEAS LEFT PROX ICA PSV: -80.1 CM/SEC
BH CV XLRA MEAS LEFT VERTEBRAL A EDV: -14.7 CM/SEC
BH CV XLRA MEAS LEFT VERTEBRAL A PSV: -49.1 CM/SEC
BH CV XLRA MEAS RIGHT CAROTID BULB EDV: -18.7 CM/SEC
BH CV XLRA MEAS RIGHT CAROTID BULB PSV: -56.5 CM/SEC
BH CV XLRA MEAS RIGHT DIST CCA EDV: 14.8 CM/SEC
BH CV XLRA MEAS RIGHT DIST CCA PSV: 66.4 CM/SEC
BH CV XLRA MEAS RIGHT DIST ICA EDV: -32.1 CM/SEC
BH CV XLRA MEAS RIGHT DIST ICA PSV: -107 CM/SEC
BH CV XLRA MEAS RIGHT ICA/CCA RATIO: 1.7
BH CV XLRA MEAS RIGHT MID ICA EDV: -34.5 CM/SEC
BH CV XLRA MEAS RIGHT MID ICA PSV: -111 CM/SEC
BH CV XLRA MEAS RIGHT PROX CCA EDV: 14.3 CM/SEC
BH CV XLRA MEAS RIGHT PROX CCA PSV: 83.9 CM/SEC
BH CV XLRA MEAS RIGHT PROX ECA PSV: -91.6 CM/SEC
BH CV XLRA MEAS RIGHT PROX ICA EDV: -24.1 CM/SEC
BH CV XLRA MEAS RIGHT PROX ICA PSV: -76.3 CM/SEC
BH CV XLRA MEAS RIGHT VERTEBRAL A EDV: -15.4 CM/SEC
BH CV XLRA MEAS RIGHT VERTEBRAL A PSV: -42 CM/SEC

## 2024-05-01 PROCEDURE — 93880 EXTRACRANIAL BILAT STUDY: CPT

## 2024-05-01 NOTE — TELEPHONE ENCOUNTER
I called patient and went over Stress test results with Amy as follows:    No clear-cut evidence of ischemia.  Post-rest EF 39%.  Comparable with previous.  Keep follow-up.

## 2024-05-04 LAB
AORTIC DIMENSIONLESS INDEX: 0.62 (DI)
BH CV ECHO MEAS - ACS: 1.38 CM
BH CV ECHO MEAS - AO MAX PG: 5.6 MMHG
BH CV ECHO MEAS - AO MEAN PG: 3 MMHG
BH CV ECHO MEAS - AO ROOT DIAM: 2.5 CM
BH CV ECHO MEAS - AO V2 MAX: 117.9 CM/SEC
BH CV ECHO MEAS - AO V2 VTI: 27.4 CM
BH CV ECHO MEAS - EDV(CUBED): 108.8 ML
BH CV ECHO MEAS - EF(MOD-BP): 34 %
BH CV ECHO MEAS - EF_3D-VOL: 32 %
BH CV ECHO MEAS - EPSS: 1.71 CM
BH CV ECHO MEAS - ESV(CUBED): 64.1 ML
BH CV ECHO MEAS - FS: 16.2 %
BH CV ECHO MEAS - IVS/LVPW: 0.95 CM
BH CV ECHO MEAS - IVSD: 0.92 CM
BH CV ECHO MEAS - LA DIMENSION: 4.1 CM
BH CV ECHO MEAS - LAT PEAK E' VEL: 5.3 CM/SEC
BH CV ECHO MEAS - LV MASS(C)D: 155.1 GRAMS
BH CV ECHO MEAS - LV MAX PG: 2.11 MMHG
BH CV ECHO MEAS - LV MEAN PG: 0.98 MMHG
BH CV ECHO MEAS - LV V1 MAX: 72.6 CM/SEC
BH CV ECHO MEAS - LV V1 VTI: 16.9 CM
BH CV ECHO MEAS - LVIDD: 4.8 CM
BH CV ECHO MEAS - LVIDS: 4 CM
BH CV ECHO MEAS - LVPWD: 0.96 CM
BH CV ECHO MEAS - MED PEAK E' VEL: 4.3 CM/SEC
BH CV ECHO MEAS - MV A MAX VEL: 96.1 CM/SEC
BH CV ECHO MEAS - MV DEC SLOPE: 355.3 CM/SEC2
BH CV ECHO MEAS - MV DEC TIME: 0.19 SEC
BH CV ECHO MEAS - MV E MAX VEL: 64.3 CM/SEC
BH CV ECHO MEAS - MV E/A: 0.67
BH CV ECHO MEAS - MV MAX PG: 4.9 MMHG
BH CV ECHO MEAS - MV MEAN PG: 1.84 MMHG
BH CV ECHO MEAS - MV P1/2T: 54.9 MSEC
BH CV ECHO MEAS - MV V2 VTI: 22.9 CM
BH CV ECHO MEAS - MVA(P1/2T): 4 CM2
BH CV ECHO MEAS - PA V2 MAX: 86 CM/SEC
BH CV ECHO MEAS - RV MAX PG: 0.99 MMHG
BH CV ECHO MEAS - RV V1 MAX: 49.7 CM/SEC
BH CV ECHO MEAS - RV V1 VTI: 10.6 CM
BH CV ECHO MEAS - TAPSE (>1.6): 1.73 CM
BH CV ECHO MEASUREMENTS AVERAGE E/E' RATIO: 13.4
BH CV XLRA - TDI S': 7.7 CM/SEC
SINUS: 2.5 CM

## 2024-05-06 ENCOUNTER — TELEPHONE (OUTPATIENT)
Dept: CARDIOLOGY | Facility: CLINIC | Age: 64
End: 2024-05-06
Payer: MEDICARE

## 2024-05-07 ENCOUNTER — TELEPHONE (OUTPATIENT)
Dept: CARDIOLOGY | Facility: CLINIC | Age: 64
End: 2024-05-07
Payer: MEDICARE

## 2024-05-15 ENCOUNTER — TELEPHONE (OUTPATIENT)
Dept: CARDIOLOGY | Facility: CLINIC | Age: 64
End: 2024-05-15
Payer: MEDICARE

## 2024-05-15 DIAGNOSIS — I65.21 OCCLUSION AND STENOSIS OF RIGHT CAROTID ARTERY: ICD-10-CM

## 2024-05-15 DIAGNOSIS — I65.21 STENOSIS OF RIGHT CAROTID ARTERY: Primary | ICD-10-CM

## 2024-05-15 NOTE — TELEPHONE ENCOUNTER
I called and spoke with Amy patient daughter advised of Carotid Ultrasound results as follows:    Due to possible total occlusion of the right common carotid artery in its midportion I would like to get a CTA for further evaluation.  Also will need to get a BMP prior.

## 2024-05-16 ENCOUNTER — TELEPHONE (OUTPATIENT)
Dept: CARDIOLOGY | Facility: CLINIC | Age: 64
End: 2024-05-16
Payer: MEDICARE

## 2024-05-16 NOTE — TELEPHONE ENCOUNTER
[facility] called stating pt was there for carotid imaging and was below threshold for GFR, pt was 28 and cut of is 30.   Stated they can proceed, but with hydration or can do w/ US.       Placed on hold to speak w/ JR. Per JR: Hydration okay, use caution and do so slowly due to low Ef.       I informed hospital staff and they verbalized understanding.

## 2024-05-17 DIAGNOSIS — I65.21 STENOSIS OF RIGHT CAROTID ARTERY: ICD-10-CM

## 2024-05-17 DIAGNOSIS — I65.21 OCCLUSION AND STENOSIS OF RIGHT CAROTID ARTERY: ICD-10-CM

## 2024-05-21 ENCOUNTER — OFFICE VISIT (OUTPATIENT)
Dept: CARDIOLOGY | Facility: CLINIC | Age: 64
End: 2024-05-21
Payer: MEDICARE

## 2024-05-21 VITALS
SYSTOLIC BLOOD PRESSURE: 113 MMHG | BODY MASS INDEX: 34.82 KG/M2 | DIASTOLIC BLOOD PRESSURE: 73 MMHG | WEIGHT: 189.2 LBS | HEART RATE: 88 BPM | HEIGHT: 62 IN | OXYGEN SATURATION: 93 %

## 2024-05-21 DIAGNOSIS — I25.84 CORONARY ARTERY DISEASE DUE TO CALCIFIED CORONARY LESION: ICD-10-CM

## 2024-05-21 DIAGNOSIS — R06.02 SHORTNESS OF BREATH: ICD-10-CM

## 2024-05-21 DIAGNOSIS — I50.20 HFREF (HEART FAILURE WITH REDUCED EJECTION FRACTION): ICD-10-CM

## 2024-05-21 DIAGNOSIS — I10 ESSENTIAL (PRIMARY) HYPERTENSION: ICD-10-CM

## 2024-05-21 DIAGNOSIS — R07.2 PRECORDIAL PAIN: ICD-10-CM

## 2024-05-21 DIAGNOSIS — E78.5 DYSLIPIDEMIA: ICD-10-CM

## 2024-05-21 DIAGNOSIS — I25.10 CORONARY ARTERY DISEASE DUE TO CALCIFIED CORONARY LESION: ICD-10-CM

## 2024-05-21 DIAGNOSIS — I25.5 ISCHEMIC CARDIOMYOPATHY: Primary | ICD-10-CM

## 2024-05-21 RX ORDER — POTASSIUM CHLORIDE 20 MEQ/1
20 TABLET, EXTENDED RELEASE ORAL 2 TIMES DAILY
COMMUNITY

## 2024-05-21 RX ORDER — ALBUTEROL SULFATE 90 UG/1
2 AEROSOL, METERED RESPIRATORY (INHALATION) EVERY 4 HOURS PRN
COMMUNITY

## 2024-05-21 RX ORDER — BUMETANIDE 2 MG/1
2 TABLET ORAL 2 TIMES DAILY
COMMUNITY

## 2024-05-21 NOTE — PROGRESS NOTES
Subjective     Maeve Yates is a 63 y.o. female who presents to day for 4 week testing follow up .    CHIEF COMPLIANT  Chief Complaint   Patient presents with    4 week testing follow up        Active Problems:  Problem List Items Addressed This Visit          Cardiac and Vasculature    Chest pain    Essential (primary) hypertension    Relevant Medications    bumetanide (BUMEX) 2 MG tablet    CAD (coronary artery disease)    Dyslipidemia    Cardiomyopathy - Primary       Pulmonary and Pneumonias    Shortness of breath     Other Visit Diagnoses       HFrEF (heart failure with reduced ejection fraction)            Active Problems:  1. Coronary artery disease. Catheterization in 2009 at [facility] demonstrated 30%  circumflex disease with 50% posterolateral branch.   1.1 follow-up catheterization in September 2016 because of non-ST elevation myocardial infarction demonstrated a total occlusion of the right coronary artery with 90% high-grade LAD disease and 50-70% circumflex disease. Stenting of the LAD was performed. Medical management recommended and percutaneous intervention in the circumflex if patient fails medical therapy.  1.2 heart cath 3/17: stenting of the circumflex. Patent stent to the LAD with chronic total occlusion of the right coronary artery. Medical management recommended  1.3 left heart catheterization 6/20: Left main normal, LAD 30% in-stent stenosis with left-to-right collaterals, circumflex patent stent, left to right collaterals, % occluded, EF 50 to 55%  1.4 Left heart Catheterization 11/24/21: LAD 95% in-stent stenosis with angioplasty that left minimal residual stenosis,  of the RCA that was unable to be crossed, 90% septal , EF less than 30%  1.5 left heart cath 7/23:  successful stenting of  in the proximal third of the LAD related to intra-stent restenosis.  EF 30-35% with global hypokinesis.     1.6 Stress test 4/24: No clear-cut ischemic defects with a  moderately depressed post stress EF of 39% with mild global hypokinesis and near akinesis of the septal and lateral walls.  2. Ischemic cardiomyopathy  2.1 echocardiogram 1/22: EF 25 to 30% dilated left and right atrium, no significant valvular disease  2.2 echocardiogram 4/24: EF 35±5%, grade 1A diastolic dysfunction, trivial MR  3. Hypertension.   4. Chronic tobacco habituation.   5. Dyslipidemia.   6.  Angioplasty to left lower extremity per Dr. White 8/2/17    HPI  HPI  Mrs. Maeve Yates is a 63-year-old female who is being followed up today for history of coronary artery disease, chronic arterial hypertension, and testing results.    Patient does have a history of coronary artery disease and which most recently her left heart catheterization included successful stenting of his  in the proximal third of the LAD related to IntraStent restenosis.  She also has a residual  of the RCA which previously was unable to be stented.  She is on dual antiplatelet therapy of aspirin and Plavix.  High intensity statin therapy of atorvastatin.  She has previously been on Ranexa and isosorbide for antianginal therapy however this was discontinued due to patient request due to the quantity of medications she is taken.  They did not render her any significant relief of the chest pain.    Patient also has a history of chronic arterial hypertension in which she is being treated with metoprolol.  Patient's blood pressure is 113/73 heart rate of 88.    Patient does have heart failure with reduced ejection fraction which she most recently has an ejection fraction of 30 to 35%.  This was in July 2023.  She is currently on metoprolol succinate and on Farxiga.  Entresto had to be discontinued due to hypotension.  She does have an AICD.    Patient did go under repeat testing including echocardiogram.  Patient's echocardiogram showed an ejection fraction of 35±5% with inferior and posterior lateral hypokinesis with grade 1  diastolic dysfunction.  Trivial MR and physiological TR.  Stress test did not show any clear-cut ischemic defects with a moderately depressed post stress EF of 39% with mild global hypokinesis and near akinesis of the septal and lateral walls.  Carotid duplex showed probable probable total occlusion of the right common carotid artery at its midportion.  Otherwise no hemodynamically significant stenosis in the remaining carotid system.  This was followed by a CTA of the carotids that showed no significant stenosis at any level of the carotid arterial tree.    Patient does report some shortness of breath on exertion such as she is out doing stuff.  She says that if she is out walking.  She says however overall she is being much more active and is feeling quite a bit better.  She denies any dyspnea at rest or any orthopnea.    Patient does have an episode of chest pressure that occurred intermittently and is short-lived.  When she lay down in bed she had around her ICD site.  She says she repositioned and the pain resolved.    PRIOR MEDS  Current Outpatient Medications on File Prior to Visit   Medication Sig Dispense Refill    albuterol sulfate  (90 Base) MCG/ACT inhaler Inhale 2 puffs Every 4 (Four) Hours As Needed for Wheezing.      aspirin 81 MG EC tablet TAKE ONE TABLET BY MOUTH DAILY 30 tablet 11    atorvastatin (LIPITOR) 40 MG tablet Take 1 tablet by mouth every night at bedtime.      bumetanide (BUMEX) 2 MG tablet Take 1 tablet by mouth 2 (Two) Times a Day.      cetirizine (zyrTEC) 10 MG tablet Take 1 tablet by mouth Daily.      citalopram (CeleXA) 40 MG tablet Take 1 tablet by mouth Daily.  1    clopidogrel (PLAVIX) 75 MG tablet TAKE 1 TABLET BY MOUTH DAILY. 90 tablet 3    docusate sodium (COLACE) 250 MG capsule Take 1 capsule by mouth Daily.      Farxiga 10 MG tablet TAKE 1 TABLET BY MOUTH DAILY 30 tablet 11    fenofibrate (TRICOR) 145 MG tablet Take 1 tablet by mouth Daily.  5    ferrous sulfate 325  (65 FE) MG tablet Take 1 tablet by mouth Daily With Breakfast.      Fluticasone Furoate-Vilanterol (Breo Ellipta) 100-25 MCG/INH inhaler Inhale 1 puff Daily.      gabapentin (NEURONTIN) 800 MG tablet Take 1 tablet by mouth 3 (Three) Times a Day.      Insulin Glargine (BASAGLAR KWIKPEN SC) Inject 60 Units under the skin into the appropriate area as directed Every Night.      metoprolol succinate XL (TOPROL-XL) 25 MG 24 hr tablet Take 1 tablet by mouth Daily.      nitroglycerin (Nitrostat) 0.4 MG SL tablet Place 1 tablet under the tongue Every 5 (Five) Minutes As Needed for Chest Pain. 25 tablet 3    NovoLOG FlexPen 100 UNIT/ML solution pen-injector sc pen 15 Units 3 (Three) Times a Day With Meals.      pantoprazole (PROTONIX) 40 MG EC tablet Take 1 tablet by mouth Every Morning Before Breakfast.      potassium chloride (KLOR-CON M20) 20 MEQ CR tablet Take 1 tablet by mouth 2 (Two) Times a Day.      ranolazine (Ranexa) 500 MG 12 hr tablet Take 1 tablet by mouth 2 (Two) Times a Day. 60 tablet 11    Trulicity 0.75 MG/0.5ML solution pen-injector       Umeclidinium Bromide (Incruse Ellipta) 62.5 MCG/ACT aerosol powder  Inhale.      [DISCONTINUED] potassium chloride 10 MEQ CR tablet Take 1 tablet by mouth Daily. (Patient taking differently: Take 4 tablets by mouth Daily.) 30 tablet 11    bumetanide (BUMEX) 2 MG tablet Take 1 tablet by mouth Daily. (Patient taking differently: Take 1 tablet by mouth 2 (Two) Times a Day. 1 tablet bid) 90 tablet 3    [DISCONTINUED] metOLazone (ZAROXOLYN) 5 MG tablet Take 1 tablet by mouth Daily. 5 tablet 0    [DISCONTINUED] simethicone (MYLICON) 80 MG chewable tablet Chew 1 tablet Every 6 (Six) Hours As Needed for Flatulence.      [DISCONTINUED] zolpidem (AMBIEN) 5 MG tablet Take 1 tablet by mouth At Night As Needed for Sleep.       No current facility-administered medications on file prior to visit.       ALLERGIES  Drug [acetaminophen-codeine], Hydrocodone, and Midodrine    HISTORY  Past  Medical History:   Diagnosis Date    Asthma     CAD (coronary artery disease)     Catheterization in  at [facility] demonstrated 30% circumflex disease with 50% posterolateral branch.     Chest pain     CHF (congestive heart failure)     Clotting disorder     Takes blood thinners    Congenital heart disease     Hearts at 30%    COPD (chronic obstructive pulmonary disease)     Depression     Diabetes mellitus     checks bs at home daily usually; cannot always follow a diabetic diet she states due to cost of food    Dyslipidemia     Fatigue     Hiatal hernia     Hyperlipidemia     Hypertension     Myocardial infarction     No natural teeth     Peripheral vascular disease     Shortness of breath     Tobacco use     smokes 2 packs a week    Wears glasses        Social History     Socioeconomic History    Marital status:    Tobacco Use    Smoking status: Former     Current packs/day: 0.00     Average packs/day: 0.5 packs/day for 40.0 years (20.0 ttl pk-yrs)     Types: Cigarettes     Start date: 1981     Quit date: 2021     Years since quittin.7    Smokeless tobacco: Never    Tobacco comments:         Substance and Sexual Activity    Alcohol use: No    Drug use: No    Sexual activity: Not Currently     Partners: Male     Birth control/protection: Tubal ligation       Family History   Problem Relation Age of Onset    Hypertension Mother     Hyperlipidemia Mother     Skin cancer Mother     No Known Problems Father        Review of Systems   Constitutional:  Positive for fatigue. Negative for chills and fever.   HENT:  Negative for congestion, rhinorrhea and sore throat.    Eyes:  Negative for visual disturbance.   Respiratory:  Positive for shortness of breath (with exertion). Negative for apnea and chest tightness.    Cardiovascular:  Positive for chest pain (pressure in chest at times). Negative for leg swelling.   Gastrointestinal:  Positive for constipation. Negative for diarrhea and nausea.  "  Musculoskeletal:  Positive for arthralgias and back pain. Negative for neck pain.   Allergic/Immunologic: Positive for environmental allergies. Negative for food allergies.   Neurological:  Positive for dizziness (getting up and down bending over), weakness and light-headedness. Negative for syncope.   Hematological:  Bruises/bleeds easily.   Psychiatric/Behavioral:  Positive for sleep disturbance (No SOB at night does not sleep well).        Objective     VITALS: /73 (BP Location: Left arm, Patient Position: Sitting, Cuff Size: Adult)   Pulse 88   Ht 157 cm (61.81\")   Wt 85.8 kg (189 lb 3.2 oz)   SpO2 93%   BMI 34.82 kg/m²     LABS:   Lab Results (most recent)       None            IMAGING:   No Images in the past 120 days found..    EXAM:  Physical Exam  Vitals and nursing note reviewed.   Constitutional:       Appearance: She is well-developed.   HENT:      Head: Normocephalic.   Neck:      Thyroid: No thyroid mass.      Vascular: No carotid bruit or JVD.      Trachea: Trachea and phonation normal.   Cardiovascular:      Rate and Rhythm: Normal rate and regular rhythm.      Pulses:           Radial pulses are 2+ on the right side and 2+ on the left side.        Posterior tibial pulses are 2+ on the right side and 2+ on the left side.      Heart sounds: Normal heart sounds. Murmur heard.      No friction rub. No gallop.   Pulmonary:      Effort: Pulmonary effort is normal. No respiratory distress.      Breath sounds: Normal breath sounds. No wheezing or rales.   Musculoskeletal:         General: No swelling. Normal range of motion.      Cervical back: Neck supple.   Skin:     General: Skin is warm and dry.      Capillary Refill: Capillary refill takes less than 2 seconds.      Findings: No rash.   Neurological:      Mental Status: She is alert and oriented to person, place, and time.   Psychiatric:         Speech: Speech normal.         Behavior: Behavior normal.         Thought Content: Thought " content normal.         Judgment: Judgment normal.         Procedure   Procedures       Assessment & Plan    Diagnosis Plan   1. Ischemic cardiomyopathy        2. HFrEF (heart failure with reduced ejection fraction)        3. Coronary artery disease due to calcified coronary lesion        4. Essential (primary) hypertension        5. Dyslipidemia        6. Precordial pain        7. Shortness of breath        1.  Patient does have ischemic cardiomyopathy in which she is on max tolerated therapy.  She is on metoprolol and Farxiga.  Entresto had to be discontinued due to hypotension.  Her blood pressure is starting to recover and we will try to start ACE/ARB/ARNI therapy in the near future.  She does have an AICD in place.  2.  Patient's coronary artery disease seems to be stable.  She does have some shortness of breath with exertion but no typical angina signs.  Will continue to follow at this time.  3.  Patient's blood pressure is controlled on current blood pressure medication regimen.  No medication changes are warranted at this time.  Patient advised to monitor blood pressure on a daily basis and report any persistent highs or lows.  Set goal blood pressure for patient at 130/80 or below.  4.  Informed of signs and symptoms of ACS and advised to seek emergent treatment for any new worsening symptoms.  Patient also advised sooner follow-up as needed.  Also advised to follow-up with family doctor as needed  This note is dictated utilizing voice recognition software.  Although this record has been proof read, transcriptional errors may still be present. If questions occur regarding the content of this record please do not hesitate to call our office.  I have reviewed and confirmed the accuracy of the ROS as documented by the MA/ARA/RN ANNE Parry    No follow-ups on file.    Diagnoses and all orders for this visit:    1. Ischemic cardiomyopathy (Primary)    2. HFrEF (heart failure with reduced ejection  fraction)    3. Coronary artery disease due to calcified coronary lesion    4. Essential (primary) hypertension    5. Dyslipidemia    6. Precordial pain    7. Shortness of breath        Maeve KEARNEY Page  reports that she quit smoking about 2 years ago. Her smoking use included cigarettes. She started smoking about 42 years ago. She has a 20 pack-year smoking history. She has never used smokeless tobacco. I have educated her on the risk of diseases from using tobacco products. Patient does not smoke.          Class 2 Severe Obesity (BMI >=35 and <=39.9). . We discussed portion control and increasing exercise.           MEDS ORDERED DURING VISIT:  No orders of the defined types were placed in this encounter.          This document has been electronically signed by Marshall Oquendo Jr., APRN  May 21, 2024 15:10 EDT

## 2024-06-06 ENCOUNTER — TELEPHONE (OUTPATIENT)
Dept: CARDIOLOGY | Facility: CLINIC | Age: 64
End: 2024-06-06
Payer: MEDICARE

## 2024-06-07 RX ORDER — METOPROLOL SUCCINATE 25 MG/1
12.5 TABLET, EXTENDED RELEASE ORAL DAILY
Qty: 45 TABLET | Refills: 3 | Status: SHIPPED | OUTPATIENT
Start: 2024-06-07

## 2024-06-07 NOTE — TELEPHONE ENCOUNTER
Called and informed pt's daughter of message from .      She asked about Ranexa causing pt's kidneys problems and that they were advised to stop. I explained that Ranexa helps prevent CP and that they need to notify us if that becomes an issue.

## 2024-08-26 ENCOUNTER — OFFICE VISIT (OUTPATIENT)
Dept: CARDIOLOGY | Facility: CLINIC | Age: 64
End: 2024-08-26
Payer: MEDICARE

## 2024-08-26 VITALS
BODY MASS INDEX: 34.93 KG/M2 | OXYGEN SATURATION: 93 % | DIASTOLIC BLOOD PRESSURE: 81 MMHG | WEIGHT: 189.8 LBS | HEART RATE: 86 BPM | SYSTOLIC BLOOD PRESSURE: 126 MMHG | HEIGHT: 62 IN

## 2024-08-26 DIAGNOSIS — I25.10 CORONARY ARTERY DISEASE DUE TO CALCIFIED CORONARY LESION: ICD-10-CM

## 2024-08-26 DIAGNOSIS — I25.84 CORONARY ARTERY DISEASE DUE TO CALCIFIED CORONARY LESION: ICD-10-CM

## 2024-08-26 DIAGNOSIS — I50.20 HFREF (HEART FAILURE WITH REDUCED EJECTION FRACTION): Primary | ICD-10-CM

## 2024-08-26 DIAGNOSIS — I10 ESSENTIAL (PRIMARY) HYPERTENSION: ICD-10-CM

## 2024-08-26 DIAGNOSIS — R42 DIZZY: ICD-10-CM

## 2024-08-26 DIAGNOSIS — Z95.810 AICD (AUTOMATIC CARDIOVERTER/DEFIBRILLATOR) PRESENT: ICD-10-CM

## 2024-08-26 PROCEDURE — 99214 OFFICE O/P EST MOD 30 MIN: CPT | Performed by: CLINICAL NURSE SPECIALIST

## 2024-08-26 PROCEDURE — 3079F DIAST BP 80-89 MM HG: CPT | Performed by: CLINICAL NURSE SPECIALIST

## 2024-08-26 PROCEDURE — 3074F SYST BP LT 130 MM HG: CPT | Performed by: CLINICAL NURSE SPECIALIST

## 2024-08-26 RX ORDER — FEXOFENADINE HCL 180 MG/1
180 TABLET ORAL DAILY
COMMUNITY

## 2024-08-26 RX ORDER — LACTULOSE 10 G/15ML
30 SOLUTION ORAL; RECTAL 2 TIMES DAILY
COMMUNITY
Start: 2024-08-21

## 2024-08-26 NOTE — PROGRESS NOTES
Subjective     Maeve Yates is a 64 y.o. female who presents today for Follow-up ([facility] follow up).    CHIEF COMPLIANT  Chief Complaint   Patient presents with    Follow-up     [facility] follow up       Active Problems:  1. Coronary artery disease. Catheterization in 2009 at [facility] demonstrated 30%  circumflex disease with 50% posterolateral branch.   1.1 follow-up catheterization in September 2016 because of non-ST elevation myocardial infarction demonstrated a total occlusion of the right coronary artery with 90% high-grade LAD disease and 50-70% circumflex disease. Stenting of the LAD was performed. Medical management recommended and percutaneous intervention in the circumflex if patient fails medical therapy.  1.2 heart cath 3/17: stenting of the circumflex. Patent stent to the LAD with chronic total occlusion of the right coronary artery. Medical management recommended  1.3 left heart catheterization 6/20: Left main normal, LAD 30% in-stent stenosis with left-to-right collaterals, circumflex patent stent, left to right collaterals, % occluded, EF 50 to 55%  1.4 Left heart Catheterization 11/24/21: LAD 95% in-stent stenosis with angioplasty that left minimal residual stenosis,  of the RCA that was unable to be crossed, 90% septal , EF less than 30%  1.5 left heart cath 7/23:  successful stenting of  in the proximal third of the LAD related to intra-stent restenosis.  EF 30-35% with global hypokinesis.     1.6 Stress test 4/24: No clear-cut ischemic defects with a moderately depressed post stress EF of 39% with mild global hypokinesis and near akinesis of the septal and lateral walls.  2. Ischemic cardiomyopathy s/p AICD   2.1 echocardiogram 1/22: EF 25 to 30% dilated left and right atrium, no significant valvular disease  2.2 echocardiogram 4/24: EF 35±5%, grade 1A diastolic dysfunction, trivial MR  3. Hypertension.   4. Chronic tobacco habituation.   5. Dyslipidemia.   6.  Angioplasty  to left lower extremity per Dr. White 8/2/17    HPI  The patient is a 64-year-old female that returns for follow-up.  She did have an emergency department visit in June at [facility].  She states she was sent from primary care for abnormal EKG.  She states she ended up being told that the change on her EKG was not new.  The patient remains with some intermittent chest pain but states this has improved since her last visit and is not occurring as often.  She does have sublingual nitroglycerin which she will take if needed but states she has not had to take this very often.  She denies worsening dyspnea, syncope or palpitations.  Her most recent remote AICD interrogation at the end of July has good parameters with 6 years and 8 months left on battery life.  The patient is having some orthostatic dizziness at times.  She states that she stands still for a couple seconds it will subside.  Her blood pressure today is stable.  She states this does not happen every day but will happen a couple times per week.    PRIOR MEDS  Current Outpatient Medications on File Prior to Visit   Medication Sig Dispense Refill    albuterol sulfate  (90 Base) MCG/ACT inhaler Inhale 2 puffs Every 4 (Four) Hours As Needed for Wheezing.      aspirin 81 MG EC tablet TAKE ONE TABLET BY MOUTH DAILY 30 tablet 11    atorvastatin (LIPITOR) 40 MG tablet Take 1 tablet by mouth every night at bedtime.      bumetanide (BUMEX) 2 MG tablet Take 1 tablet by mouth 2 (Two) Times a Day.      citalopram (CeleXA) 40 MG tablet Take 1 tablet by mouth Daily.  1    clopidogrel (PLAVIX) 75 MG tablet TAKE 1 TABLET BY MOUTH DAILY. 90 tablet 3    docusate sodium (COLACE) 250 MG capsule Take 1 capsule by mouth Daily.      Enulose 10 GM/15ML solution solution (encephalopathy) Take 45 mL by mouth 2 (Two) Times a Day.      Farxiga 10 MG tablet TAKE 1 TABLET BY MOUTH DAILY 30 tablet 11    fenofibrate (TRICOR) 145 MG tablet Take 1 tablet by mouth Daily.   5    ferrous sulfate 325 (65 FE) MG tablet Take 1 tablet by mouth Daily With Breakfast.      fexofenadine (ALLEGRA) 180 MG tablet Take 1 tablet by mouth Daily.      Fluticasone Furoate-Vilanterol (Breo Ellipta) 100-25 MCG/INH inhaler Inhale 1 puff Daily.      gabapentin (NEURONTIN) 800 MG tablet Take 1 tablet by mouth 3 (Three) Times a Day.      Insulin Glargine (BASAGLAR KWIKPEN SC) Inject 60 Units under the skin into the appropriate area as directed Every Night.      metoprolol succinate XL (TOPROL-XL) 25 MG 24 hr tablet Take 0.5 tablets by mouth Daily. 45 tablet 3    nitroglycerin (Nitrostat) 0.4 MG SL tablet Place 1 tablet under the tongue Every 5 (Five) Minutes As Needed for Chest Pain. 25 tablet 3    NovoLOG FlexPen 100 UNIT/ML solution pen-injector sc pen 15 Units 3 (Three) Times a Day With Meals.      pantoprazole (PROTONIX) 40 MG EC tablet Take 1 tablet by mouth Every Morning Before Breakfast.      potassium chloride (KLOR-CON M20) 20 MEQ CR tablet Take 1 tablet by mouth 2 (Two) Times a Day.      Trulicity 0.75 MG/0.5ML solution pen-injector       Umeclidinium Bromide (Incruse Ellipta) 62.5 MCG/ACT aerosol powder  Inhale.      cetirizine (zyrTEC) 10 MG tablet Take 1 tablet by mouth Daily.       No current facility-administered medications on file prior to visit.       ALLERGIES  Drug [acetaminophen-codeine], Hydrocodone, and Midodrine    HISTORY  Past Medical History:   Diagnosis Date    Asthma     CAD (coronary artery disease)     Catheterization in 2009 at [facility] demonstrated 30% circumflex disease with 50% posterolateral branch.     Chest pain     CHF (congestive heart failure)     Clotting disorder     Takes blood thinners    Congenital heart disease     Hearts at 30%    COPD (chronic obstructive pulmonary disease)     Depression     Diabetes mellitus     checks bs at home daily usually; cannot always follow a diabetic diet she states due to cost of food    Dyslipidemia     Fatigue     Hiatal  "hernia     Hyperlipidemia     Hypertension     Myocardial infarction     No natural teeth     Peripheral vascular disease     Shortness of breath     Tobacco use     smokes 2 packs a week    Wears glasses        Social History     Socioeconomic History    Marital status:    Tobacco Use    Smoking status: Former     Current packs/day: 0.00     Average packs/day: 0.5 packs/day for 40.0 years (20.0 ttl pk-yrs)     Types: Cigarettes     Start date: 8/4/1981     Quit date: 8/4/2021     Years since quitting: 3.0    Smokeless tobacco: Never    Tobacco comments:         Vaping Use    Vaping status: Never Used   Substance and Sexual Activity    Alcohol use: No    Drug use: No    Sexual activity: Not Currently     Partners: Male     Birth control/protection: Tubal ligation       Family History   Problem Relation Age of Onset    Hypertension Mother     Hyperlipidemia Mother     Skin cancer Mother     No Known Problems Father        Review of Systems   Constitutional:  Negative for fever.   HENT:  Positive for postnasal drip and rhinorrhea. Negative for congestion, sinus pressure and sinus pain.    Respiratory:  Negative for chest tightness and shortness of breath.    Cardiovascular:  Positive for chest pain (sometimes its sharp and others its dull). Negative for palpitations and leg swelling.   Gastrointestinal: Negative.    Genitourinary: Negative.    Allergic/Immunologic: Positive for environmental allergies.   Neurological:  Positive for light-headedness and headaches. Negative for dizziness and syncope.   Hematological:  Bruises/bleeds easily.   Psychiatric/Behavioral:  Positive for sleep disturbance (trouble falling and staying asleep).        Objective     VITALS: /81 (BP Location: Left arm, Patient Position: Sitting, Cuff Size: Adult)   Pulse 86   Ht 157 cm (61.81\")   Wt 86.1 kg (189 lb 12.8 oz)   SpO2 93%   BMI 34.93 kg/m²     LABS:   Lab Results (most recent)       None            IMAGING:   No " Images in the past 120 days found..    EXAM:  Physical Exam  Constitutional:       Appearance: Normal appearance.   Eyes:      Pupils: Pupils are equal, round, and reactive to light.   Cardiovascular:      Rate and Rhythm: Normal rate and regular rhythm.      Pulses:           Carotid pulses are 2+ on the right side and 2+ on the left side.       Radial pulses are 2+ on the right side and 2+ on the left side.        Dorsalis pedis pulses are 2+ on the right side and 2+ on the left side.        Posterior tibial pulses are 2+ on the right side and 2+ on the left side.      Heart sounds: Murmur heard.   Pulmonary:      Effort: Pulmonary effort is normal.      Breath sounds: Normal breath sounds.   Abdominal:      General: Bowel sounds are normal.      Palpations: Abdomen is soft.   Musculoskeletal:      Right lower leg: No edema.      Left lower leg: No edema.   Skin:     General: Skin is warm and dry.      Capillary Refill: Capillary refill takes less than 2 seconds.   Neurological:      General: No focal deficit present.      Mental Status: She is alert and oriented to person, place, and time.   Psychiatric:         Mood and Affect: Mood normal.         Thought Content: Thought content normal.         Procedure   Procedures       Assessment & Plan    Diagnosis Plan   1. HFrEF (heart failure with reduced ejection fraction)        2. Coronary artery disease due to calcified coronary lesion        3. Essential (primary) hypertension        4. Dizzy        5. AICD (automatic cardioverter/defibrillator) present          Plan:  1.  HFrEF: We will continue appropriate tolerated guideline directed medical therapy including Farxiga and Toprol.  The patient is on Bumex as well.  She is unable to tolerate ACE ARB or Arni due to renal function.  Most recent creatinine during ED visit at [facility] was around 2.  She was advised to notify our office for worsening dyspnea, increasing edema.  The patient verbalizes an  understanding.  2.  CAD: Continue medical management including Toprol, aspirin, Plavix, statin.  The patient does have some intermittent chest pain which she states is decreasing in frequency and intensity.  She does have sublingual nitroglycerin to use if needed.  We did review instructions on appropriate use and when to seek emergency treatment.  She was vies notify our office if symptoms become worse.  3.  Essential hypertension: Blood pressure is stable in the office today.  The patient is complaining of some orthostatic dizziness which will occur 1-2 times per week.  I have asked her to monitor her blood pressure and bring the log at her next visit.  Will adjust medications if needed.  4.  Dizziness:The patient is complaining of some orthostatic dizziness which will occur 1-2 times per week.  I have asked her to monitor her blood pressure and bring the log at her next visit.  Will adjust medications if needed.  5.  AICD: Remote interrogation at the end of July showed good parameters.  Will continue with interrogations as scheduled.    Return Keep appt as scheduled in November.    Maeve KEARNEY Page  reports that she quit smoking about 3 years ago. Her smoking use included cigarettes. She started smoking about 43 years ago. She has a 20 pack-year smoking history. She has never used smokeless tobacco.                        MEDS ORDERED DURING VISIT:  No orders of the defined types were placed in this encounter.      DISCONTINUED MEDS DURING VISIT:   There are no discontinued medications.       This document has been electronically signed by ANNE Sheth  August 26, 2024 16:19 EDT    Dictated Utilizing Dragon Dictation: Part of this note may be an electronic transcription/translation of spoken language to printed text using the Dragon Dictation System

## 2024-11-25 ENCOUNTER — OFFICE VISIT (OUTPATIENT)
Dept: CARDIOLOGY | Facility: CLINIC | Age: 64
End: 2024-11-25
Payer: MEDICARE

## 2024-11-25 VITALS
WEIGHT: 126.6 LBS | HEIGHT: 62 IN | OXYGEN SATURATION: 92 % | BODY MASS INDEX: 23.3 KG/M2 | DIASTOLIC BLOOD PRESSURE: 73 MMHG | SYSTOLIC BLOOD PRESSURE: 108 MMHG

## 2024-11-25 DIAGNOSIS — R06.02 SHORTNESS OF BREATH: ICD-10-CM

## 2024-11-25 DIAGNOSIS — I25.10 CORONARY ARTERY DISEASE DUE TO CALCIFIED CORONARY LESION: ICD-10-CM

## 2024-11-25 DIAGNOSIS — R07.2 PRECORDIAL PAIN: ICD-10-CM

## 2024-11-25 DIAGNOSIS — I10 ESSENTIAL (PRIMARY) HYPERTENSION: ICD-10-CM

## 2024-11-25 DIAGNOSIS — I50.20 HFREF (HEART FAILURE WITH REDUCED EJECTION FRACTION): Primary | ICD-10-CM

## 2024-11-25 DIAGNOSIS — I25.84 CORONARY ARTERY DISEASE DUE TO CALCIFIED CORONARY LESION: ICD-10-CM

## 2024-11-25 RX ORDER — SEMAGLUTIDE 0.68 MG/ML
0.25 INJECTION, SOLUTION SUBCUTANEOUS WEEKLY
COMMUNITY

## 2024-11-25 NOTE — PROGRESS NOTES
Subjective     Maeve Yates is a 64 y.o. female who presents to day for 6 month follow up, Hypertension, Cardiomyopathy (Ischemic ), and heart failure with reduced EF.    CHIEF COMPLIANT  Chief Complaint   Patient presents with    6 month follow up    Hypertension    Cardiomyopathy     Ischemic     heart failure with reduced EF       Active Problems:  Problem List Items Addressed This Visit          Cardiac and Vasculature    Chest pain    Relevant Orders    04/22/24    Stress Test With Myocardial Perfusion One Day    Essential (primary) hypertension    Relevant Orders    04/22/24    Stress Test With Myocardial Perfusion One Day    CAD (coronary artery disease)    Relevant Orders    04/22/24    Stress Test With Myocardial Perfusion One Day       Pulmonary and Pneumonias    Shortness of breath    Relevant Orders    04/22/24    Stress Test With Myocardial Perfusion One Day     Other Visit Diagnoses       HFrEF (heart failure with reduced ejection fraction)    -  Primary    Relevant Orders    04/22/24    Stress Test With Myocardial Perfusion One Day        Active Problems:  1. Coronary artery disease. Catheterization in 2009 at [facility] demonstrated 30%  circumflex disease with 50% posterolateral branch.   1.1 follow-up catheterization in September 2016 because of non-ST elevation myocardial infarction demonstrated a total occlusion of the right coronary artery with 90% high-grade LAD disease and 50-70% circumflex disease. Stenting of the LAD was performed. Medical management recommended and percutaneous intervention in the circumflex if patient fails medical therapy.  1.2 heart cath 3/17: stenting of the circumflex. Patent stent to the LAD with chronic total occlusion of the right coronary artery. Medical management recommended  1.3 left heart catheterization 6/20: Left main normal, LAD 30% in-stent stenosis with left-to-right collaterals, circumflex patent stent, left to right collaterals, % occluded,  EF 50 to 55%  1.4 Left heart Catheterization 11/24/21: LAD 95% in-stent stenosis with angioplasty that left minimal residual stenosis,  of the RCA that was unable to be crossed, 90% septal , EF less than 30%  1.5 left heart cath 7/23:  successful stenting of  in the proximal third of the LAD related to intra-stent restenosis.  EF 30-35% with global hypokinesis.     1.6 Stress test 4/24: No clear-cut ischemic defects with a moderately depressed post stress EF of 39% with mild global hypokinesis and near akinesis of the septal and lateral walls.  2. Ischemic cardiomyopathy s/p AICD   2.1 echocardiogram 1/22: EF 25 to 30% dilated left and right atrium, no significant valvular disease  2.2 echocardiogram 4/24: EF 35±5%, grade 1A diastolic dysfunction, trivial MR  3. Hypertension.   4. Chronic tobacco habituation.   5. Dyslipidemia.   6.  Angioplasty to left lower extremity per Dr. White 8/2/17    HPI  HPI  Ms. Maeve Johnson is a 64-year-old female patient who is being seen today for history of coronary artery disease, chronic arterial hypertension, and heart failure with reduced ejection fraction.    Patient does have a history of chronic arterial hypertension in which she is being treated with metoprolol.  Today her blood pressure is.108/73 and heart rate of 86.      Patient does have a history of coronary artery disease in which she has stenting to the LAD and a  of the RCA that was unable to be crossed.  She is on a atorvastatin for high intensity statin therapy Plavix and aspirin for antiplatelet therapy.    Patient does have heart failure with reduced ejection fraction.  Most recently patient's ejection fraction was 35+ to -5% per echocardiogram in April 2024.  She is on Farxiga and metoprolol succinate.  ACE/ARB/Arni has been deferred due to periods of hypotension.    Patient does report continuation of chest pain that occurs in her left upper chest.  She said she had severe episode last  night and this morning.  She says it started to scare her.  She had episodes between 2 and 3 AM and reoccurred at 8 to 9 AM.  She did become more short of breath.  She does have a lot of pain under her left arm as well.  It occurs both with rest and exertion.  She says she will lay still and just hopes that it goes away.  Usually only last a few minutes.  She describes it as sharp block.    Patient does have extensive shortness of breath occurs with exertion.  She is walking from the office to her car or from the bedroom to the kitchen she becomes dyspneic.  She even has dyspnea at rest.  She has been told that she breathes really hard when she is just sitting there.  She also has some level of orthopnea.    Patient also has fatigue where she is tired very easily.  Minimal activity just wears her out per her report.    She also has dizziness if she changes positions or bends over.  PRIOR MEDS  Current Outpatient Medications on File Prior to Visit   Medication Sig Dispense Refill    albuterol sulfate  (90 Base) MCG/ACT inhaler Inhale 2 puffs Every 4 (Four) Hours As Needed for Wheezing.      aspirin 81 MG EC tablet TAKE ONE TABLET BY MOUTH DAILY 30 tablet 11    atorvastatin (LIPITOR) 40 MG tablet Take 1 tablet by mouth every night at bedtime.      bumetanide (BUMEX) 2 MG tablet Take 1 tablet by mouth 2 (Two) Times a Day.      citalopram (CeleXA) 40 MG tablet Take 1 tablet by mouth Daily.  1    clopidogrel (PLAVIX) 75 MG tablet TAKE 1 TABLET BY MOUTH DAILY. 90 tablet 3    docusate sodium (COLACE) 250 MG capsule Take 1 capsule by mouth Daily.      Enulose 10 GM/15ML solution solution (encephalopathy) Take 45 mL by mouth 2 (Two) Times a Day.      Farxiga 10 MG tablet TAKE 1 TABLET BY MOUTH DAILY 30 tablet 11    fenofibrate (TRICOR) 145 MG tablet Take 1 tablet by mouth Daily.  5    ferrous sulfate 325 (65 FE) MG tablet Take 1 tablet by mouth Daily With Breakfast.      fexofenadine (ALLEGRA) 180 MG tablet Take 1  tablet by mouth Daily.      Fluticasone Furoate-Vilanterol (Breo Ellipta) 100-25 MCG/INH inhaler Inhale 1 puff Daily.      gabapentin (NEURONTIN) 800 MG tablet Take 1 tablet by mouth 3 (Three) Times a Day.      Insulin Glargine (BASAGLAR KWIKPEN SC) Inject 60 Units under the skin into the appropriate area as directed Every Night.      metoprolol succinate XL (TOPROL-XL) 25 MG 24 hr tablet Take 0.5 tablets by mouth Daily. 45 tablet 3    nitroglycerin (Nitrostat) 0.4 MG SL tablet Place 1 tablet under the tongue Every 5 (Five) Minutes As Needed for Chest Pain. 25 tablet 3    NovoLOG FlexPen 100 UNIT/ML solution pen-injector sc pen 15 Units 3 (Three) Times a Day With Meals.      pantoprazole (PROTONIX) 40 MG EC tablet Take 1 tablet by mouth Every Morning Before Breakfast.      potassium chloride (KLOR-CON M20) 20 MEQ CR tablet Take 1 tablet by mouth 2 (Two) Times a Day.      Semaglutide,0.25 or 0.5MG/DOS, (Ozempic, 0.25 or 0.5 MG/DOSE,) 2 MG/3ML solution pen-injector Inject 0.25 mg under the skin into the appropriate area as directed 1 (One) Time Per Week.      Umeclidinium Bromide (Incruse Ellipta) 62.5 MCG/ACT aerosol powder  Inhale.      [DISCONTINUED] cetirizine (zyrTEC) 10 MG tablet Take 1 tablet by mouth Daily.      [DISCONTINUED] Trulicity 0.75 MG/0.5ML solution pen-injector        No current facility-administered medications on file prior to visit.       ALLERGIES  Drug [acetaminophen-codeine], Hydrocodone, and Midodrine    HISTORY  Past Medical History:   Diagnosis Date    Asthma     CAD (coronary artery disease)     [facility]% circumflex disease with 50% posterolateral branch.     Chest pain     CHF (congestive heart failure)     Clotting disorder     Takes blood thinners    Congenital heart disease     Hearts at 30%    COPD (chronic obstructive pulmonary disease)     Depression     Diabetes mellitus     checks bs at home daily usually; cannot always follow a  diabetic diet she states due to cost of food    Dyslipidemia     Fatigue     Hiatal hernia     Hyperlipidemia     Hypertension     Myocardial infarction     No natural teeth     Peripheral vascular disease     Shortness of breath     Tobacco use     smokes 2 packs a week    Wears glasses        Social History     Socioeconomic History    Marital status:    Tobacco Use    Smoking status: Former     Current packs/day: 0.00     Average packs/day: 0.5 packs/day for 40.0 years (20.0 ttl pk-yrs)     Types: Cigarettes     Start date: 8/4/1981     Quit date: 8/4/2021     Years since quitting: 3.3    Smokeless tobacco: Never    Tobacco comments:         Vaping Use    Vaping status: Never Used   Substance and Sexual Activity    Alcohol use: No    Drug use: No    Sexual activity: Not Currently     Partners: Male     Birth control/protection: Tubal ligation       Family History   Problem Relation Age of Onset    Hypertension Mother     Hyperlipidemia Mother     Skin cancer Mother     No Known Problems Father        Review of Systems   Constitutional:  Positive for fatigue. Negative for chills and fever.   HENT:  Positive for rhinorrhea. Negative for congestion and sore throat.    Eyes:  Negative for visual disturbance.   Respiratory:  Positive for shortness of breath (with exertion). Negative for apnea and chest tightness.    Cardiovascular:  Positive for chest pain (sharp at times has pain in arms sometimes pain down in L side). Negative for palpitations and leg swelling.   Gastrointestinal:  Positive for constipation and diarrhea. Negative for nausea.   Musculoskeletal:  Positive for arthralgias, back pain and neck pain.   Skin:  Negative for rash and wound.   Allergic/Immunologic: Positive for environmental allergies. Negative for food allergies.   Neurological:  Positive for dizziness (getting up and down and bending over) and light-headedness. Negative for syncope and weakness.   Hematological:  Bruises/bleeds  "easily.   Psychiatric/Behavioral:  Positive for sleep disturbance (NO SOB does not sleep well).        Objective     VITALS: /73 (BP Location: Right arm, Patient Position: Sitting, Cuff Size: Adult)   Ht 157 cm (61.81\")   Wt 57.4 kg (126 lb 9.6 oz)   SpO2 92%   BMI 23.30 kg/m²     LABS:   Lab Results (most recent)       None            IMAGING:   No Images in the past 120 days found..    EXAM:  Physical Exam  Vitals and nursing note reviewed.   Constitutional:       Appearance: She is well-developed.   HENT:      Head: Normocephalic.   Neck:      Thyroid: No thyroid mass.      Vascular: No carotid bruit or JVD.      Trachea: Trachea and phonation normal.   Cardiovascular:      Rate and Rhythm: Normal rate and regular rhythm.      Pulses:           Radial pulses are 2+ on the right side and 2+ on the left side.        Posterior tibial pulses are 2+ on the right side and 2+ on the left side.      Heart sounds: Murmur heard.      No friction rub. No gallop.   Pulmonary:      Effort: Pulmonary effort is normal. No respiratory distress.      Breath sounds: Normal breath sounds. No wheezing or rales.   Musculoskeletal:         General: No swelling. Normal range of motion.      Cervical back: Neck supple.   Skin:     General: Skin is warm and dry.      Capillary Refill: Capillary refill takes less than 2 seconds.      Findings: No rash.   Neurological:      Mental Status: She is alert and oriented to person, place, and time.   Psychiatric:         Speech: Speech normal.         Behavior: Behavior normal.         Thought Content: Thought content normal.         Judgment: Judgment normal.         Procedure     04/22/24    Date/Time: 11/25/2024 2:26 PM  Performed by: Marshall Oquendo APRN    Authorized by: Marshall Oquendo APRN  Comparison: compared with previous ECG from 4/22/2024  Similar to previous ECG  Rhythm: sinus rhythm  Rate: normal  BPM: 86  QRS axis: normal  Other findings: non-specific ST-T wave " changes  Comments: QTc 421 ms  No acute changes             Assessment & Plan    Diagnosis Plan   1. HFrEF (heart failure with reduced ejection fraction)  04/22/24    Stress Test With Myocardial Perfusion One Day      2. Coronary artery disease due to calcified coronary lesion  04/22/24    Stress Test With Myocardial Perfusion One Day      3. Essential (primary) hypertension  04/22/24    Stress Test With Myocardial Perfusion One Day      4. Precordial pain  04/22/24    Stress Test With Myocardial Perfusion One Day      5. Shortness of breath  04/22/24    Stress Test With Myocardial Perfusion One Day      1.  Due to patient's significant history of heart failure with reduced ejection fraction, coronary artery disease, continued chest pain and shortness of breath that is occurring frequently I do think it is warranted to repeat ischemic workup.  This will be including a stress test.  She is not a candidate for the treadmill due to extensive shortness of breath and decreased exercise tolerance.    2.  Patient's blood pressure is controlled on current blood pressure medication regimen.  No medication changes are warranted at this time.  Patient advised to monitor blood pressure on a daily basis and report any persistent highs or lows.  Set goal blood pressure for patient at 130/80 or below.    3.  Patient does have heart failure with reduced ejection fraction which was on max tolerated guideline driven medication therapy.  Will continue these without change.    4.  Informed of signs and symptoms of ACS and advised to seek emergent treatment for any new worsening symptoms.  Patient also advised sooner follow-up as needed.  Also advised to follow-up with family doctor as needed  This note is dictated utilizing voice recognition software.  Although this record has been proof read, transcriptional errors may still be present. If questions occur regarding the content of this record please do not hesitate to call our  office.  I have reviewed and confirmed the accuracy of the ROS as documented by the MA/LPN/RN ANNE Parry    Return if symptoms worsen or fail to improve, for Next scheduled follow up.    Diagnoses and all orders for this visit:    1. HFrEF (heart failure with reduced ejection fraction) (Primary)  -     04/22/24  -     Stress Test With Myocardial Perfusion One Day; Future    2. Coronary artery disease due to calcified coronary lesion  -     04/22/24  -     Stress Test With Myocardial Perfusion One Day; Future    3. Essential (primary) hypertension  -     04/22/24  -     Stress Test With Myocardial Perfusion One Day; Future    4. Precordial pain  -     04/22/24  -     Stress Test With Myocardial Perfusion One Day; Future    5. Shortness of breath  -     04/22/24  -     Stress Test With Myocardial Perfusion One Day; Future        Maeve A Page  reports that she quit smoking about 3 years ago. Her smoking use included cigarettes. She started smoking about 43 years ago. She has a 20 pack-year smoking history. She has never used smokeless tobacco. I have educated her on the risk of diseases from using tobacco products. Patient does not smoke.                     MEDS ORDERED DURING VISIT:  No orders of the defined types were placed in this encounter.          This document has been electronically signed by Marshall Oquendo Jr., ANNE  November 25, 2024 14:58 EST

## 2024-12-06 DIAGNOSIS — I50.20 HFREF (HEART FAILURE WITH REDUCED EJECTION FRACTION): ICD-10-CM

## 2024-12-09 RX ORDER — DAPAGLIFLOZIN 10 MG/1
1 TABLET, FILM COATED ORAL DAILY
Qty: 30 TABLET | Refills: 11 | Status: SHIPPED | OUTPATIENT
Start: 2024-12-09

## 2025-01-28 ENCOUNTER — TELEPHONE (OUTPATIENT)
Dept: CARDIOLOGY | Facility: CLINIC | Age: 65
End: 2025-01-28
Payer: MEDICARE

## 2025-01-28 NOTE — TELEPHONE ENCOUNTER
Missed remote transmission. Called patient, spoke to daughter Amy. She is going to check on the monitor for her mother.

## 2025-02-04 ENCOUNTER — HOSPITAL ENCOUNTER (OUTPATIENT)
Dept: CARDIOLOGY | Facility: HOSPITAL | Age: 65
Discharge: HOME | End: 2025-02-04
Payer: MEDICARE

## 2025-02-04 DIAGNOSIS — I50.20 HFREF (HEART FAILURE WITH REDUCED EJECTION FRACTION): ICD-10-CM

## 2025-02-04 DIAGNOSIS — I25.10 CORONARY ARTERY DISEASE DUE TO CALCIFIED CORONARY LESION: ICD-10-CM

## 2025-02-04 DIAGNOSIS — R07.2 PRECORDIAL PAIN: ICD-10-CM

## 2025-02-04 DIAGNOSIS — I10 ESSENTIAL (PRIMARY) HYPERTENSION: ICD-10-CM

## 2025-02-04 DIAGNOSIS — I25.84 CORONARY ARTERY DISEASE DUE TO CALCIFIED CORONARY LESION: ICD-10-CM

## 2025-02-04 DIAGNOSIS — R06.02 SHORTNESS OF BREATH: ICD-10-CM

## 2025-02-04 PROCEDURE — 25010000002 REGADENOSON 0.4 MG/5ML SOLUTION: Performed by: INTERNAL MEDICINE

## 2025-02-04 PROCEDURE — 93017 CV STRESS TEST TRACING ONLY: CPT

## 2025-02-04 PROCEDURE — 34310000005 TECHNETIUM SESTAMIBI: Performed by: INTERNAL MEDICINE

## 2025-02-04 PROCEDURE — A9500 TC99M SESTAMIBI: HCPCS | Performed by: INTERNAL MEDICINE

## 2025-02-04 PROCEDURE — 78452 HT MUSCLE IMAGE SPECT MULT: CPT

## 2025-02-04 RX ORDER — REGADENOSON 0.08 MG/ML
0.4 INJECTION, SOLUTION INTRAVENOUS
Status: COMPLETED | OUTPATIENT
Start: 2025-02-04 | End: 2025-02-04

## 2025-02-04 RX ADMIN — REGADENOSON 0.4 MG: 0.08 INJECTION, SOLUTION INTRAVENOUS at 13:37

## 2025-02-04 RX ADMIN — TECHNETIUM TC 99M SESTAMIBI 1 DOSE: 1 INJECTION INTRAVENOUS at 13:37

## 2025-02-04 RX ADMIN — TECHNETIUM TC 99M SESTAMIBI 1 DOSE: 1 INJECTION INTRAVENOUS at 12:19

## 2025-02-05 ENCOUNTER — TELEPHONE (OUTPATIENT)
Dept: CARDIOLOGY | Facility: CLINIC | Age: 65
End: 2025-02-05
Payer: MEDICARE

## 2025-02-05 NOTE — TELEPHONE ENCOUNTER
Amy Yates who is on the pt's HIPAA called to obtain results from the pt's testing that was performed on 2/4/25.  Explained that Dr. Lloyd has not read the test yet, but once he does JR will review it and make recommendations.  Someone will call with the results and recommendations.  She verbalized an understanding.

## 2025-02-07 LAB
BH CV REST NUCLEAR ISOTOPE DOSE: 10 MCI
BH CV STRESS COMMENTS STAGE 1: NORMAL
BH CV STRESS DOSE REGADENOSON STAGE 1: 0.4
BH CV STRESS DURATION MIN STAGE 1: 0
BH CV STRESS DURATION SEC STAGE 1: 10
BH CV STRESS NUCLEAR ISOTOPE DOSE: 30 MCI
BH CV STRESS PROTOCOL 1: NORMAL
BH CV STRESS RECOVERY BP: NORMAL MMHG
BH CV STRESS RECOVERY HR: 93 BPM
BH CV STRESS STAGE 1: 1
MAXIMAL PREDICTED HEART RATE: 157 BPM
PERCENT MAX PREDICTED HR: 59.87 %
STRESS BASELINE BP: NORMAL MMHG
STRESS BASELINE HR: 88 BPM
STRESS PERCENT HR: 70 %
STRESS POST PEAK BP: NORMAL MMHG
STRESS POST PEAK HR: 94 BPM
STRESS TARGET HR: 133 BPM

## 2025-02-11 ENCOUNTER — TELEPHONE (OUTPATIENT)
Dept: CARDIOLOGY | Facility: CLINIC | Age: 65
End: 2025-02-11
Payer: MEDICARE

## 2025-02-11 NOTE — TELEPHONE ENCOUNTER
Caller: Maeve Yates    Relationship: Self    Best call back number: [phone]     Caller requesting test results: SELF     What test was performed: STRESS     When was the test performed: 2/4/25    Where was the test performed: [facility]

## 2025-02-11 NOTE — TELEPHONE ENCOUNTER
Amy (HIPAA) notified we will call with results and recommendations once the provider has gotten them to us.

## 2025-02-13 ENCOUNTER — TELEPHONE (OUTPATIENT)
Dept: CARDIOLOGY | Facility: CLINIC | Age: 65
End: 2025-02-13
Payer: MEDICARE

## 2025-02-13 NOTE — TELEPHONE ENCOUNTER
Patient's daughter, Amy, notified of results and recommendations. Advised she will receive a call with date and time of new appointment.     ----- Message from Ellen NEVILLE sent at 2/13/2025  3:09 PM EST -----    ----- Message -----  From: Marshall Oquendo APRN  Sent: 2/11/2025   5:57 PM EST  To: Ellen Leong MA    Positive stress test.  1 to 2-week follow-up.    Result Text  1.  Scintigraphic images demonstrated a small to moderately sized, dense, fixed defect in the lateral wall compatible with infarct.  Additionally, there was a small, mild completely reversible a focal defect felt compatible with ischemia.     2.  Moderately depressed post stress ejection fraction of 37% with lateral and apical hypokinesis.     3.  No findings to implicate multivessel coronary disease or increased LV filling pressures at this time of this study.

## 2025-02-17 ENCOUNTER — OFFICE VISIT (OUTPATIENT)
Dept: CARDIOLOGY | Facility: CLINIC | Age: 65
End: 2025-02-17
Payer: MEDICARE

## 2025-02-17 VITALS
HEART RATE: 80 BPM | OXYGEN SATURATION: 88 % | HEIGHT: 62 IN | DIASTOLIC BLOOD PRESSURE: 72 MMHG | BODY MASS INDEX: 34.41 KG/M2 | SYSTOLIC BLOOD PRESSURE: 114 MMHG | WEIGHT: 187 LBS

## 2025-02-17 DIAGNOSIS — R94.39 ABNORMAL STRESS TEST: ICD-10-CM

## 2025-02-17 DIAGNOSIS — I25.84 CORONARY ARTERY DISEASE DUE TO CALCIFIED CORONARY LESION: ICD-10-CM

## 2025-02-17 DIAGNOSIS — I25.10 CORONARY ARTERY DISEASE DUE TO CALCIFIED CORONARY LESION: ICD-10-CM

## 2025-02-17 DIAGNOSIS — I50.20 HFREF (HEART FAILURE WITH REDUCED EJECTION FRACTION): Primary | ICD-10-CM

## 2025-02-17 DIAGNOSIS — R55 NEAR SYNCOPE: ICD-10-CM

## 2025-02-17 DIAGNOSIS — I10 ESSENTIAL (PRIMARY) HYPERTENSION: ICD-10-CM

## 2025-02-17 DIAGNOSIS — R07.2 PRECORDIAL PAIN: ICD-10-CM

## 2025-02-17 DIAGNOSIS — Z95.810 AICD (AUTOMATIC CARDIOVERTER/DEFIBRILLATOR) PRESENT: ICD-10-CM

## 2025-02-17 DIAGNOSIS — R06.02 SHORTNESS OF BREATH: ICD-10-CM

## 2025-02-17 PROCEDURE — 1159F MED LIST DOCD IN RCRD: CPT | Performed by: NURSE PRACTITIONER

## 2025-02-17 PROCEDURE — 3078F DIAST BP <80 MM HG: CPT | Performed by: NURSE PRACTITIONER

## 2025-02-17 PROCEDURE — 1160F RVW MEDS BY RX/DR IN RCRD: CPT | Performed by: NURSE PRACTITIONER

## 2025-02-17 PROCEDURE — 3074F SYST BP LT 130 MM HG: CPT | Performed by: NURSE PRACTITIONER

## 2025-02-17 PROCEDURE — 99214 OFFICE O/P EST MOD 30 MIN: CPT | Performed by: NURSE PRACTITIONER

## 2025-02-17 NOTE — PROGRESS NOTES
Dexter Yates is a 64 y.o. female who presents to day for abnormal stress.    CHIEF COMPLIANT  Chief Complaint   Patient presents with    abnormal stress       Active Problems:  Problem List Items Addressed This Visit          Cardiac and Vasculature    Chest pain    Relevant Orders    [facility]    CBC & Differential    Comprehensive Metabolic Panel    TSH    Lipid Panel    Magnesium    Essential (primary) hypertension    Relevant Orders    [facility]    CBC & Differential    Comprehensive Metabolic Panel    TSH    Lipid Panel    Magnesium    CAD (coronary artery disease)    Relevant Orders    [facility]    CBC & Differential    Comprehensive Metabolic Panel    TSH    Lipid Panel    Magnesium       Pulmonary and Pneumonias    Shortness of breath    Relevant Orders    [facility]    CBC & Differential    Comprehensive Metabolic Panel    TSH    Lipid Panel    Magnesium     Other Visit Diagnoses       HFrEF (heart failure with reduced ejection fraction)    -  Primary    Relevant Orders    [facility]    CBC & Differential    Comprehensive Metabolic Panel    TSH    Lipid Panel    Magnesium    AICD (automatic cardioverter/defibrillator) present        Relevant Orders    [facility]    CBC & Differential    Comprehensive Metabolic Panel    TSH    Lipid Panel    Magnesium    Near syncope        Relevant Orders    [facility]    CBC & Differential    Comprehensive Metabolic Panel    TSH    Lipid Panel    Magnesium    Abnormal stress test        Relevant Orders    [facility]    CBC & Differential    Comprehensive Metabolic Panel    TSH    Lipid Panel    Magnesium        Active Problems:  1. Coronary artery disease. Catheterization in 2009 at [facility] demonstrated 30%  circumflex disease with 50% posterolateral branch.   1.1 follow-up catheterization in September 2016 because of non-ST elevation myocardial infarction  demonstrated a total occlusion of the right coronary artery with 90% high-grade LAD disease and 50-70% circumflex disease. Stenting of the LAD was performed. Medical management recommended and percutaneous intervention in the circumflex if patient fails medical therapy.  1.2 heart cath 3/17: stenting of the circumflex. Patent stent to the LAD with chronic total occlusion of the right coronary artery. Medical management recommended  1.3 left heart catheterization 6/20: Left main normal, LAD 30% in-stent stenosis with left-to-right collaterals, circumflex patent stent, left to right collaterals, % occluded, EF 50 to 55%  1.4 Left heart Catheterization 11/24/21: LAD 95% in-stent stenosis with angioplasty that left minimal residual stenosis,  of the RCA that was unable to be crossed, 90% septal , EF less than 30%  1.5 left heart cath 7/23:  successful stenting of  in the proximal third of the LAD related to intra-stent restenosis.  EF 30-35% with global hypokinesis.     1.6 Stress test 2/25:  small to moderately sized, dense, fixed defect in the lateral wall compatible with infarct.  Additionally, there was a small, mild completely reversible a focal defect felt compatible with ischemia. Moderately depressed post stress ejection fraction of 37% with lateral and apical hypokinesis  2. Ischemic cardiomyopathy s/p AICD   2.1 echocardiogram 1/22: EF 25 to 30% dilated left and right atrium, no significant valvular disease  2.2 echocardiogram 4/24: EF 35±5%, grade 1A diastolic dysfunction, trivial MR  3. Hypertension.   4. Chronic tobacco habituation.   5. Dyslipidemia.   6.  Angioplasty to left lower extremity per Dr. White 8/2/17    HPI  HPI  Ms. Maeve Johnson is a 64-year-old female patient who is being seen today for history of coronary artery disease, chronic arterial hypertension, and heart failure with reduced ejection fraction.     Patient does have a history of chronic arterial hypertension  in which she is being treated with metoprolol.  Today her blood pressure is 114/72 and heart rate of 80.    Patient does have a history of coronary artery disease in which she has stenting to the LAD and a  of the RCA that was unable to be crossed.  She is on a atorvastatin for high intensity statin therapy Plavix and aspirin for antiplatelet therapy.     Patient does have heart failure with reduced ejection fraction.  Most recently patient's ejection fraction was 35+ to -5% per echocardiogram in April 2024.  She is on Farxiga and metoprolol succinate.  ACE/ARB/Arni has been deferred due to periods of hypotension.    Patient did have a stress test.  The stress test identified a scintigraphic images demonstrated a small to moderately sized, dense, fixed defect in the lateral wall compatible with infarct.  Additionally, there was a small, mild completely reversible a focal defect felt compatible with ischemia. Moderately depressed post stress ejection fraction of 37% with lateral and apical hypokinesis.  We did discuss this in detail.    Patient does report chest pain that occurs on a daily basis that can last a few seconds up to several minutes.  She says that it varies in characteristic from dull sharp and tightness.  She says that she does have associated shortness of breath and diaphoresis.  It can occur both with rest and exertion.    Patient does report shortness of breath that has increased lately.  She says her shortness of breath occurs with only minimal activity such as even walking in the house.  She says she will become dyspneic even walking 10 steps.  She is having to use a cart when she goes to the store.  She also has dyspnea at rest as well as some level of orthopnea.  She says at times when she lays down she has to sit back up to stop the smothering.    Patient has palpitations she has intermittent racing like sensation that occurs in her chest usually last a few seconds to a few minutes.  She says  this occurs roughly 1 time a week.  She does have associated smothering with it.    Patient does report fatigue where she is tired all the time.  She has a decreased functional capacity even more so than before.  As mentioned above she is having use electronic cart even when she goes to the grocery store.    Patient does have dizziness if she bends over or changes positions too quickly.  She has had episodes of near syncope where she gets up to go to the kitchen and she will have to sit back down to keep from passing out.  PRIOR MEDS  Current Outpatient Medications on File Prior to Visit   Medication Sig Dispense Refill    albuterol sulfate  (90 Base) MCG/ACT inhaler Inhale 2 puffs Every 4 (Four) Hours As Needed for Wheezing.      aspirin 81 MG EC tablet TAKE ONE TABLET BY MOUTH DAILY 30 tablet 11    atorvastatin (LIPITOR) 40 MG tablet Take 1 tablet by mouth every night at bedtime.      bumetanide (BUMEX) 1 MG tablet Take 1 tablet by mouth 2 (Two) Times a Day.      citalopram (CeleXA) 40 MG tablet Take 1 tablet by mouth Daily.  1    clopidogrel (PLAVIX) 75 MG tablet TAKE 1 TABLET BY MOUTH DAILY. 90 tablet 3    docusate sodium (COLACE) 250 MG capsule Take 1 capsule by mouth Daily.      Enulose 10 GM/15ML solution solution (encephalopathy) Take 45 mL by mouth 2 (Two) Times a Day.      Farxiga 10 MG tablet TAKE 1 TABLET BY MOUTH DAILY 30 tablet 11    fenofibrate (TRICOR) 145 MG tablet Take 1 tablet by mouth Daily.  5    ferrous sulfate 325 (65 FE) MG tablet Take 1 tablet by mouth Daily With Breakfast.      fexofenadine (ALLEGRA) 180 MG tablet Take 1 tablet by mouth Daily.      Fluticasone Furoate-Vilanterol (Breo Ellipta) 100-25 MCG/INH inhaler Inhale 1 puff Daily.      gabapentin (NEURONTIN) 800 MG tablet Take 1 tablet by mouth 3 (Three) Times a Day.      Insulin Glargine (BASAGLAR KWIKPEN SC) Inject 60 Units under the skin into the appropriate area as directed Every Night.      metoprolol succinate XL  (TOPROL-XL) 25 MG 24 hr tablet Take 0.5 tablets by mouth Daily. 45 tablet 3    nitroglycerin (Nitrostat) 0.4 MG SL tablet Place 1 tablet under the tongue Every 5 (Five) Minutes As Needed for Chest Pain. 25 tablet 3    NovoLOG FlexPen 100 UNIT/ML solution pen-injector sc pen 15 Units 3 (Three) Times a Day With Meals.      pantoprazole (PROTONIX) 40 MG EC tablet Take 1 tablet by mouth Every Morning Before Breakfast.      potassium chloride (KLOR-CON M20) 20 MEQ CR tablet Take 1 tablet by mouth 2 (Two) Times a Day.      Semaglutide,0.25 or 0.5MG/DOS, (Ozempic, 0.25 or 0.5 MG/DOSE,) 2 MG/3ML solution pen-injector Inject 0.25 mg under the skin into the appropriate area as directed 1 (One) Time Per Week.      Umeclidinium Bromide (Incruse Ellipta) 62.5 MCG/ACT aerosol powder  Inhale.       No current facility-administered medications on file prior to visit.       ALLERGIES  Drug [acetaminophen-codeine], Hydrocodone, and Midodrine    HISTORY  Past Medical History:   Diagnosis Date    Asthma     CAD (coronary artery disease)     Catheterization in 2009 at [facility] demonstrated 30% circumflex disease with 50% posterolateral branch.     Chest pain     CHF (congestive heart failure)     Clotting disorder     Takes blood thinners    Congenital heart disease     Hearts at 30%    COPD (chronic obstructive pulmonary disease)     Depression     Diabetes mellitus     checks bs at home daily usually; cannot always follow a diabetic diet she states due to cost of food    Dyslipidemia     Fatigue     Hiatal hernia     Hyperlipidemia     Hypertension     Myocardial infarction     No natural teeth     Peripheral vascular disease     Shortness of breath     Tobacco use     smokes 2 packs a week    Wears glasses        Social History     Socioeconomic History    Marital status:    Tobacco Use    Smoking status: Former     Current packs/day: 0.00     Average packs/day: 0.5 packs/day for 40.0 years (20.0 ttl pk-yrs)     Types:  "Cigarettes     Start date: 8/4/1981     Quit date: 8/4/2021     Years since quitting: 3.5    Smokeless tobacco: Never    Tobacco comments:         Vaping Use    Vaping status: Never Used   Substance and Sexual Activity    Alcohol use: No    Drug use: No    Sexual activity: Not Currently     Partners: Male     Birth control/protection: Tubal ligation       Family History   Problem Relation Age of Onset    Hypertension Mother     Hyperlipidemia Mother     Skin cancer Mother     No Known Problems Father        Review of Systems   Constitutional:  Positive for fatigue. Negative for chills and fever.   HENT:  Positive for rhinorrhea. Negative for congestion and sore throat.    Eyes:  Negative for visual disturbance.   Respiratory:  Positive for chest tightness (chest feels heavy) and shortness of breath (with both rest and activity). Negative for apnea.    Cardiovascular:  Positive for chest pain (dull to sharp pain goes down Right arm) and palpitations (racing sometimes she antwan then). Negative for leg swelling.   Gastrointestinal:  Positive for constipation. Negative for diarrhea and nausea.   Musculoskeletal:  Positive for arthralgias (hands). Negative for back pain and neck pain.   Skin:  Negative for rash and wound.   Allergic/Immunologic: Positive for environmental allergies. Negative for food allergies.   Neurological:  Positive for dizziness (bending over getting up and down), weakness and light-headedness. Negative for syncope.   Hematological:  Bruises/bleeds easily.   Psychiatric/Behavioral:  Positive for sleep disturbance (No SOB at night does not sleep well).        Objective     VITALS: /72 (BP Location: Right arm, Patient Position: Sitting, Cuff Size: Adult)   Pulse 80   Ht 157 cm (61.81\")   Wt 84.8 kg (187 lb)   SpO2 (!) 88% Comment: checked x 2  BMI 34.41 kg/m²     LABS:   Lab Results (most recent)       None            IMAGING:   No Images in the past 120 days found..    EXAM:  Physical " Exam  Vitals and nursing note reviewed.   Constitutional:       Appearance: She is well-developed.   HENT:      Head: Normocephalic.   Neck:      Thyroid: No thyroid mass.      Vascular: No carotid bruit or JVD.      Trachea: Trachea and phonation normal.   Cardiovascular:      Rate and Rhythm: Normal rate and regular rhythm.      Pulses:           Radial pulses are 2+ on the right side and 2+ on the left side.        Posterior tibial pulses are 2+ on the right side and 2+ on the left side.      Heart sounds: Murmur heard.      No friction rub. No gallop.   Pulmonary:      Effort: Pulmonary effort is normal. No respiratory distress.      Breath sounds: Normal breath sounds. No wheezing or rales.   Musculoskeletal:         General: No swelling. Normal range of motion.      Cervical back: Neck supple.   Skin:     General: Skin is warm and dry.      Capillary Refill: Capillary refill takes less than 2 seconds.      Findings: No rash.   Neurological:      Mental Status: She is alert and oriented to person, place, and time.   Psychiatric:         Speech: Speech normal.         Behavior: Behavior normal.         Thought Content: Thought content normal.         Judgment: Judgment normal.         Procedure   Procedures       Assessment & Plan    Diagnosis Plan   1. HFrEF (heart failure with reduced ejection fraction)  [facility]    CBC & Differential    Comprehensive Metabolic Panel    TSH    Lipid Panel    Magnesium      2. Coronary artery disease due to calcified coronary lesion  [facility]    CBC & Differential    Comprehensive Metabolic Panel    TSH    Lipid Panel    Magnesium      3. Precordial pain  [facility]    CBC & Differential    Comprehensive Metabolic Panel    TSH    Lipid Panel    Magnesium      4. Shortness of breath  [facility]    CBC & Differential    Comprehensive Metabolic Panel    TSH    Lipid Panel    Magnesium      5. Essential (primary) hypertension  [facility]    CBC & Differential    Comprehensive Metabolic Panel    TSH    Lipid Panel    Magnesium      6. AICD (automatic cardioverter/defibrillator) present  [facility]    CBC & Differential    Comprehensive Metabolic Panel    TSH    Lipid Panel    Magnesium      7. Near syncope  [facility]    CBC & Differential    Comprehensive Metabolic Panel    TSH    Lipid Panel    Magnesium      8. Abnormal stress test  [facility]    CBC & Differential    Comprehensive Metabolic Panel    TSH    Lipid Panel    Magnesium      1.  Patient does have a history of heart failure with reduced ejection fraction which she is on max tolerated guideline driven medication therapy.  Patient does become hypotensive which is limited her guideline driven medication therapy for heart failure.  2.  Patient does have a positive stress test as well as a significant history of coronary artery disease with  of the RCA.  She has also had previous stenting of the  to the LAD.  The RCA lesion at that time was unable to be crossed.  However she continues to have chest pain, shortness of breath, increasing fatigue and dizziness with near syncope therefore I like for her to be further evaluated for ischemia including left heart catheterization.  Patient has opted to move forth with a left heart catheterization.  Benefits and risk of the heart catheterization was explained and patient wishes to continue.    We will also have patient have labs drawn proximately 1 week prior to left heart catheterization for further risk stratification.  3.  Patient's blood pressure is controlled on current blood pressure medication regimen.  No medication changes are warranted at this time.  Patient advised to monitor blood pressure on a daily basis and report any persistent highs or lows.  Set goal blood pressure for patient at 130/80 or below.  4.  Informed of signs and symptoms of ACS and advised to seek emergent treatment for  any new worsening symptoms.  Patient also advised sooner follow-up as needed.  Also advised to follow-up with family doctor as needed  This note is dictated utilizing voice recognition software.  Although this record has been proof read, transcriptional errors may still be present. If questions occur regarding the content of this record please do not hesitate to call our office.  I have reviewed and confirmed the accuracy of the ROS as documented by the MA/LPN/RN ANNE Parry    Return if symptoms worsen or fail to improve, for cath follow up 1-2 weeks.    Diagnoses and all orders for this visit:    1. HFrEF (heart failure with reduced ejection fraction) (Primary)  -     [facility]; Future  -     CBC & Differential; Future  -     Comprehensive Metabolic Panel; Future  -     TSH; Future  -     Lipid Panel; Future  -     Magnesium; Future    2. Coronary artery disease due to calcified coronary lesion  -     [facility]; Future  -     CBC & Differential; Future  -     Comprehensive Metabolic Panel; Future  -     TSH; Future  -     Lipid Panel; Future  -     Magnesium; Future    3. Precordial pain  -     [facility]; Future  -     CBC & Differential; Future  -     Comprehensive Metabolic Panel; Future  -     TSH; Future  -     Lipid Panel; Future  -     Magnesium; Future    4. Shortness of breath  -     [facility]; Future  -     CBC & Differential; Future  -     Comprehensive Metabolic Panel; Future  -     TSH; Future  -     Lipid Panel; Future  -     Magnesium; Future    5. Essential (primary) hypertension  -     [facility]; Future  -     CBC & Differential; Future  -     Comprehensive Metabolic Panel; Future  -     TSH; Future  -     Lipid Panel; Future  -     Magnesium; Future    6. AICD (automatic cardioverter/defibrillator) present  -     [facility]; Future  -     CBC & Differential; Future  -     Comprehensive Metabolic Panel; Future  -     TSH;  Future  -     Lipid Panel; Future  -     Magnesium; Future    7. Near syncope  -     [facility]; Future  -     CBC & Differential; Future  -     Comprehensive Metabolic Panel; Future  -     TSH; Future  -     Lipid Panel; Future  -     Magnesium; Future    8. Abnormal stress test  -     [facility]; Future  -     CBC & Differential; Future  -     Comprehensive Metabolic Panel; Future  -     TSH; Future  -     Lipid Panel; Future  -     Magnesium; Future        Maeve A Page  reports that she quit smoking about 3 years ago. Her smoking use included cigarettes. She started smoking about 43 years ago. She has a 20 pack-year smoking history. She has never used smokeless tobacco. I have educated her on the risk of diseases from using tobacco products. Patient does not smoke.          BMI is within normal parameters. No other follow-up for BMI required.               MEDS ORDERED DURING VISIT:  No orders of the defined types were placed in this encounter.          This document has been electronically signed by Marshall Oquendo Jr., ANNE  February 17, 2025 15:01 EST

## 2025-03-12 ENCOUNTER — RESULTS FOLLOW-UP (OUTPATIENT)
Dept: CARDIOLOGY | Facility: CLINIC | Age: 65
End: 2025-03-12
Payer: MEDICARE

## 2025-03-12 NOTE — TELEPHONE ENCOUNTER
I called patient and went over her lab results as follows:     Unremarkable CBC     Her CMP elevated creatinine of 1.46 with an EGFR of 40.  Elevated glucose at 222.     Keep follow-up.  Labs forwarded to PCP

## 2025-03-12 NOTE — PROGRESS NOTES
Unremarkable CBC    Her CMP elevated creatinine of 1.46 with an EGFR of 40.  Elevated glucose at 222.    Keep follow-up.  Labs forwarded to PCP

## 2025-03-25 ENCOUNTER — OFFICE VISIT (OUTPATIENT)
Dept: CARDIOLOGY | Facility: CLINIC | Age: 65
End: 2025-03-25
Payer: MEDICARE

## 2025-03-25 VITALS
HEIGHT: 62 IN | DIASTOLIC BLOOD PRESSURE: 78 MMHG | SYSTOLIC BLOOD PRESSURE: 137 MMHG | BODY MASS INDEX: 34.37 KG/M2 | WEIGHT: 186.8 LBS | HEART RATE: 88 BPM | OXYGEN SATURATION: 92 %

## 2025-03-25 DIAGNOSIS — I25.84 CORONARY ARTERY DISEASE DUE TO CALCIFIED CORONARY LESION: ICD-10-CM

## 2025-03-25 DIAGNOSIS — E78.5 DYSLIPIDEMIA: ICD-10-CM

## 2025-03-25 DIAGNOSIS — Z98.890 STATUS POST LEFT HEART CATHETERIZATION: Primary | ICD-10-CM

## 2025-03-25 DIAGNOSIS — I50.20 HFREF (HEART FAILURE WITH REDUCED EJECTION FRACTION): ICD-10-CM

## 2025-03-25 DIAGNOSIS — I25.10 CORONARY ARTERY DISEASE DUE TO CALCIFIED CORONARY LESION: ICD-10-CM

## 2025-03-25 DIAGNOSIS — I10 ESSENTIAL (PRIMARY) HYPERTENSION: ICD-10-CM

## 2025-03-25 DIAGNOSIS — Z95.810 AICD (AUTOMATIC CARDIOVERTER/DEFIBRILLATOR) PRESENT: ICD-10-CM

## 2025-03-25 DIAGNOSIS — I73.9 PVD (PERIPHERAL VASCULAR DISEASE) WITH CLAUDICATION: ICD-10-CM

## 2025-03-25 PROCEDURE — 1159F MED LIST DOCD IN RCRD: CPT | Performed by: NURSE PRACTITIONER

## 2025-03-25 PROCEDURE — 3075F SYST BP GE 130 - 139MM HG: CPT | Performed by: NURSE PRACTITIONER

## 2025-03-25 PROCEDURE — 1160F RVW MEDS BY RX/DR IN RCRD: CPT | Performed by: NURSE PRACTITIONER

## 2025-03-25 PROCEDURE — 3078F DIAST BP <80 MM HG: CPT | Performed by: NURSE PRACTITIONER

## 2025-03-25 PROCEDURE — 99214 OFFICE O/P EST MOD 30 MIN: CPT | Performed by: NURSE PRACTITIONER

## 2025-03-25 RX ORDER — LISINOPRIL 2.5 MG/1
2.5 TABLET ORAL DAILY
Qty: 90 TABLET | Refills: 3 | Status: SHIPPED | OUTPATIENT
Start: 2025-03-25

## 2025-03-25 NOTE — PROGRESS NOTES
Dexter Yates is a 64 y.o. female who presents to day for cath follow up  ([facility] 3/10/25 Dr Multani Chronic total occlusion RCA good collaterals.).    CHIEF COMPLIANT  Chief Complaint   Patient presents with    cath follow up      [facility] 3/10/25 Dr Multani Chronic total occlusion RCA good collaterals.       Active Problems:  Problem List Items Addressed This Visit          Cardiac and Vasculature    Essential (primary) hypertension    Relevant Medications    lisinopril (PRINIVIL,ZESTRIL) 2.5 MG tablet    Other Relevant Orders    Doppler Ankle Brachial Index Single Level CAR    CAD (coronary artery disease)    Relevant Orders    Doppler Ankle Brachial Index Single Level CAR    Dyslipidemia    Relevant Orders    Doppler Ankle Brachial Index Single Level CAR     Other Visit Diagnoses         Status post left heart catheterization    -  Primary    Relevant Orders    Doppler Ankle Brachial Index Single Level CAR      HFrEF (heart failure with reduced ejection fraction)        Relevant Orders    Doppler Ankle Brachial Index Single Level CAR      AICD (automatic cardioverter/defibrillator) present        Relevant Orders    Doppler Ankle Brachial Index Single Level CAR      PVD (peripheral vascular disease) with claudication        Relevant Orders    Doppler Ankle Brachial Index Single Level CAR        Active Problems:  1. Coronary artery disease. Catheterization in 2009 at [facility] demonstrated 30%  circumflex disease with 50% posterolateral branch.   1.1 follow-up catheterization in September 2016 because of non-ST elevation myocardial infarction demonstrated a total occlusion of the right coronary artery with 90% high-grade LAD disease and 50-70% circumflex disease. Stenting of the LAD was performed. Medical management recommended and percutaneous intervention in the circumflex if patient fails medical therapy.  1.2 heart cath 3/17: stenting of the circumflex. Patent stent to the LAD with chronic  total occlusion of the right coronary artery. Medical management recommended  1.3 left heart catheterization 6/20: Left main normal, LAD 30% in-stent stenosis with left-to-right collaterals, circumflex patent stent, left to right collaterals, % occluded, EF 50 to 55%  1.4 Left heart Catheterization 11/24/21: LAD 95% in-stent stenosis with angioplasty that left minimal residual stenosis,  of the RCA that was unable to be crossed, 90% septal , EF less than 30%  1.5 left heart cath 7/23:  successful stenting of  in the proximal third of the LAD related to intra-stent restenosis.  EF 30-35% with global hypokinesis.     1.6 Stress test 2/25:  small to moderately sized, dense, fixed defect in the lateral wall compatible with infarct.  Additionally, there was a small, mild completely reversible a focal defect felt compatible with ischemia. Moderately depressed post stress ejection fraction of 37% with lateral and apical hypokinesis  2. Ischemic cardiomyopathy s/p AICD   2.1 echocardiogram 1/22: EF 25 to 30% dilated left and right atrium, no significant valvular disease  2.2 echocardiogram 4/24: EF 35±5%, grade 1A diastolic dysfunction, trivial MR  3. Hypertension.   4. Chronic tobacco habituation.   5. Dyslipidemia.   6.  Angioplasty to left lower extremity per Dr. White 8/2/17    HPI  HPI  Ms. Maeve Johnson is a 64-year-old female patient who is being seen today for history of coronary artery disease, chronic arterial hypertension, and heart failure with reduced ejection fraction.     Patient does have a history of chronic arterial hypertension in which she is being treated with metoprolol.  Today her blood pressure is 137/78 and heart rate of 80.  Her blood pressure has been maintaining more of a normal pressure.  She continues to have some dizziness with position changes.     Patient does have a history of coronary artery disease in which she has stenting to the LAD with a jailed septal and a   of the RCA that was well collateralized from the left.  She is on a atorvastatin for high intensity statin therapy Plavix and aspirin for antiplatelet therapy.  We did review this in detail.  She denies any complications at the right femoral access site.     Patient does have heart failure with reduced ejection fraction.  Most recently patient's ejection fraction was 35+ to -5% per echocardiogram in April 2024.  She is on Farxiga and metoprolol succinate.  ACE/ARB/Arni has been deferred due to periods of hypotension.    Patient does have chronic shortness of breath is unchanged nonprogressive.  She says even minimal activity causes her shortness of breath.  Says even walking through the house.  She has to use a motorized cart when she goes to the grocery store.  She does have some level of dyspnea at rest as well as orthopnea.    Patient does have a history of PVD with previous angioplasty to the left lower extremity in 2017.  She is starting experience claudication-like symptoms again such as her legs burning like they are on fire when she walks.    Patient denies any chest pain, palpitations, lower extremity edema, syncope, or strokelike symptoms.  PRIOR MEDS  Current Outpatient Medications on File Prior to Visit   Medication Sig Dispense Refill    albuterol sulfate  (90 Base) MCG/ACT inhaler Inhale 2 puffs Every 4 (Four) Hours As Needed for Wheezing.      aspirin 81 MG EC tablet TAKE ONE TABLET BY MOUTH DAILY 30 tablet 11    atorvastatin (LIPITOR) 40 MG tablet Take 1 tablet by mouth every night at bedtime.      bumetanide (BUMEX) 1 MG tablet Take 1 tablet by mouth 2 (Two) Times a Day.      citalopram (CeleXA) 40 MG tablet Take 1 tablet by mouth Daily.  1    clopidogrel (PLAVIX) 75 MG tablet TAKE 1 TABLET BY MOUTH DAILY. 90 tablet 3    docusate sodium (COLACE) 250 MG capsule Take 1 capsule by mouth Daily.      Enulose 10 GM/15ML solution solution (encephalopathy) Take 45 mL by mouth 2 (Two) Times a Day.       Farxiga 10 MG tablet TAKE 1 TABLET BY MOUTH DAILY 30 tablet 11    fenofibrate (TRICOR) 145 MG tablet Take 1 tablet by mouth Daily.  5    ferrous sulfate 325 (65 FE) MG tablet Take 1 tablet by mouth Daily With Breakfast.      fexofenadine (ALLEGRA) 180 MG tablet Take 1 tablet by mouth Daily.      Fluticasone Furoate-Vilanterol (Breo Ellipta) 100-25 MCG/INH inhaler Inhale 1 puff Daily.      gabapentin (NEURONTIN) 800 MG tablet Take 1 tablet by mouth 3 (Three) Times a Day.      Insulin Glargine (BASAGLAR KWIKPEN SC) Inject 60 Units under the skin into the appropriate area as directed Every Night.      metoprolol succinate XL (TOPROL-XL) 25 MG 24 hr tablet Take 0.5 tablets by mouth Daily. 45 tablet 3    nitroglycerin (Nitrostat) 0.4 MG SL tablet Place 1 tablet under the tongue Every 5 (Five) Minutes As Needed for Chest Pain. 25 tablet 3    NovoLOG FlexPen 100 UNIT/ML solution pen-injector sc pen 15 Units 3 (Three) Times a Day With Meals.      pantoprazole (PROTONIX) 40 MG EC tablet Take 1 tablet by mouth Every Morning Before Breakfast.      potassium chloride (KLOR-CON M20) 20 MEQ CR tablet Take 1 tablet by mouth 2 (Two) Times a Day.      Semaglutide,0.25 or 0.5MG/DOS, (Ozempic, 0.25 or 0.5 MG/DOSE,) 2 MG/3ML solution pen-injector Inject 0.25 mg under the skin into the appropriate area as directed 1 (One) Time Per Week.      Umeclidinium Bromide (Incruse Ellipta) 62.5 MCG/ACT aerosol powder  Inhale.       No current facility-administered medications on file prior to visit.       ALLERGIES  Drug [acetaminophen-codeine], Hydrocodone, and Midodrine    HISTORY  Past Medical History:   Diagnosis Date    Asthma     CAD (coronary artery disease)     Catheterization in 2009 at [facility] demonstrated 30% circumflex disease with 50% posterolateral branch.     Chest pain     CHF (congestive heart failure)     Clotting disorder     Takes blood thinners    Congenital heart disease     Hearts at 30%    COPD (chronic  obstructive pulmonary disease)     Depression     Diabetes mellitus     checks bs at home daily usually; cannot always follow a diabetic diet she states due to cost of food    Dyslipidemia     Fatigue     Hiatal hernia     Hyperlipidemia     Hypertension     Myocardial infarction     No natural teeth     Peripheral vascular disease     Shortness of breath     Tobacco use     smokes 2 packs a week    Wears glasses        Social History     Socioeconomic History    Marital status:    Tobacco Use    Smoking status: Former     Current packs/day: 0.00     Average packs/day: 0.5 packs/day for 40.0 years (20.0 ttl pk-yrs)     Types: Cigarettes     Start date: 8/4/1981     Quit date: 8/4/2021     Years since quitting: 3.6    Smokeless tobacco: Never    Tobacco comments:         Vaping Use    Vaping status: Never Used   Substance and Sexual Activity    Alcohol use: No    Drug use: No    Sexual activity: Not Currently     Partners: Male     Birth control/protection: Tubal ligation       Family History   Problem Relation Age of Onset    Hypertension Mother     Hyperlipidemia Mother     Skin cancer Mother     No Known Problems Father        Review of Systems   Constitutional:  Negative for chills, fatigue and fever.   HENT:  Negative for congestion, rhinorrhea and sore throat.    Eyes:  Negative for visual disturbance.   Respiratory:  Positive for shortness of breath (more with activity). Negative for apnea and chest tightness.    Cardiovascular:  Negative for chest pain and leg swelling.   Gastrointestinal:  Positive for constipation. Negative for diarrhea and nausea.   Musculoskeletal:  Positive for arthralgias. Negative for back pain and neck pain.   Allergic/Immunologic: Positive for environmental allergies. Negative for food allergies.   Neurological:  Positive for dizziness (getting up and down bending over) and light-headedness. Negative for syncope and weakness.   Hematological:  Bruises/bleeds easily.  "  Psychiatric/Behavioral:  Negative for sleep disturbance (NO SOB does not sleep well).        Objective     VITALS: /78 (BP Location: Right arm, Patient Position: Sitting, Cuff Size: Adult)   Pulse 88   Ht 157 cm (61.81\")   Wt 84.7 kg (186 lb 12.8 oz)   SpO2 92%   BMI 34.38 kg/m²     LABS:   Lab Results (most recent)       None            IMAGING:   No Images in the past 120 days found..    EXAM:  Physical Exam  Vitals and nursing note reviewed.   Constitutional:       Appearance: She is well-developed.   HENT:      Head: Normocephalic.   Neck:      Thyroid: No thyroid mass.      Vascular: No carotid bruit or JVD.      Trachea: Trachea and phonation normal.   Cardiovascular:      Rate and Rhythm: Normal rate and regular rhythm.      Pulses:           Radial pulses are 2+ on the right side and 2+ on the left side.        Dorsalis pedis pulses are 2+ on the right side and 1+ on the left side.      Heart sounds: Normal heart sounds. Murmur heard.      No friction rub. No gallop.   Pulmonary:      Effort: Pulmonary effort is normal. No respiratory distress.      Breath sounds: Normal breath sounds. No wheezing or rales.   Musculoskeletal:         General: No swelling. Normal range of motion.      Cervical back: Neck supple.   Skin:     General: Skin is warm and dry.      Capillary Refill: Capillary refill takes less than 2 seconds.      Findings: No rash.   Neurological:      Mental Status: She is alert and oriented to person, place, and time.   Psychiatric:         Speech: Speech normal.         Behavior: Behavior normal.         Thought Content: Thought content normal.         Judgment: Judgment normal.         Procedure   Procedures       Assessment & Plan    Diagnosis Plan   1. Status post left heart catheterization  Doppler Ankle Brachial Index Single Level CAR      2. HFrEF (heart failure with reduced ejection fraction)  Doppler Ankle Brachial Index Single Level CAR      3. Coronary artery disease due " to calcified coronary lesion  Doppler Ankle Brachial Index Single Level CAR      4. Dyslipidemia  Doppler Ankle Brachial Index Single Level CAR      5. AICD (automatic cardioverter/defibrillator) present  Doppler Ankle Brachial Index Single Level CAR      6. Essential (primary) hypertension  Doppler Ankle Brachial Index Single Level CAR      7. PVD (peripheral vascular disease) with claudication  Doppler Ankle Brachial Index Single Level CAR      1.  Patient did have a left heart catheterization via the right femoral access site.  She denies any complications of the right femoral access site.  Pulses are palpable distal to the insertion site.  2.  Patient's blood pressure is controlled on current blood pressure medication regimen.   Patient advised to monitor blood pressure on a daily basis and report any persistent highs or lows.  Set goal blood pressure for patient at 130/80 or below  3.  Given the patient's heart failure with reduced ejection fraction and that her blood pressure is maintaining normal.  We would like to start her back on lisinopril 2.5 mg daily.  She will monitor blood pressure closely as discussed above.  4.  Due to patient's claudication-like symptoms as well as history of PVD with angioplasty of the left lower extremity as well as decreased pulse in the left left dorsalis pedis I would like for her to have ankle-brachial indices for further evaluation of her peripheral vascular disease.  5.  Informed of signs and symptoms of ACS and advised to seek emergent treatment for any new worsening symptoms.  Patient also advised sooner follow-up as needed.  Also advised to follow-up with family doctor as needed  This note is dictated utilizing voice recognition software.  Although this record has been proof read, transcriptional errors may still be present. If questions occur regarding the content of this record please do not hesitate to call our office.  I have reviewed and confirmed the accuracy of  the ROS as documented by the MA/LPN/RN ANNE Parry    Return in about 6 months (around 9/25/2025), or if symptoms worsen or fail to improve.    Diagnoses and all orders for this visit:    1. Status post left heart catheterization (Primary)  -     Doppler Ankle Brachial Index Single Level CAR; Future    2. HFrEF (heart failure with reduced ejection fraction)  -     Doppler Ankle Brachial Index Single Level CAR; Future    3. Coronary artery disease due to calcified coronary lesion  -     Doppler Ankle Brachial Index Single Level CAR; Future    4. Dyslipidemia  -     Doppler Ankle Brachial Index Single Level CAR; Future    5. AICD (automatic cardioverter/defibrillator) present  -     Doppler Ankle Brachial Index Single Level CAR; Future    6. Essential (primary) hypertension  -     Doppler Ankle Brachial Index Single Level CAR; Future    7. PVD (peripheral vascular disease) with claudication  -     Doppler Ankle Brachial Index Single Level CAR; Future    Other orders  -     lisinopril (PRINIVIL,ZESTRIL) 2.5 MG tablet; Take 1 tablet by mouth Daily.  Dispense: 90 tablet; Refill: 3        Maeve CHRISTEL Page  reports that she quit smoking about 3 years ago. Her smoking use included cigarettes. She started smoking about 43 years ago. She has a 20 pack-year smoking history. She has never used smokeless tobacco. I have educated her on the risk of diseases from using tobacco products. Patient does not smoke.                          MEDS ORDERED DURING VISIT:  New Medications Ordered This Visit   Medications    lisinopril (PRINIVIL,ZESTRIL) 2.5 MG tablet     Sig: Take 1 tablet by mouth Daily.     Dispense:  90 tablet     Refill:  3           This document has been electronically signed by ANNE Parry Jr.  March 25, 2025 13:59 EDT

## 2025-04-02 ENCOUNTER — TELEPHONE (OUTPATIENT)
Dept: CARDIOLOGY | Facility: CLINIC | Age: 65
End: 2025-04-02
Payer: MEDICARE

## 2025-04-02 NOTE — TELEPHONE ENCOUNTER
Caller: Amy Johnson    Relationship: Emergency Contact    Best call back number: [phone]    What is the best time to reach you: ANY    Who are you requesting to speak with (clinical staff, provider,  specific staff member): CLINICAL       What was the call regarding: PATIENT WANTS TO DISCUSS POSSIBLE SIDE EFFECT OF HER MEDICATION, WITH SWELLING OF THE FEET.    Is it okay if the provider responds through MyChart: YES

## 2025-04-02 NOTE — TELEPHONE ENCOUNTER
Patient daughter Amy called to see if her medication change could cause swelling.  Patient feet and ankles are swelling yesterday and today. She is able to get her shoes on but they are tight . She was started back on Lisinopril 2.5 mg qd.Patient got really dizzy and light headed had to sit down for a bit. She did not check her blood pressure after this event .

## 2025-04-03 NOTE — TELEPHONE ENCOUNTER
I called and spoke with Amy and advised of JR recommendations as follows:    Metoprolol can cause some swelling. However she has been on this for quite a while. Due to the recurrent hypotension with restarting the lisinopril. I would have her discontinue this. Monitor blood pressure report any significant highs or lows

## 2025-04-09 ENCOUNTER — RESULTS FOLLOW-UP (OUTPATIENT)
Dept: CARDIOLOGY | Facility: CLINIC | Age: 65
End: 2025-04-09
Payer: MEDICARE

## 2025-05-08 ENCOUNTER — TELEPHONE (OUTPATIENT)
Dept: CARDIOLOGY | Facility: CLINIC | Age: 65
End: 2025-05-08
Payer: MEDICARE

## 2025-05-08 NOTE — TELEPHONE ENCOUNTER
Missed remote transmissions. Called patient and spoke to daughter. She is going to reboot the monitor.

## 2025-05-27 RX ORDER — METOPROLOL SUCCINATE 25 MG/1
12.5 TABLET, EXTENDED RELEASE ORAL DAILY
Qty: 45 TABLET | Refills: 4 | Status: SHIPPED | OUTPATIENT
Start: 2025-05-27

## 2025-06-03 ENCOUNTER — TELEPHONE (OUTPATIENT)
Dept: CARDIOLOGY | Facility: CLINIC | Age: 65
End: 2025-06-03
Payer: MEDICARE

## 2025-06-03 NOTE — TELEPHONE ENCOUNTER
Yes I would have her discontinue the lisinopril when the GFR gets below 30 it can be detrimental instead of protective to the kidneys

## 2025-06-03 NOTE — TELEPHONE ENCOUNTER
Caller: Amy Johnson    Relationship: Emergency Contact    Best call back number: [phone]    What is the best time to reach you: ANY    Who are you requesting to speak with (clinical staff, provider,  specific staff member): CLINICAL     What was the call regarding:AMY HAD QUESTIONS ABOUT LISINOPRIL MEDICATION PT IS TAKING. PT RECENTLY HAD LABS FROM PCP THAT SAID HER KIDNEY FUNCTION DECREASED AND SHE THOUGHT IT MAY HAVE SOMETHING TO DO WITH THE LISINOPRIL. AMY WILL HAVE PCP FAX LABS TO OUR OFFICE. HUB GAVE HER OFFICE FAX NUMBER.

## 2025-07-21 ENCOUNTER — TELEPHONE (OUTPATIENT)
Dept: CARDIOLOGY | Facility: CLINIC | Age: 65
End: 2025-07-21
Payer: MEDICARE

## 2025-07-21 NOTE — TELEPHONE ENCOUNTER
Thursday she passed out and hit her head . She was in patient @ [facility] over night . Today she passed out @ 10:30 this morning . Her potassium was low last week .  She has a blood pressure monitor. I did ask her to check blood pressure when she feels bad. Patient is seeing MD Jordan Boo.

## 2025-07-21 NOTE — TELEPHONE ENCOUNTER
Caller: Amy Johnson    Relationship to patient: Emergency Contact    Best call back number: [phone]    Chief complaint: PATIENT HAS BEEN PASSING OUT PLEASE ADVISE, AND WANTS TO BE SEEN SOONER    Type of visit: FOLLOW-UP    Requested date: ASAP

## 2025-07-21 NOTE — TELEPHONE ENCOUNTER
She will need an expedited appointment in the next few days and ER for any recurrent syncope or worsening symptoms

## 2025-07-30 ENCOUNTER — TELEPHONE (OUTPATIENT)
Dept: CARDIOLOGY | Facility: CLINIC | Age: 65
End: 2025-07-30

## 2025-07-30 NOTE — TELEPHONE ENCOUNTER
Caller: Amy Johnson    Relationship: Emergency Contact    Best call back number: [phone]     What is the best time to reach you: ANY     What was the call regarding: PT HAD LAB WORK DONE ON 7/29 @ [facility], PT HAD TO F/U WITH PCP. WAS TOLD EVERYTHING LOOKS GOOD AND LABS WERE BACK TO NORMAL. ASKING THAT WE PULL LABS AND WANTED TO MAKE US AWARE SHE IS CAN APPT 7/30 AND KEEPING APPT 8/27.    Is it okay if the provider responds through MyChart: EITHER

## 2025-07-31 RX ORDER — NITROGLYCERIN 0.4 MG/1
0.4 TABLET SUBLINGUAL SEE ADMIN INSTRUCTIONS
Qty: 25 TABLET | Refills: 4 | Status: SHIPPED | OUTPATIENT
Start: 2025-07-31

## 2025-08-07 LAB
MC_CV_MDC_IDC_RATE_1: 182
MC_CV_MDC_IDC_RATE_1: 222
MC_CV_MDC_IDC_SHOCK_MEASURED_IMPEDANCE: 83
MC_CV_MDC_IDC_THERAPIES: NORMAL
MC_CV_MDC_IDC_THERAPIES: NORMAL
MC_CV_MDC_IDC_ZONE_ID: 1
MC_CV_MDC_IDC_ZONE_ID: 2
MC_CV_MDC_IDC_ZONE_ID: 3
MDC_IDC_MSMT_BATTERY_REMAINING_LONGEVITY: 66 MO
MDC_IDC_MSMT_BATTERY_REMAINING_PERCENTAGE: 55 %
MDC_IDC_MSMT_BATTERY_RRT_TRIGGER: 2.62
MDC_IDC_MSMT_BATTERY_STATUS: NORMAL
MDC_IDC_MSMT_BATTERY_VOLTAGE: 2.96
MDC_IDC_MSMT_CAP_CHARGE_TIME: 8.9
MDC_IDC_MSMT_LEADCHNL_RV_IMPEDANCE_VALUE: 500
MDC_IDC_MSMT_LEADCHNL_RV_PACING_THRESHOLD_POLARITY: NORMAL
MDC_IDC_MSMT_LEADCHNL_RV_SENSING_INTR_AMPL: 12
MDC_IDC_PG_IMPLANT_DTM: NORMAL
MDC_IDC_PG_MFG: NORMAL
MDC_IDC_PG_MODEL: NORMAL
MDC_IDC_PG_SERIAL: NORMAL
MDC_IDC_PG_TYPE: NORMAL
MDC_IDC_SESS_DTM: NORMAL
MDC_IDC_SESS_TYPE: NORMAL
MDC_IDC_SET_BRADY_LOWRATE: 40
MDC_IDC_SET_BRADY_MAX_SENSOR_RATE: 110
MDC_IDC_SET_BRADY_MODE: NORMAL
MDC_IDC_SET_LEADCHNL_RV_PACING_AMPLITUDE: 2
MDC_IDC_SET_LEADCHNL_RV_PACING_POLARITY: NORMAL
MDC_IDC_SET_LEADCHNL_RV_PACING_PULSEWIDTH: 0.5
MDC_IDC_SET_LEADCHNL_RV_SENSING_POLARITY: NORMAL
MDC_IDC_SET_LEADCHNL_RV_SENSING_SENSITIVITY: 0.5
MDC_IDC_SET_ZONE_STATUS: NORMAL
MDC_IDC_SET_ZONE_TYPE: NORMAL
MDC_IDC_STAT_BRADY_RV_PERCENT_PACED: 1
MDC_IDC_STAT_TACHYTHERAPY_ATP_DELIVERED_RECENT: 0
MDC_IDC_STAT_TACHYTHERAPY_SHOCKS_ABORTED_RECENT: 0
MDC_IDC_STAT_TACHYTHERAPY_SHOCKS_DELIVERED_RECENT: 0

## 2025-08-27 ENCOUNTER — OFFICE VISIT (OUTPATIENT)
Dept: CARDIOLOGY | Facility: CLINIC | Age: 65
End: 2025-08-27
Payer: MEDICARE

## 2025-08-27 VITALS
OXYGEN SATURATION: 90 % | BODY MASS INDEX: 34.23 KG/M2 | WEIGHT: 186 LBS | HEART RATE: 82 BPM | SYSTOLIC BLOOD PRESSURE: 111 MMHG | HEIGHT: 62 IN | DIASTOLIC BLOOD PRESSURE: 64 MMHG

## 2025-08-27 DIAGNOSIS — E87.6 HYPOKALEMIA: ICD-10-CM

## 2025-08-27 DIAGNOSIS — I50.20 HFREF (HEART FAILURE WITH REDUCED EJECTION FRACTION): Primary | ICD-10-CM

## 2025-08-27 DIAGNOSIS — R07.2 PRECORDIAL PAIN: ICD-10-CM

## 2025-08-27 DIAGNOSIS — Z95.810 AICD (AUTOMATIC CARDIOVERTER/DEFIBRILLATOR) PRESENT: ICD-10-CM

## 2025-08-27 DIAGNOSIS — I25.5 ISCHEMIC CARDIOMYOPATHY: ICD-10-CM

## 2025-08-27 DIAGNOSIS — I25.10 CORONARY ARTERY DISEASE DUE TO CALCIFIED CORONARY LESION: ICD-10-CM

## 2025-08-27 DIAGNOSIS — I25.84 CORONARY ARTERY DISEASE DUE TO CALCIFIED CORONARY LESION: ICD-10-CM

## 2025-08-27 DIAGNOSIS — R06.02 SHORTNESS OF BREATH: ICD-10-CM

## 2025-08-27 DIAGNOSIS — I10 ESSENTIAL (PRIMARY) HYPERTENSION: ICD-10-CM

## 2025-08-27 RX ORDER — CELECOXIB 200 MG/1
200 CAPSULE ORAL DAILY
COMMUNITY

## 2025-08-27 RX ORDER — SEMAGLUTIDE 1.34 MG/ML
1 INJECTION, SOLUTION SUBCUTANEOUS WEEKLY
COMMUNITY

## (undated) DEVICE — CANN NASL CO2 DIVIDED A/

## (undated) DEVICE — PK ANGIO OR 10

## (undated) DEVICE — NDL PERC 1PRT THNWALL W/BASEPLT 18G 7CM

## (undated) DEVICE — CATH GUIDE SOFTVU FLUSH HT PIG .035 4F 65CM

## (undated) DEVICE — CATH GUIDE SOFTVU SELECT/V UT BR HK2 .035 5F 65CM

## (undated) DEVICE — GW MAGICTORQUE .035 260CM

## (undated) DEVICE — SI AVANTI+ 6F STD W/GW  NO OBT: Brand: AVANTI

## (undated) DEVICE — INFLATION DEVICE: Brand: ENCORE 26

## (undated) DEVICE — GW RDRUN XCHG FRM STD STFF .035 65X260

## (undated) DEVICE — BALN EVERCROSS OTW .035 5F 6X40 135

## (undated) DEVICE — AIRWY 90MM NO9

## (undated) DEVICE — DRSNG SURESITE WNDW 4X4.5

## (undated) DEVICE — AVANTI + 4F STD W/GW: Brand: AVANTI

## (undated) DEVICE — CVR HNDL LT SURG ACCSSRY BLU STRL

## (undated) DEVICE — TBG INJ CONTRL EXCITE RA 1200PSI 48IN

## (undated) DEVICE — DRSNG SURESITE WNDW 2.38X2.75

## (undated) DEVICE — GLIDEX™ COATED HYDROPHILIC GUIDEWIRE: Brand: MAGIC TORQUE™

## (undated) DEVICE — INTROFLXOR CHECKFLO .087 ANL1 6F45